# Patient Record
Sex: MALE | Race: BLACK OR AFRICAN AMERICAN | Employment: OTHER | URBAN - METROPOLITAN AREA
[De-identification: names, ages, dates, MRNs, and addresses within clinical notes are randomized per-mention and may not be internally consistent; named-entity substitution may affect disease eponyms.]

---

## 2017-02-17 ENCOUNTER — HOSPITAL ENCOUNTER (OUTPATIENT)
Dept: PREADMISSION TESTING | Age: 72
Discharge: HOME | End: 2017-02-17
Attending: ORTHOPAEDIC SURGERY | Admitting: ORTHOPAEDIC SURGERY

## 2017-02-17 VITALS
SYSTOLIC BLOOD PRESSURE: 135 MMHG | DIASTOLIC BLOOD PRESSURE: 76 MMHG | HEIGHT: 70 IN | WEIGHT: 272 LBS | BODY MASS INDEX: 38.94 KG/M2 | HEART RATE: 112 BPM | RESPIRATION RATE: 18 BRPM | OXYGEN SATURATION: 96 % | TEMPERATURE: 100.4 F

## 2017-02-17 LAB
ABO/RH: NORMAL
ANION GAP SERPL CALCULATED.3IONS-SCNC: 16 MMOL/L (ref 3–16)
ANISOCYTOSIS: ABNORMAL
ANTIBODY SCREEN: NORMAL
APTT: 31.7 SEC (ref 21–31.8)
BACTERIA: ABNORMAL /HPF
BASOPHILS ABSOLUTE: 0 K/UL (ref 0–0.2)
BASOPHILS RELATIVE PERCENT: 0 %
BILIRUBIN URINE: NEGATIVE
BLOOD, URINE: ABNORMAL
BUN BLDV-MCNC: 14 MG/DL (ref 7–20)
CALCIUM SERPL-MCNC: 9.2 MG/DL (ref 8.3–10.6)
CASTS: ABNORMAL /LPF
CHLORIDE BLD-SCNC: 98 MMOL/L (ref 99–110)
CLARITY: CLEAR
CO2: 19 MMOL/L (ref 21–32)
COLOR: YELLOW
CREAT SERPL-MCNC: 1.1 MG/DL (ref 0.8–1.3)
EKG ATRIAL RATE: 105 BPM
EKG DIAGNOSIS: NORMAL
EKG P AXIS: 39 DEGREES
EKG P-R INTERVAL: 146 MS
EKG Q-T INTERVAL: 334 MS
EKG QRS DURATION: 76 MS
EKG QTC CALCULATION (BAZETT): 441 MS
EKG R AXIS: -3 DEGREES
EKG T AXIS: 38 DEGREES
EKG VENTRICULAR RATE: 105 BPM
EOSINOPHILS ABSOLUTE: 0 K/UL (ref 0–0.6)
EOSINOPHILS RELATIVE PERCENT: 0 %
EPITHELIAL CELLS, UA: ABNORMAL /HPF
GFR AFRICAN AMERICAN: >60
GFR NON-AFRICAN AMERICAN: >60
GLUCOSE BLD-MCNC: 429 MG/DL (ref 70–99)
GLUCOSE URINE: >=1000 MG/DL
HCT VFR BLD CALC: 37.5 % (ref 40.5–52.5)
HEMOGLOBIN: 11.2 G/DL (ref 13.5–17.5)
INR BLD: 1.37 (ref 0.85–1.15)
KETONES, URINE: NEGATIVE MG/DL
LEUKOCYTE ESTERASE, URINE: NEGATIVE
LYMPHOCYTES ABSOLUTE: 29.2 K/UL (ref 1–5.1)
LYMPHOCYTES RELATIVE PERCENT: 84 %
MCH RBC QN AUTO: 20.8 PG (ref 26–34)
MCHC RBC AUTO-ENTMCNC: 29.8 G/DL (ref 31–36)
MCV RBC AUTO: 69.7 FL (ref 80–100)
MICROCYTES: ABNORMAL
MICROSCOPIC EXAMINATION: YES
MONOCYTES ABSOLUTE: 0.7 K/UL (ref 0–1.3)
MONOCYTES RELATIVE PERCENT: 2 %
MUCUS: ABNORMAL /LPF
NEUTROPHILS ABSOLUTE: 4.9 K/UL (ref 1.7–7.7)
NEUTROPHILS RELATIVE PERCENT: 14 %
NITRITE, URINE: NEGATIVE
OVALOCYTES: ABNORMAL
PDW BLD-RTO: 21.1 % (ref 12.4–15.4)
PH UA: 6
PLATELET # BLD: 149 K/UL (ref 135–450)
PMV BLD AUTO: 7.9 FL (ref 5–10.5)
POTASSIUM SERPL-SCNC: 4.4 MMOL/L (ref 3.5–5.1)
PROTEIN UA: 30 MG/DL
PROTHROMBIN TIME: 15.5 SEC (ref 9.6–13)
RBC # BLD: 5.39 M/UL (ref 4.2–5.9)
RBC UA: ABNORMAL /HPF (ref 0–2)
SCHISTOCYTES: ABNORMAL
SODIUM BLD-SCNC: 133 MMOL/L (ref 136–145)
SPECIFIC GRAVITY UA: 1.02
TEAR DROP CELLS: ABNORMAL
URINE TYPE: ABNORMAL
UROBILINOGEN, URINE: 0.2 E.U./DL
WBC # BLD: 34.8 K/UL (ref 4–11)
WBC UA: ABNORMAL /HPF (ref 0–5)

## 2017-02-17 PROCEDURE — 93010 ELECTROCARDIOGRAM REPORT: CPT | Performed by: INTERNAL MEDICINE

## 2017-02-17 RX ORDER — HYDROCODONE BITARTRATE AND ACETAMINOPHEN 5; 325 MG/1; MG/1
1 TABLET ORAL EVERY 6 HOURS PRN
Status: ON HOLD | COMMUNITY
End: 2017-08-03 | Stop reason: HOSPADM

## 2017-02-18 LAB
MRSA SCREEN RT-PCR: NORMAL
URINE CULTURE, ROUTINE: NORMAL

## 2017-03-10 ENCOUNTER — HOSPITAL ENCOUNTER (OUTPATIENT)
Dept: SURGERY | Age: 72
Discharge: HOME | End: 2017-03-29
Attending: ORTHOPAEDIC SURGERY | Admitting: ORTHOPAEDIC SURGERY

## 2017-03-10 DIAGNOSIS — M17.12 PRIMARY OSTEOARTHRITIS OF LEFT KNEE: ICD-10-CM

## 2017-07-19 ENCOUNTER — HOSPITAL ENCOUNTER (OUTPATIENT)
Dept: PREADMISSION TESTING | Age: 72
Discharge: HOME | End: 2017-07-19
Attending: ORTHOPAEDIC SURGERY | Admitting: ORTHOPAEDIC SURGERY

## 2017-07-19 VITALS
BODY MASS INDEX: 38.08 KG/M2 | SYSTOLIC BLOOD PRESSURE: 140 MMHG | HEART RATE: 91 BPM | RESPIRATION RATE: 14 BRPM | TEMPERATURE: 98.7 F | WEIGHT: 266 LBS | DIASTOLIC BLOOD PRESSURE: 84 MMHG | HEIGHT: 70 IN | OXYGEN SATURATION: 98 %

## 2017-07-19 LAB
ABO/RH: NORMAL
ANION GAP SERPL CALCULATED.3IONS-SCNC: 13 MMOL/L (ref 3–16)
ANISOCYTOSIS: ABNORMAL
ANTIBODY SCREEN: NORMAL
APTT: 36.2 SEC (ref 24.1–34.9)
ATYPICAL LYMPHOCYTE RELATIVE PERCENT: 63 % (ref 0–6)
BACTERIA: ABNORMAL /HPF
BASOPHILS ABSOLUTE: 0 K/UL (ref 0–0.2)
BASOPHILS RELATIVE PERCENT: 0 %
BILIRUBIN URINE: NEGATIVE
BLOOD, URINE: ABNORMAL
BUN BLDV-MCNC: 15 MG/DL (ref 7–20)
BURR CELLS: ABNORMAL
CALCIUM SERPL-MCNC: 9.2 MG/DL (ref 8.3–10.6)
CASTS: ABNORMAL /LPF
CHLORIDE BLD-SCNC: 103 MMOL/L (ref 99–110)
CLARITY: CLEAR
CO2: 24 MMOL/L (ref 21–32)
COLOR: YELLOW
CREAT SERPL-MCNC: 1.3 MG/DL (ref 0.8–1.3)
EOSINOPHILS ABSOLUTE: 0 K/UL (ref 0–0.6)
EOSINOPHILS RELATIVE PERCENT: 0 %
EPITHELIAL CELLS, UA: ABNORMAL /HPF
GFR AFRICAN AMERICAN: >60
GFR NON-AFRICAN AMERICAN: 54
GLUCOSE BLD-MCNC: 111 MG/DL (ref 70–99)
GLUCOSE URINE: NEGATIVE MG/DL
HCT VFR BLD CALC: 34.4 % (ref 40.5–52.5)
HEMATOLOGY PATH CONSULT: YES
HEMOGLOBIN: 10.8 G/DL (ref 13.5–17.5)
INR BLD: 1.35 (ref 0.85–1.15)
KETONES, URINE: NEGATIVE MG/DL
LEUKOCYTE ESTERASE, URINE: NEGATIVE
LYMPHOCYTES ABSOLUTE: 24.3 K/UL (ref 1–5.1)
LYMPHOCYTES RELATIVE PERCENT: 17 %
MCH RBC QN AUTO: 21.3 PG (ref 26–34)
MCHC RBC AUTO-ENTMCNC: 31.4 G/DL (ref 31–36)
MCV RBC AUTO: 67.8 FL (ref 80–100)
MICROCYTES: ABNORMAL
MICROSCOPIC EXAMINATION: YES
MONOCYTES ABSOLUTE: 0.9 K/UL (ref 0–1.3)
MONOCYTES RELATIVE PERCENT: 3 %
MUCUS: ABNORMAL /LPF
NEUTROPHILS ABSOLUTE: 5.2 K/UL (ref 1.7–7.7)
NEUTROPHILS RELATIVE PERCENT: 17 %
NITRITE, URINE: NEGATIVE
OVALOCYTES: ABNORMAL
PDW BLD-RTO: 22.3 % (ref 12.4–15.4)
PH UA: 5.5
PLATELET # BLD: 130 K/UL (ref 135–450)
PMV BLD AUTO: 7.8 FL (ref 5–10.5)
POLYCHROMASIA: ABNORMAL
POTASSIUM SERPL-SCNC: 4.2 MMOL/L (ref 3.5–5.1)
PROTEIN UA: 30 MG/DL
PROTHROMBIN TIME: 15.2 SEC (ref 9.6–13)
RBC # BLD: 5.08 M/UL (ref 4.2–5.9)
RBC UA: ABNORMAL /HPF (ref 0–2)
SCHISTOCYTES: ABNORMAL
SODIUM BLD-SCNC: 140 MMOL/L (ref 136–145)
SPECIFIC GRAVITY UA: >=1.03
TEAR DROP CELLS: ABNORMAL
URINE TYPE: ABNORMAL
UROBILINOGEN, URINE: 1 E.U./DL
WBC # BLD: 30.4 K/UL (ref 4–11)
WBC UA: ABNORMAL /HPF (ref 0–5)

## 2017-07-19 RX ORDER — CHLORHEXIDINE GLUCONATE 0.12 MG/ML
15 RINSE ORAL 2 TIMES DAILY
Status: CANCELLED | OUTPATIENT
Start: 2017-08-01

## 2017-07-19 RX ORDER — GLIPIZIDE 2.5 MG/1
2.5 TABLET, EXTENDED RELEASE ORAL DAILY
COMMUNITY
End: 2018-12-24 | Stop reason: ALTCHOICE

## 2017-07-19 ASSESSMENT — PAIN DESCRIPTION - ORIENTATION: ORIENTATION: RIGHT;LEFT

## 2017-07-19 ASSESSMENT — PAIN SCALES - GENERAL: PAINLEVEL_OUTOF10: 3

## 2017-07-19 ASSESSMENT — PAIN DESCRIPTION - LOCATION: LOCATION: KNEE

## 2017-07-19 ASSESSMENT — PAIN DESCRIPTION - FREQUENCY: FREQUENCY: CONTINUOUS

## 2017-07-19 ASSESSMENT — PAIN DESCRIPTION - DESCRIPTORS: DESCRIPTORS: ACHING;CONSTANT

## 2017-07-19 ASSESSMENT — PAIN DESCRIPTION - PAIN TYPE: TYPE: CHRONIC PAIN

## 2017-07-20 LAB
HEMATOLOGY PATH CONSULT: NORMAL
MRSA SCREEN RT-PCR: NORMAL
URINE CULTURE, ROUTINE: NORMAL

## 2017-08-03 PROBLEM — M17.12 PRIMARY OSTEOARTHRITIS OF LEFT KNEE: Status: ACTIVE | Noted: 2017-08-03

## 2018-12-24 ENCOUNTER — HOSPITAL ENCOUNTER (INPATIENT)
Age: 73
LOS: 2 days | Discharge: HOME | DRG: 247 | End: 2018-12-27
Attending: EMERGENCY MEDICINE | Admitting: INTERNAL MEDICINE
Payer: MEDICARE

## 2018-12-24 ENCOUNTER — APPOINTMENT (OUTPATIENT)
Dept: CT IMAGING | Age: 73
DRG: 247 | End: 2018-12-24
Payer: MEDICARE

## 2018-12-24 DIAGNOSIS — I21.3 ST ELEVATION MYOCARDIAL INFARCTION (STEMI), UNSPECIFIED ARTERY (HCC): Primary | ICD-10-CM

## 2018-12-24 PROCEDURE — 71275 CT ANGIOGRAPHY CHEST: CPT

## 2018-12-24 PROCEDURE — 74174 CTA ABD&PLVS W/CONTRAST: CPT

## 2018-12-24 PROCEDURE — 93005 ELECTROCARDIOGRAM TRACING: CPT | Performed by: EMERGENCY MEDICINE

## 2018-12-24 PROCEDURE — 6370000000 HC RX 637 (ALT 250 FOR IP): Performed by: EMERGENCY MEDICINE

## 2018-12-24 PROCEDURE — 99285 EMERGENCY DEPT VISIT HI MDM: CPT

## 2018-12-24 PROCEDURE — 6360000004 HC RX CONTRAST MEDICATION: Performed by: EMERGENCY MEDICINE

## 2018-12-24 RX ORDER — HEPARIN SODIUM 5000 [USP'U]/ML
5000 INJECTION, SOLUTION INTRAVENOUS; SUBCUTANEOUS ONCE
Status: DISCONTINUED | OUTPATIENT
Start: 2018-12-25 | End: 2018-12-24

## 2018-12-24 RX ORDER — HEPARIN SODIUM 5000 [USP'U]/ML
5000 INJECTION, SOLUTION INTRAVENOUS; SUBCUTANEOUS ONCE
Status: COMPLETED | OUTPATIENT
Start: 2018-12-25 | End: 2018-12-25

## 2018-12-24 RX ORDER — NITROGLYCERIN 0.4 MG/1
0.4 TABLET SUBLINGUAL EVERY 5 MIN PRN
Status: DISCONTINUED | OUTPATIENT
Start: 2018-12-24 | End: 2018-12-27 | Stop reason: HOSPADM

## 2018-12-24 RX ORDER — ASPIRIN 81 MG/1
324 TABLET, CHEWABLE ORAL ONCE
Status: COMPLETED | OUTPATIENT
Start: 2018-12-24 | End: 2018-12-24

## 2018-12-24 RX ORDER — NITROGLYCERIN 0.4 MG/1
TABLET SUBLINGUAL
Status: DISPENSED
Start: 2018-12-24 | End: 2018-12-25

## 2018-12-24 RX ADMIN — IOVERSOL 85 ML: 678 INJECTION INTRA-ARTERIAL; INTRAVENOUS at 23:12

## 2018-12-24 RX ADMIN — NITROGLYCERIN 0.4 MG: 0.4 TABLET, ORALLY DISINTEGRATING SUBLINGUAL at 23:49

## 2018-12-24 RX ADMIN — ASPIRIN 81 MG 324 MG: 81 TABLET ORAL at 23:37

## 2018-12-24 RX ADMIN — NITROGLYCERIN 0.5 INCH: 20 OINTMENT TOPICAL at 23:38

## 2018-12-24 ASSESSMENT — PAIN DESCRIPTION - LOCATION: LOCATION: ABDOMEN

## 2018-12-24 ASSESSMENT — PAIN DESCRIPTION - PAIN TYPE: TYPE: ACUTE PAIN

## 2018-12-24 ASSESSMENT — PAIN SCALES - GENERAL: PAINLEVEL_OUTOF10: 7

## 2018-12-25 PROBLEM — I21.3 STEMI (ST ELEVATION MYOCARDIAL INFARCTION) (HCC): Status: ACTIVE | Noted: 2018-12-25

## 2018-12-25 LAB
A/G RATIO: 1.1 (ref 1.1–2.2)
ALBUMIN SERPL-MCNC: 3.9 G/DL (ref 3.4–5)
ALBUMIN SERPL-MCNC: 3.9 G/DL (ref 3.4–5)
ALP BLD-CCNC: 58 U/L (ref 40–129)
ALT SERPL-CCNC: 8 U/L (ref 10–40)
ANION GAP SERPL CALCULATED.3IONS-SCNC: 12 MMOL/L (ref 3–16)
ANION GAP SERPL CALCULATED.3IONS-SCNC: 13 MMOL/L (ref 3–16)
ANISOCYTOSIS: ABNORMAL
AST SERPL-CCNC: 14 U/L (ref 15–37)
BASOPHILS ABSOLUTE: 0 K/UL (ref 0–0.2)
BASOPHILS RELATIVE PERCENT: 0 %
BILIRUB SERPL-MCNC: 0.5 MG/DL (ref 0–1)
BUN BLDV-MCNC: 20 MG/DL (ref 7–20)
BUN BLDV-MCNC: 21 MG/DL (ref 7–20)
BURR CELLS: ABNORMAL
CALCIUM SERPL-MCNC: 9 MG/DL (ref 8.3–10.6)
CALCIUM SERPL-MCNC: 9 MG/DL (ref 8.3–10.6)
CHLORIDE BLD-SCNC: 103 MMOL/L (ref 99–110)
CHLORIDE BLD-SCNC: 99 MMOL/L (ref 99–110)
CO2: 21 MMOL/L (ref 21–32)
CO2: 24 MMOL/L (ref 21–32)
CREAT SERPL-MCNC: 1.3 MG/DL (ref 0.8–1.3)
CREAT SERPL-MCNC: 1.4 MG/DL (ref 0.8–1.3)
EKG ATRIAL RATE: 81 BPM
EKG DIAGNOSIS: NORMAL
EKG P AXIS: 52 DEGREES
EKG P-R INTERVAL: 188 MS
EKG Q-T INTERVAL: 402 MS
EKG QRS DURATION: 84 MS
EKG QTC CALCULATION (BAZETT): 466 MS
EKG R AXIS: 40 DEGREES
EKG T AXIS: 65 DEGREES
EKG VENTRICULAR RATE: 81 BPM
EOSINOPHILS ABSOLUTE: 0 K/UL (ref 0–0.6)
EOSINOPHILS RELATIVE PERCENT: 0 %
GFR AFRICAN AMERICAN: >60
GFR AFRICAN AMERICAN: >60
GFR NON-AFRICAN AMERICAN: 50
GFR NON-AFRICAN AMERICAN: 54
GLOBULIN: 3.7 G/DL
GLUCOSE BLD-MCNC: 185 MG/DL (ref 70–99)
GLUCOSE BLD-MCNC: 241 MG/DL (ref 70–99)
HAIRY CELLS: PRESENT
HCT VFR BLD CALC: 33.9 % (ref 40.5–52.5)
HEMATOLOGY PATH CONSULT: NO
HEMOGLOBIN: 10.2 G/DL (ref 13.5–17.5)
INR BLD: 1.4 (ref 0.86–1.14)
LACTIC ACID: 1.3 MMOL/L (ref 0.4–2)
LEFT VENTRICULAR EJECTION FRACTION HIGH VALUE: 30 %
LEFT VENTRICULAR EJECTION FRACTION MODE: NORMAL
LIPASE: 37 U/L (ref 13–60)
LYMPHOCYTES ABSOLUTE: 4.8 K/UL (ref 1–5.1)
LYMPHOCYTES RELATIVE PERCENT: 52 %
MCH RBC QN AUTO: 20.5 PG (ref 26–34)
MCHC RBC AUTO-ENTMCNC: 30.1 G/DL (ref 31–36)
MCV RBC AUTO: 67.9 FL (ref 80–100)
MICROCYTES: ABNORMAL
MONOCYTES ABSOLUTE: 0.1 K/UL (ref 0–1.3)
MONOCYTES RELATIVE PERCENT: 1 %
NEUTROPHILS ABSOLUTE: 4.3 K/UL (ref 1.7–7.7)
NEUTROPHILS RELATIVE PERCENT: 47 %
OVALOCYTES: ABNORMAL
PDW BLD-RTO: 22.9 % (ref 12.4–15.4)
PHOSPHORUS: 3.6 MG/DL (ref 2.5–4.9)
PLATELET # BLD: 141 K/UL (ref 135–450)
PLATELET SLIDE REVIEW: ADEQUATE
PMV BLD AUTO: 8 FL (ref 5–10.5)
POC ACT LR: 167 SEC
POC ACT LR: 325 SEC
POIKILOCYTES: ABNORMAL
POLYCHROMASIA: ABNORMAL
POTASSIUM SERPL-SCNC: 4 MMOL/L (ref 3.5–5.1)
POTASSIUM SERPL-SCNC: 4.3 MMOL/L (ref 3.5–5.1)
PRO-BNP: 5655 PG/ML (ref 0–124)
PROTHROMBIN TIME: 16 SEC (ref 9.8–13)
RBC # BLD: 5 M/UL (ref 4.2–5.9)
REPORT: NORMAL
SCHISTOCYTES: ABNORMAL
SODIUM BLD-SCNC: 136 MMOL/L (ref 136–145)
SODIUM BLD-SCNC: 136 MMOL/L (ref 136–145)
TARGET CELLS: ABNORMAL
TEAR DROP CELLS: ABNORMAL
TOTAL PROTEIN: 7.6 G/DL (ref 6.4–8.2)
TROPONIN: 0.03 NG/ML
WBC # BLD: 9.2 K/UL (ref 4–11)

## 2018-12-25 PROCEDURE — 83880 ASSAY OF NATRIURETIC PEPTIDE: CPT

## 2018-12-25 PROCEDURE — B41G1ZZ FLUOROSCOPY OF LEFT LOWER EXTREMITY ARTERIES USING LOW OSMOLAR CONTRAST: ICD-10-PCS | Performed by: INTERNAL MEDICINE

## 2018-12-25 PROCEDURE — 99223 1ST HOSP IP/OBS HIGH 75: CPT | Performed by: INTERNAL MEDICINE

## 2018-12-25 PROCEDURE — 6370000000 HC RX 637 (ALT 250 FOR IP): Performed by: INTERNAL MEDICINE

## 2018-12-25 PROCEDURE — C1874 STENT, COATED/COV W/DEL SYS: HCPCS

## 2018-12-25 PROCEDURE — 92941 PRQ TRLML REVSC TOT OCCL AMI: CPT | Performed by: INTERNAL MEDICINE

## 2018-12-25 PROCEDURE — 83605 ASSAY OF LACTIC ACID: CPT

## 2018-12-25 PROCEDURE — 6360000002 HC RX W HCPCS: Performed by: INTERNAL MEDICINE

## 2018-12-25 PROCEDURE — 2500000003 HC RX 250 WO HCPCS

## 2018-12-25 PROCEDURE — 85025 COMPLETE CBC W/AUTO DIFF WBC: CPT

## 2018-12-25 PROCEDURE — 6360000002 HC RX W HCPCS

## 2018-12-25 PROCEDURE — 87186 SC STD MICRODIL/AGAR DIL: CPT

## 2018-12-25 PROCEDURE — 99153 MOD SED SAME PHYS/QHP EA: CPT | Performed by: INTERNAL MEDICINE

## 2018-12-25 PROCEDURE — C1887 CATHETER, GUIDING: HCPCS

## 2018-12-25 PROCEDURE — 027034Z DILATION OF CORONARY ARTERY, ONE ARTERY WITH DRUG-ELUTING INTRALUMINAL DEVICE, PERCUTANEOUS APPROACH: ICD-10-PCS | Performed by: INTERNAL MEDICINE

## 2018-12-25 PROCEDURE — C1725 CATH, TRANSLUMIN NON-LASER: HCPCS

## 2018-12-25 PROCEDURE — 6370000000 HC RX 637 (ALT 250 FOR IP): Performed by: EMERGENCY MEDICINE

## 2018-12-25 PROCEDURE — 80053 COMPREHEN METABOLIC PANEL: CPT

## 2018-12-25 PROCEDURE — 2580000003 HC RX 258: Performed by: INTERNAL MEDICINE

## 2018-12-25 PROCEDURE — 99152 MOD SED SAME PHYS/QHP 5/>YRS: CPT | Performed by: INTERNAL MEDICINE

## 2018-12-25 PROCEDURE — 84484 ASSAY OF TROPONIN QUANT: CPT

## 2018-12-25 PROCEDURE — B2151ZZ FLUOROSCOPY OF LEFT HEART USING LOW OSMOLAR CONTRAST: ICD-10-PCS | Performed by: INTERNAL MEDICINE

## 2018-12-25 PROCEDURE — 92928 PRQ TCAT PLMT NTRAC ST 1 LES: CPT | Performed by: INTERNAL MEDICINE

## 2018-12-25 PROCEDURE — 87040 BLOOD CULTURE FOR BACTERIA: CPT

## 2018-12-25 PROCEDURE — C1760 CLOSURE DEV, VASC: HCPCS

## 2018-12-25 PROCEDURE — B2111ZZ FLUOROSCOPY OF MULTIPLE CORONARY ARTERIES USING LOW OSMOLAR CONTRAST: ICD-10-PCS | Performed by: INTERNAL MEDICINE

## 2018-12-25 PROCEDURE — 6370000000 HC RX 637 (ALT 250 FOR IP)

## 2018-12-25 PROCEDURE — 94762 N-INVAS EAR/PLS OXIMTRY CONT: CPT

## 2018-12-25 PROCEDURE — 83690 ASSAY OF LIPASE: CPT

## 2018-12-25 PROCEDURE — 2709999900 HC NON-CHARGEABLE SUPPLY

## 2018-12-25 PROCEDURE — 85610 PROTHROMBIN TIME: CPT

## 2018-12-25 PROCEDURE — 36415 COLL VENOUS BLD VENIPUNCTURE: CPT

## 2018-12-25 PROCEDURE — 6360000004 HC RX CONTRAST MEDICATION: Performed by: INTERNAL MEDICINE

## 2018-12-25 PROCEDURE — 93458 L HRT ARTERY/VENTRICLE ANGIO: CPT | Performed by: INTERNAL MEDICINE

## 2018-12-25 PROCEDURE — 93005 ELECTROCARDIOGRAM TRACING: CPT | Performed by: INTERNAL MEDICINE

## 2018-12-25 PROCEDURE — 2100000000 HC CCU R&B

## 2018-12-25 PROCEDURE — C1769 GUIDE WIRE: HCPCS

## 2018-12-25 PROCEDURE — 87801 DETECT AGNT MULT DNA AMPLI: CPT

## 2018-12-25 PROCEDURE — 4A023N7 MEASUREMENT OF CARDIAC SAMPLING AND PRESSURE, LEFT HEART, PERCUTANEOUS APPROACH: ICD-10-PCS | Performed by: INTERNAL MEDICINE

## 2018-12-25 PROCEDURE — 85347 COAGULATION TIME ACTIVATED: CPT

## 2018-12-25 PROCEDURE — 96374 THER/PROPH/DIAG INJ IV PUSH: CPT

## 2018-12-25 PROCEDURE — C1894 INTRO/SHEATH, NON-LASER: HCPCS

## 2018-12-25 PROCEDURE — 6360000002 HC RX W HCPCS: Performed by: EMERGENCY MEDICINE

## 2018-12-25 PROCEDURE — 2700000000 HC OXYGEN THERAPY PER DAY

## 2018-12-25 RX ORDER — ATROPINE SULFATE 0.4 MG/ML
0.5 AMPUL (ML) INJECTION
Status: DISPENSED | OUTPATIENT
Start: 2018-12-25 | End: 2018-12-25

## 2018-12-25 RX ORDER — MORPHINE SULFATE 2 MG/ML
2 INJECTION, SOLUTION INTRAMUSCULAR; INTRAVENOUS
Status: ACTIVE | OUTPATIENT
Start: 2018-12-25 | End: 2018-12-25

## 2018-12-25 RX ORDER — FUROSEMIDE 10 MG/ML
40 INJECTION INTRAMUSCULAR; INTRAVENOUS 2 TIMES DAILY
Status: DISCONTINUED | OUTPATIENT
Start: 2018-12-25 | End: 2018-12-27

## 2018-12-25 RX ORDER — ACETAMINOPHEN 325 MG/1
650 TABLET ORAL EVERY 4 HOURS PRN
Status: DISCONTINUED | OUTPATIENT
Start: 2018-12-25 | End: 2018-12-27 | Stop reason: HOSPADM

## 2018-12-25 RX ORDER — 0.9 % SODIUM CHLORIDE 0.9 %
500 INTRAVENOUS SOLUTION INTRAVENOUS PRN
Status: DISCONTINUED | OUTPATIENT
Start: 2018-12-25 | End: 2018-12-27 | Stop reason: HOSPADM

## 2018-12-25 RX ORDER — LISINOPRIL 20 MG/1
20 TABLET ORAL DAILY
Status: DISCONTINUED | OUTPATIENT
Start: 2018-12-25 | End: 2018-12-27 | Stop reason: HOSPADM

## 2018-12-25 RX ORDER — SODIUM CHLORIDE 0.9 % (FLUSH) 0.9 %
10 SYRINGE (ML) INJECTION PRN
Status: DISCONTINUED | OUTPATIENT
Start: 2018-12-25 | End: 2018-12-27 | Stop reason: HOSPADM

## 2018-12-25 RX ORDER — METOPROLOL SUCCINATE 50 MG/1
50 TABLET, EXTENDED RELEASE ORAL DAILY
Status: DISCONTINUED | OUTPATIENT
Start: 2018-12-25 | End: 2018-12-27 | Stop reason: HOSPADM

## 2018-12-25 RX ORDER — ONDANSETRON 2 MG/ML
4 INJECTION INTRAMUSCULAR; INTRAVENOUS EVERY 6 HOURS PRN
Status: DISCONTINUED | OUTPATIENT
Start: 2018-12-25 | End: 2018-12-27 | Stop reason: HOSPADM

## 2018-12-25 RX ORDER — MIDAZOLAM HYDROCHLORIDE 1 MG/ML
2 INJECTION INTRAMUSCULAR; INTRAVENOUS
Status: ACTIVE | OUTPATIENT
Start: 2018-12-25 | End: 2018-12-25

## 2018-12-25 RX ORDER — ATORVASTATIN CALCIUM 40 MG/1
40 TABLET, FILM COATED ORAL NIGHTLY
Status: DISCONTINUED | OUTPATIENT
Start: 2018-12-25 | End: 2018-12-27 | Stop reason: HOSPADM

## 2018-12-25 RX ORDER — FERROUS SULFATE TAB EC 324 MG (65 MG FE EQUIVALENT) 324 (65 FE) MG
324 TABLET DELAYED RESPONSE ORAL
Status: DISCONTINUED | OUTPATIENT
Start: 2018-12-25 | End: 2018-12-27 | Stop reason: HOSPADM

## 2018-12-25 RX ORDER — ASPIRIN 81 MG/1
81 TABLET, CHEWABLE ORAL DAILY
Status: DISCONTINUED | OUTPATIENT
Start: 2018-12-26 | End: 2018-12-27 | Stop reason: HOSPADM

## 2018-12-25 RX ORDER — SODIUM CHLORIDE 0.9 % (FLUSH) 0.9 %
10 SYRINGE (ML) INJECTION EVERY 12 HOURS SCHEDULED
Status: DISCONTINUED | OUTPATIENT
Start: 2018-12-25 | End: 2018-12-27

## 2018-12-25 RX ORDER — SPIRONOLACTONE 25 MG/1
25 TABLET ORAL DAILY
Status: DISCONTINUED | OUTPATIENT
Start: 2018-12-25 | End: 2018-12-27 | Stop reason: HOSPADM

## 2018-12-25 RX ORDER — SODIUM CHLORIDE 9 MG/ML
INJECTION, SOLUTION INTRAVENOUS CONTINUOUS
Status: DISCONTINUED | OUTPATIENT
Start: 2018-12-25 | End: 2018-12-26

## 2018-12-25 RX ORDER — FENTANYL CITRATE 50 UG/ML
25 INJECTION, SOLUTION INTRAMUSCULAR; INTRAVENOUS
Status: ACTIVE | OUTPATIENT
Start: 2018-12-25 | End: 2018-12-25

## 2018-12-25 RX ADMIN — FUROSEMIDE 40 MG: 10 INJECTION, SOLUTION INTRAMUSCULAR; INTRAVENOUS at 03:07

## 2018-12-25 RX ADMIN — CEFTRIAXONE SODIUM 2 G: 2 INJECTION, POWDER, FOR SOLUTION INTRAMUSCULAR; INTRAVENOUS at 21:33

## 2018-12-25 RX ADMIN — TICAGRELOR 90 MG: 90 TABLET ORAL at 21:33

## 2018-12-25 RX ADMIN — FERROUS SULFATE TAB EC 324 MG (65 MG FE EQUIVALENT) 324 MG: 324 (65 FE) TABLET DELAYED RESPONSE at 10:15

## 2018-12-25 RX ADMIN — METOPROLOL SUCCINATE 50 MG: 50 TABLET, EXTENDED RELEASE ORAL at 03:06

## 2018-12-25 RX ADMIN — Medication 10 ML: at 10:16

## 2018-12-25 RX ADMIN — IOPAMIDOL 245 ML: 755 INJECTION, SOLUTION INTRAVENOUS at 01:22

## 2018-12-25 RX ADMIN — TICAGRELOR 90 MG: 90 TABLET ORAL at 10:15

## 2018-12-25 RX ADMIN — NITROGLYCERIN 0.4 MG: 0.4 TABLET, ORALLY DISINTEGRATING SUBLINGUAL at 00:01

## 2018-12-25 RX ADMIN — ATORVASTATIN CALCIUM 40 MG: 40 TABLET, FILM COATED ORAL at 21:33

## 2018-12-25 RX ADMIN — LISINOPRIL 20 MG: 20 TABLET ORAL at 03:06

## 2018-12-25 RX ADMIN — HEPARIN SODIUM 5000 UNITS: 5000 INJECTION INTRAVENOUS; SUBCUTANEOUS at 00:00

## 2018-12-25 RX ADMIN — FUROSEMIDE 40 MG: 10 INJECTION, SOLUTION INTRAMUSCULAR; INTRAVENOUS at 10:15

## 2018-12-25 RX ADMIN — SPIRONOLACTONE 25 MG: 25 TABLET ORAL at 10:15

## 2018-12-25 RX ADMIN — TIROFIBAN 0.15 MCG/KG/MIN: 5 INJECTION, SOLUTION INTRAVENOUS at 02:15

## 2018-12-25 ASSESSMENT — PAIN SCALES - GENERAL: PAINLEVEL_OUTOF10: 0

## 2018-12-25 NOTE — H&P
[address]  Admission H & P    CC: Chest pain      HPI:   This is a 68 y.o. male with history of diabetes, hepatitis C and hairy cell leukemia who came in with 3-4 hours of chest pain. The patient described a severe pain in the epigastric abdominal region associated with nausea, vomiting. He drove himself to the hospital.  Today, and a squat appetite. Initially O2 sats were 70% on room air. The ER physician was also concerned about dissection. The patient was therefore sent for CAT scan of the chest which ruled out PE and dissection but found that the patient had interstitial changes on her echo. The x-rays. Because of ongoing chest pain. We therefore decided to bring the patient to the lab. Eyes EKG was showing a high lateral injury. Past Medical History:   Diagnosis Date    Anemia     Arthritis     Cancer ([facility] [address] 2005    hairy cell leukemia    Colon polyps 08/2010    Diabetes mellitus ([facility]     Hepatitis C     Obesity     Splenomegaly 2010        Past Surgical History:   Procedure Laterality Date    BONE MARROW BIOPSY      COLONOSCOPY      LIVER BIOPSY      TOTAL KNEE ARTHROPLASTY Left 08/02/2017         History reviewed. No pertinent family history.      Social History   Substance Use Topics    Smoking status: Former Smoker     Quit date: 2/17/1981    Smokeless tobacco: Never Used    Alcohol use No        Allergies   Allergen Reactions    Oxycodone Rash     Pt has no problem with hydrocodone         ferrous sulfate  325 mg Oral Daily with breakfast    furosemide  40 mg Intravenous BID    nitroGLYCERIN            Review of Systems -   Constitutional: Negative for weight gain/loss; malaise, fever  Respiratory: Negative for Asthma;  cough and hemoptysis  Cardiovascular: Negative for palpitations,dizziness   Gastrointestinal: Negative for abd.pain; constipation/diarrhea;    Genitourinary: Negative for stones; hematuria; frequency hesitancy  Integumentt: Negative for rash or pruritis  Hematologic/lymphatic: Negative for blood dyscrasia; leukemia/lymphoma  Musculoskeletal: Negative for Connective tissue disease  Neurological:  Negative for Seizure   Behavioral/Psych:Negative for Bipolar disorder, Schizophrenia; Dementia  Endocrine: negative for thyroid, parathyroid disease    No intake or output data in the 24 hours ending 12/25/18 0134     Physical Examination:    BP (!) 154/105   Pulse 106   Temp 97.7 °F (36.5 °C) (Oral)   Resp (!) 39   Wt 263 lb 7.2 oz (119.5 kg)   SpO2 94%   BMI 38.90 kg/m²    Vitals:    12/24/18 2330 12/24/18 2331 12/24/18 2352 12/25/18 0000   BP:  (!) 160/103 (!) 150/105 (!) 154/105   Pulse: 106 106     Resp:  (!) 39     Temp:       TempSrc:       SpO2:  94%     Weight:         Wt Readings from Last 3 Encounters:   12/24/18 263 lb 7.2 oz (119.5 kg)   08/02/17 267 lb (121.1 kg)   07/19/17 266 lb (120.7 kg)      BP Readings from Last 3 Encounters:   12/25/18 (!) 154/105   08/04/17 116/74   07/19/17 (!) 140/84         HEENT:  Face: Atraumatic, Conjunctiva: Pink; non icteric,  Mucous Memb:  Moist, No thyromegaly or Lymphadenopathy  Respiratory:  Resp Assessment: normal, Resp Auscultation: clear   Cardiovascular: Auscultation: nl S1 & S2, Palpation:  Nl PMI;  No heaves or thrills, JVP:  normal  Abdomen: Soft, non-tender, Normal bowel sounds,  No organomegaly  Extremities: No Cyanosis or Clubbing  Neurological: Oriented to time, place, and person, Non-anxious  Psychiatric: Normal mood and affect  Skin: Warm and dry,  No rash seen    Current Facility-Administered Medications: ferrous sulfate tablet 325 mg, 325 mg, Oral, Daily with breakfast  furosemide (LASIX) injection 40 mg, 40 mg, Intravenous, BID  nitroGLYCERIN (NITROSTAT) SL tablet 0.4 mg, 0.4 mg, Sublingual, Q5 Min PRN  nitroGLYCERIN (NITROSTAT) 0.4 MG SL tablet, , ,        Labs:   Recent Labs      12/25/18   0004   WBC  9.2   HGB  10.2*   HCT  33.9*   PLT  141     Recent Labs      12/25/18   0004   NA  136

## 2018-12-25 NOTE — ED PROVIDER NOTES
Triage Chief Complaint:   Shortness of Breath (X2 hours, 78% on RA, diaphoretic complains of abdominal pain also.)      [place]:  Rey Noonan is a 68 y.o. male that presents to the emergency room with shortness of breath, nausea, vomiting, chest pain and abdominal pain. This started just 2 hours prior to presenting to the emergency room. EMS reported that the patient was 70% on room air and diaphoretic. He's never had symptoms like this before. He has a history of diabetes. He has no cardiac history. He does complain of some chest pain. He denies that this pain goes into his arms or legs or neck. .    Past Medical History:   Diagnosis Date    Anemia     Arthritis     Cancer ([facility] [address] 2005    hairy cell leukemia    Colon polyps 08/2010    [facility]     Hepatitis C     Obesity     Splenomegaly 2010     Past Surgical History:   Procedure Laterality Date    BONE MARROW BIOPSY      COLONOSCOPY      LIVER BIOPSY      TOTAL KNEE ARTHROPLASTY Left 08/02/2017     History reviewed. No pertinent family history. Social History     Social History    Marital status: Single     Spouse name: N/A    Number of children: N/A    Years of education: N/A     Occupational History    Not on file.      Social History Main Topics    Smoking status: Former Smoker     Quit date: 2/17/1981    Smokeless tobacco: Never Used    Alcohol use No    Drug use: No    Sexual activity: Not on file     Other Topics Concern    Not on file     Social History Narrative    No narrative on file     Current Facility-Administered Medications   Medication Dose Route Frequency Provider Last Rate Last Dose    nitroGLYCERIN (NITROSTAT) SL tablet 0.4 mg  0.4 mg Sublingual Q5 Min PRN William Johnson MD   0.4 mg at 12/24/18 4470    nitroGLYCERIN (NITROSTAT) 0.4 MG SL tablet             [START ON 12/25/2018] heparin (porcine) injection 5,000 Units  5,000 Units Intravenous Once William Johnson MD         Current Outpatient

## 2018-12-26 LAB
ACANTHOCYTES: ABNORMAL
ALBUMIN SERPL-MCNC: 3.5 G/DL (ref 3.4–5)
ANION GAP SERPL CALCULATED.3IONS-SCNC: 13 MMOL/L (ref 3–16)
ANISOCYTOSIS: ABNORMAL
BASOPHILS ABSOLUTE: 0 K/UL (ref 0–0.2)
BASOPHILS RELATIVE PERCENT: 0.5 %
BUN BLDV-MCNC: 24 MG/DL (ref 7–20)
CALCIUM SERPL-MCNC: 8.9 MG/DL (ref 8.3–10.6)
CHLORIDE BLD-SCNC: 102 MMOL/L (ref 99–110)
CO2: 25 MMOL/L (ref 21–32)
CREAT SERPL-MCNC: 1.3 MG/DL (ref 0.8–1.3)
EKG ATRIAL RATE: 105 BPM
EKG ATRIAL RATE: 107 BPM
EKG ATRIAL RATE: 115 BPM
EKG DIAGNOSIS: NORMAL
EKG P AXIS: 36 DEGREES
EKG P AXIS: 40 DEGREES
EKG P AXIS: 45 DEGREES
EKG P-R INTERVAL: 148 MS
EKG P-R INTERVAL: 164 MS
EKG P-R INTERVAL: 172 MS
EKG Q-T INTERVAL: 322 MS
EKG Q-T INTERVAL: 352 MS
EKG Q-T INTERVAL: 354 MS
EKG QRS DURATION: 78 MS
EKG QRS DURATION: 88 MS
EKG QRS DURATION: 90 MS
EKG QTC CALCULATION (BAZETT): 445 MS
EKG QTC CALCULATION (BAZETT): 467 MS
EKG QTC CALCULATION (BAZETT): 469 MS
EKG R AXIS: -23 DEGREES
EKG R AXIS: -24 DEGREES
EKG R AXIS: -28 DEGREES
EKG T AXIS: -12 DEGREES
EKG T AXIS: -6 DEGREES
EKG T AXIS: 11 DEGREES
EKG VENTRICULAR RATE: 105 BPM
EKG VENTRICULAR RATE: 107 BPM
EKG VENTRICULAR RATE: 115 BPM
EOSINOPHILS ABSOLUTE: 0 K/UL (ref 0–0.6)
EOSINOPHILS RELATIVE PERCENT: 0.2 %
FERRITIN: 123.3 NG/ML (ref 30–400)
GFR AFRICAN AMERICAN: >60
GFR NON-AFRICAN AMERICAN: 54
GLUCOSE BLD-MCNC: 132 MG/DL (ref 70–99)
HCT VFR BLD CALC: 30.8 % (ref 40.5–52.5)
HEMATOLOGY PATH CONSULT: NORMAL
HEMATOLOGY PATH CONSULT: YES
HEMOGLOBIN: 9.4 G/DL (ref 13.5–17.5)
IRON SATURATION: 11 % (ref 20–50)
IRON: 26 UG/DL (ref 59–158)
LV EF: 35 %
LVEF MODALITY: NORMAL
LYMPHOCYTES ABSOLUTE: 1 K/UL (ref 1–5.1)
LYMPHOCYTES RELATIVE PERCENT: 13.2 %
MCH RBC QN AUTO: 20.2 PG (ref 26–34)
MCHC RBC AUTO-ENTMCNC: 30.5 G/DL (ref 31–36)
MCV RBC AUTO: 66.2 FL (ref 80–100)
MICROCYTES: ABNORMAL
MONOCYTES ABSOLUTE: 4.2 K/UL (ref 0–1.3)
MONOCYTES RELATIVE PERCENT: 52.8 %
NEUTROPHILS ABSOLUTE: 2.6 K/UL (ref 1.7–7.7)
NEUTROPHILS RELATIVE PERCENT: 33.3 %
OVALOCYTES: ABNORMAL
PDW BLD-RTO: 22.2 % (ref 12.4–15.4)
PHOSPHORUS: 3.5 MG/DL (ref 2.5–4.9)
PLATELET # BLD: 125 K/UL (ref 135–450)
PMV BLD AUTO: 8 FL (ref 5–10.5)
POC ACT LR: 146 SEC
POC ACT LR: 185 SEC
POC ACT LR: 188 SEC
POIKILOCYTES: ABNORMAL
POLYCHROMASIA: ABNORMAL
POTASSIUM SERPL-SCNC: 4.1 MMOL/L (ref 3.5–5.1)
RBC # BLD: 4.65 M/UL (ref 4.2–5.9)
SCHISTOCYTES: ABNORMAL
SODIUM BLD-SCNC: 140 MMOL/L (ref 136–145)
TEAR DROP CELLS: ABNORMAL
TOTAL IRON BINDING CAPACITY: 242 UG/DL (ref 260–445)
WBC # BLD: 7.9 K/UL (ref 4–11)

## 2018-12-26 PROCEDURE — 87040 BLOOD CULTURE FOR BACTERIA: CPT

## 2018-12-26 PROCEDURE — 94762 N-INVAS EAR/PLS OXIMTRY CONT: CPT

## 2018-12-26 PROCEDURE — 82728 ASSAY OF FERRITIN: CPT

## 2018-12-26 PROCEDURE — C9113 INJ PANTOPRAZOLE SODIUM, VIA: HCPCS | Performed by: INTERNAL MEDICINE

## 2018-12-26 PROCEDURE — 87338 HPYLORI STOOL AG IA: CPT

## 2018-12-26 PROCEDURE — 2580000003 HC RX 258: Performed by: INTERNAL MEDICINE

## 2018-12-26 PROCEDURE — 6370000000 HC RX 637 (ALT 250 FOR IP): Performed by: INTERNAL MEDICINE

## 2018-12-26 PROCEDURE — 6360000002 HC RX W HCPCS: Performed by: INTERNAL MEDICINE

## 2018-12-26 PROCEDURE — 83550 IRON BINDING TEST: CPT

## 2018-12-26 PROCEDURE — 85025 COMPLETE CBC W/AUTO DIFF WBC: CPT

## 2018-12-26 PROCEDURE — 36415 COLL VENOUS BLD VENIPUNCTURE: CPT

## 2018-12-26 PROCEDURE — 80069 RENAL FUNCTION PANEL: CPT

## 2018-12-26 PROCEDURE — 2700000000 HC OXYGEN THERAPY PER DAY

## 2018-12-26 PROCEDURE — 99233 SBSQ HOSP IP/OBS HIGH 50: CPT | Performed by: INTERNAL MEDICINE

## 2018-12-26 PROCEDURE — 2100000000 HC CCU R&B

## 2018-12-26 PROCEDURE — 93010 ELECTROCARDIOGRAM REPORT: CPT | Performed by: INTERNAL MEDICINE

## 2018-12-26 PROCEDURE — 83540 ASSAY OF IRON: CPT

## 2018-12-26 RX ORDER — PANTOPRAZOLE SODIUM 40 MG/10ML
40 INJECTION, POWDER, LYOPHILIZED, FOR SOLUTION INTRAVENOUS 2 TIMES DAILY
Status: DISCONTINUED | OUTPATIENT
Start: 2018-12-26 | End: 2018-12-27

## 2018-12-26 RX ORDER — 0.9 % SODIUM CHLORIDE 0.9 %
10 VIAL (ML) INJECTION DAILY
Status: DISCONTINUED | OUTPATIENT
Start: 2018-12-26 | End: 2018-12-27

## 2018-12-26 RX ADMIN — TICAGRELOR 90 MG: 90 TABLET ORAL at 20:33

## 2018-12-26 RX ADMIN — FUROSEMIDE 40 MG: 10 INJECTION, SOLUTION INTRAMUSCULAR; INTRAVENOUS at 08:14

## 2018-12-26 RX ADMIN — Medication 10 ML: at 08:14

## 2018-12-26 RX ADMIN — TICAGRELOR 90 MG: 90 TABLET ORAL at 08:13

## 2018-12-26 RX ADMIN — Medication 10 ML: at 20:33

## 2018-12-26 RX ADMIN — Medication 10 ML: at 14:16

## 2018-12-26 RX ADMIN — FERROUS SULFATE TAB EC 324 MG (65 MG FE EQUIVALENT) 324 MG: 324 (65 FE) TABLET DELAYED RESPONSE at 08:14

## 2018-12-26 RX ADMIN — METOPROLOL SUCCINATE 50 MG: 50 TABLET, EXTENDED RELEASE ORAL at 08:13

## 2018-12-26 RX ADMIN — PANTOPRAZOLE SODIUM 40 MG: 40 INJECTION, POWDER, FOR SOLUTION INTRAVENOUS at 20:33

## 2018-12-26 RX ADMIN — LISINOPRIL 20 MG: 20 TABLET ORAL at 08:13

## 2018-12-26 RX ADMIN — DARBEPOETIN ALFA 150 MCG: 150 INJECTION, SOLUTION INTRAVENOUS; SUBCUTANEOUS at 14:16

## 2018-12-26 RX ADMIN — IRON SUCROSE 200 MG: 20 INJECTION, SOLUTION INTRAVENOUS at 13:17

## 2018-12-26 RX ADMIN — FUROSEMIDE 40 MG: 10 INJECTION, SOLUTION INTRAMUSCULAR; INTRAVENOUS at 19:20

## 2018-12-26 RX ADMIN — SPIRONOLACTONE 25 MG: 25 TABLET ORAL at 08:13

## 2018-12-26 RX ADMIN — ATORVASTATIN CALCIUM 40 MG: 40 TABLET, FILM COATED ORAL at 20:33

## 2018-12-26 RX ADMIN — ASPIRIN 81 MG 81 MG: 81 TABLET ORAL at 08:13

## 2018-12-26 RX ADMIN — CEFTRIAXONE SODIUM 2 G: 2 INJECTION, POWDER, FOR SOLUTION INTRAMUSCULAR; INTRAVENOUS at 20:33

## 2018-12-26 RX ADMIN — PANTOPRAZOLE SODIUM 40 MG: 40 INJECTION, POWDER, FOR SOLUTION INTRAVENOUS at 14:16

## 2018-12-26 ASSESSMENT — PAIN SCALES - GENERAL
PAINLEVEL_OUTOF10: 0

## 2018-12-26 NOTE — CONSULTS
[address] [facility]  HEART FAILURE PROGRAM      NAME:  Katie Dent  MEDICAL RECORD NUMBER:  [id number]  AGE: 68 y.o.    GENDER: male  : 1945  TODAY'S DATE:  2018    Subjective:     VISIT TYPE: evaluation     ADMITTING PHYSICIAN:  Hank Leon MD    PAST MEDICAL HISTORY        Diagnosis Date    Anemia     Arthritis     Cancer ([facility] [address] 2005    hairy cell leukemia    Colon polyps 2010    Diabetes mellitus ([facility]     Hepatitis C     Obesity     Splenomegaly 2010       SOCIAL HISTORY    Social History   Substance Use Topics    Smoking status: Former Smoker     Quit date: 1981    Smokeless tobacco: Never Used    Alcohol use No       ALLERGIES    Allergies   Allergen Reactions    Oxycodone Rash     Pt has no problem with hydrocodone       MEDICATIONS  Scheduled Meds:   pantoprazole  40 mg Intravenous BID    And    sodium chloride (PF)  10 mL Intravenous Daily    ferrous sulfate  324 mg Oral Daily with breakfast    sodium chloride flush  10 mL Intravenous 2 times per day    atorvastatin  40 mg Oral Nightly    metoprolol succinate  50 mg Oral Daily    lisinopril  20 mg Oral Daily    aspirin  81 mg Oral Daily    ticagrelor  90 mg Oral BID    tirofiban  25 mcg/kg Intravenous Once    furosemide  40 mg Intravenous BID    spironolactone  25 mg Oral Daily    cefTRIAXone (ROCEPHIN) IV  2 g Intravenous Q24H       ADMIT DATE: 2018      Objective:     ADMISSION DIAGNOSIS:   STEMI (ST elevation myocardial infarction) ([facility]) [I21.3]     /60   Pulse 76   Temp 98.5 °F (36.9 °C) (Oral)   Resp 20   Ht 5' 9\" (1.753 m)   Wt 262 lb 2 oz (118.9 kg)   SpO2 96%   BMI 38.71 kg/m²     ADMIT:  Weight: 263 lb 7.2 oz (119.5 kg)    TODAY: Weight: 262 lb 2 oz (118.9 kg)    Wt Readings from Last 25 Encounters:   18 262 lb 2 oz (118.9 kg)   17 267 lb (121.1 kg)   17 266 lb (120.7 kg)   17 267 lb (121.1 kg)   17 266 lb (120.7 kg)   17 272 lb (123.4
Hematology Consult    See dictation    A/P:  1. Hairy cell leukemia. His issue recently has been his anemia. Will check iron studies and likely give EPO while he is here. 2. Chest pain-rule out for MI    3. Abdominal pain--consider GI consult while here. Thanks for the consult. Will follow.     Sumit Mcgarry MD
change in  vision/hearing/smell/taste, weakness, neuropathy, skin rashes,  productive cough, urinary or bowel prolapse or incontinence, petechiae,  purpura, skin rashes, pruritus, hallucinations, nasal congestion or  drainage, depression, anxiety, suicidal ideations, melena, or  hematochezia. He has mild to moderate fatigue. His 10-system review of  systems is otherwise negative. PHYSICAL EXAMINATION:  VITAL SIGNS:  He is afebrile with normal vital signs. GENERAL:  He is in no acute distress. HEENT:  His pupils are round and reactive to light and accommodation. Extraocular muscles are intact. NECK:  He has no jugular venous distention. No thyromegaly. His  oropharynx is clear. He has no carotid bruits. He has no palpable  cervical, supraclavicular, or axillary lymphadenopathy. HEART:  Regular rate and rhythm. LUNGS:  Clear to auscultation bilaterally. ABDOMEN:  Nondistended, nontender with bowel sounds x4. No  hepatosplenomegaly. EXTREMITIES:  He has no peripheral clubbing, cyanosis, or edema. NEUROLOGIC EXAM:  Nonfocal.    LABORATORY DATA:  His white blood cell count is 9.2, hemoglobin 10. 2. platelets of 772. ASSESSMENT AND PLAN:  1. History of hairy cell leukemia/anemia. His anemia is likely  multifactorial.  He does have chronic iron deficiency. I will recheck  his iron studies and give IV iron if they are low. He also has chronic  kidney disease which is contributing. I will consider giving Aranesp. I did explain that it is associated with a slightly higher risk of  stroke, heart attacks and blood clots and he is willing to proceed if I  order it. 2.  Abdominal pain. I will consider a GI consult. 3.  Chest pain. Cardiology is following. Thank you for the consultation. I will follow closely.         Petrona Rowell MD    D: 12/25/2018 21:36:01       T: 12/26/2018 2:44:35     [email or web address]  Job#: [id number]     Doc#: [id number]    CC:

## 2018-12-27 VITALS
BODY MASS INDEX: 38.11 KG/M2 | OXYGEN SATURATION: 97 % | DIASTOLIC BLOOD PRESSURE: 65 MMHG | HEART RATE: 84 BPM | SYSTOLIC BLOOD PRESSURE: 102 MMHG | HEIGHT: 69 IN | WEIGHT: 257.28 LBS | TEMPERATURE: 98.2 F | RESPIRATION RATE: 18 BRPM

## 2018-12-27 LAB
ALBUMIN SERPL-MCNC: 3.5 G/DL (ref 3.4–5)
ANION GAP SERPL CALCULATED.3IONS-SCNC: 15 MMOL/L (ref 3–16)
BASOPHILS ABSOLUTE: 0 K/UL (ref 0–0.2)
BASOPHILS RELATIVE PERCENT: 0.5 %
BLOOD CULTURE, ROUTINE: ABNORMAL
BUN BLDV-MCNC: 34 MG/DL (ref 7–20)
CALCIUM SERPL-MCNC: 8.8 MG/DL (ref 8.3–10.6)
CHLORIDE BLD-SCNC: 97 MMOL/L (ref 99–110)
CO2: 24 MMOL/L (ref 21–32)
CREAT SERPL-MCNC: 1.6 MG/DL (ref 0.8–1.3)
EOSINOPHILS ABSOLUTE: 0.1 K/UL (ref 0–0.6)
EOSINOPHILS RELATIVE PERCENT: 0.7 %
GFR AFRICAN AMERICAN: 52
GFR NON-AFRICAN AMERICAN: 43
GLUCOSE BLD-MCNC: 133 MG/DL (ref 70–99)
HCT VFR BLD CALC: 32.1 % (ref 40.5–52.5)
HEMATOLOGY PATH CONSULT: NO
HEMATOLOGY PATH CONSULT: NORMAL
HEMOGLOBIN: 10 G/DL (ref 13.5–17.5)
LYMPHOCYTES ABSOLUTE: 1.3 K/UL (ref 1–5.1)
LYMPHOCYTES RELATIVE PERCENT: 14.7 %
MCH RBC QN AUTO: 20.6 PG (ref 26–34)
MCHC RBC AUTO-ENTMCNC: 31.2 G/DL (ref 31–36)
MCV RBC AUTO: 66.1 FL (ref 80–100)
MONOCYTES ABSOLUTE: 4.6 K/UL (ref 0–1.3)
MONOCYTES RELATIVE PERCENT: 53 %
NEUTROPHILS ABSOLUTE: 2.7 K/UL (ref 1.7–7.7)
NEUTROPHILS RELATIVE PERCENT: 31.1 %
ORGANISM: ABNORMAL
PDW BLD-RTO: 22.5 % (ref 12.4–15.4)
PHOSPHORUS: 3.4 MG/DL (ref 2.5–4.9)
PLATELET # BLD: 139 K/UL (ref 135–450)
PMV BLD AUTO: 8 FL (ref 5–10.5)
POTASSIUM SERPL-SCNC: 4 MMOL/L (ref 3.5–5.1)
RBC # BLD: 4.86 M/UL (ref 4.2–5.9)
SODIUM BLD-SCNC: 136 MMOL/L (ref 136–145)
WBC # BLD: 8.6 K/UL (ref 4–11)

## 2018-12-27 PROCEDURE — 2580000003 HC RX 258: Performed by: INTERNAL MEDICINE

## 2018-12-27 PROCEDURE — 6360000002 HC RX W HCPCS: Performed by: INTERNAL MEDICINE

## 2018-12-27 PROCEDURE — 36415 COLL VENOUS BLD VENIPUNCTURE: CPT

## 2018-12-27 PROCEDURE — 99239 HOSP IP/OBS DSCHRG MGMT >30: CPT | Performed by: INTERNAL MEDICINE

## 2018-12-27 PROCEDURE — 80069 RENAL FUNCTION PANEL: CPT

## 2018-12-27 PROCEDURE — 85025 COMPLETE CBC W/AUTO DIFF WBC: CPT

## 2018-12-27 PROCEDURE — C9113 INJ PANTOPRAZOLE SODIUM, VIA: HCPCS | Performed by: INTERNAL MEDICINE

## 2018-12-27 PROCEDURE — 94760 N-INVAS EAR/PLS OXIMETRY 1: CPT

## 2018-12-27 PROCEDURE — 6370000000 HC RX 637 (ALT 250 FOR IP): Performed by: INTERNAL MEDICINE

## 2018-12-27 RX ORDER — TORSEMIDE 20 MG/1
20 TABLET ORAL DAILY
Qty: 30 TABLET | Refills: 3 | Status: SHIPPED | OUTPATIENT
Start: 2018-12-30 | End: 2019-01-08 | Stop reason: DRUGHIGH

## 2018-12-27 RX ORDER — ASPIRIN 81 MG/1
81 TABLET, CHEWABLE ORAL DAILY
Qty: 30 TABLET | Refills: 3 | Status: SHIPPED | OUTPATIENT
Start: 2018-12-28

## 2018-12-27 RX ORDER — PANTOPRAZOLE SODIUM 40 MG/1
40 TABLET, DELAYED RELEASE ORAL
Status: DISCONTINUED | OUTPATIENT
Start: 2018-12-28 | End: 2018-12-27 | Stop reason: HOSPADM

## 2018-12-27 RX ORDER — NITROGLYCERIN 0.4 MG/1
TABLET SUBLINGUAL
Qty: 25 TABLET | Refills: 3 | Status: SHIPPED | OUTPATIENT
Start: 2018-12-27 | End: 2022-05-19 | Stop reason: SDUPTHER

## 2018-12-27 RX ORDER — LISINOPRIL 20 MG/1
20 TABLET ORAL DAILY
Qty: 30 TABLET | Refills: 3 | Status: SHIPPED | OUTPATIENT
Start: 2018-12-30 | End: 2019-03-08 | Stop reason: SDUPTHER

## 2018-12-27 RX ORDER — ATORVASTATIN CALCIUM 40 MG/1
40 TABLET, FILM COATED ORAL NIGHTLY
Qty: 30 TABLET | Refills: 3 | Status: SHIPPED | OUTPATIENT
Start: 2018-12-27 | End: 2019-03-08 | Stop reason: SDUPTHER

## 2018-12-27 RX ORDER — TORSEMIDE 20 MG/1
20 TABLET ORAL DAILY
Status: DISCONTINUED | OUTPATIENT
Start: 2018-12-30 | End: 2018-12-27 | Stop reason: HOSPADM

## 2018-12-27 RX ORDER — METOPROLOL SUCCINATE 50 MG/1
50 TABLET, EXTENDED RELEASE ORAL DAILY
Qty: 30 TABLET | Refills: 3 | Status: SHIPPED | OUTPATIENT
Start: 2018-12-28 | End: 2019-03-08 | Stop reason: SDUPTHER

## 2018-12-27 RX ORDER — SPIRONOLACTONE 25 MG/1
25 TABLET ORAL DAILY
Qty: 30 TABLET | Refills: 3 | Status: SHIPPED | OUTPATIENT
Start: 2018-12-30 | End: 2019-05-21 | Stop reason: SDUPTHER

## 2018-12-27 RX ADMIN — PANTOPRAZOLE SODIUM 40 MG: 40 INJECTION, POWDER, FOR SOLUTION INTRAVENOUS at 08:19

## 2018-12-27 RX ADMIN — ASPIRIN 81 MG 81 MG: 81 TABLET ORAL at 08:20

## 2018-12-27 RX ADMIN — TICAGRELOR 90 MG: 90 TABLET ORAL at 08:20

## 2018-12-27 RX ADMIN — SPIRONOLACTONE 25 MG: 25 TABLET ORAL at 08:20

## 2018-12-27 RX ADMIN — METOPROLOL SUCCINATE 50 MG: 50 TABLET, EXTENDED RELEASE ORAL at 08:20

## 2018-12-27 RX ADMIN — FERROUS SULFATE TAB EC 324 MG (65 MG FE EQUIVALENT) 324 MG: 324 (65 FE) TABLET DELAYED RESPONSE at 08:20

## 2018-12-27 RX ADMIN — LISINOPRIL 20 MG: 20 TABLET ORAL at 08:20

## 2018-12-27 RX ADMIN — FUROSEMIDE 40 MG: 10 INJECTION, SOLUTION INTRAMUSCULAR; INTRAVENOUS at 08:20

## 2018-12-27 RX ADMIN — Medication 10 ML: at 08:21

## 2018-12-27 ASSESSMENT — PAIN SCALES - GENERAL
PAINLEVEL_OUTOF10: 0
PAINLEVEL_OUTOF10: 0

## 2018-12-27 NOTE — PROGRESS NOTES
DC order noted. DC Pharm med rec line notified. HF dc instructions are in place. Pt was seen by HF RN and consult completed. Pt has a follow up scheduled (first available appt and Dr Kelton Baez aware and approved/ verbal order) for 1/8 at 9:45.
Kell Beltrán, Pharmacist spoke with Dr Pina Gallagher who regarding positive blood cx, see new order for antibiotic
 MG per tablet  Commonly known as:  NORCO     Vitamin D3 2000 units Caps     warfarin 1 MG tablet  Commonly known as:  COUMADIN     warfarin 5 MG tablet  Commonly known as:  COUMADIN            ASK your doctor about these medications      ferrous sulfate 325 (65 Fe) MG tablet               Where to Get Your Medications        These medications were sent to [address], [address] [place] [address] -  [phone]  [address] [place] [address]      Phone:  [phone]   · aspirin 81 MG chewable tablet  · atorvastatin 40 MG tablet  · lisinopril 20 MG tablet  · metoprolol succinate 50 MG extended release tablet  · nitroGLYCERIN 0.4 MG SL tablet  · spironolactone 25 MG tablet  · torsemide 20 MG tablet       These medications were sent to [address] [place] [address], [address] [facility] [phone]  [address] [place] [address], [address] [place] [address] Street [address]      Phone:  [phone]   · ticagrelor 90 MG Tabs tablet         Yessica BlakeD  Heart Failure Discharge Medication Reconciliation Program  [phone]
color, texture, turgor normal, no rashes or lesions   Lymph nodes:   Cervical, supraclavicular, and axillary nodes normal   Neurologic:   CNII-XII intact. Normal strength, sensation and reflexes       throughout       ECG: normal sinus rhythm, no blocks or conduction defects, no ischemic changes. Data Review  CBC:   Lab Results   Component Value Date    WBC 7.9 12/26/2018    RBC 4.65 12/26/2018    RBC 4.93 06/21/2017       Assessment:     Active Problems:    STEMI (ST elevation myocardial infarction) ([facility] [address]  Resolved Problems:    * No resolved hospital problems. *      Plan:     1. Anemia. His anemia is multifactorial and due to chronic kidney disease as well as intermittent iron deficiency. I doubt that his hairy cell leukemia is out of remission. I will give IV iron and Aranesp today. I explained that Aranesp is associated with a slightly  higher risk of strokes, heart attacks, blood clots and he is willing to proceed. 2.  Abdominal pain. He does have a history of ulcers as well as an esophageal stricture. He is due for an EGD. I will consult GI while he is here. 3.  Chest pain. Cardiology is following.         Electronically signed by Genet Musa MD on 12/26/2018 at 11:56 AM

## 2018-12-27 NOTE — DISCHARGE INSTR - COC
NCOP:352021031}  Bathing  {CHP DME LWJD:151794501}  Dressing  {CHP DME OPMA:601222827}  Toileting  {CHP DME HHSO:322573206}  Feeding  {CHP DME PIYZ:855295971}  Med Admin  {CHP DME YPUO:087703754}  Med Delivery   { ELVIRA MED Delivery:069783960}    Wound Care Documentation and Therapy:  Incision 17 Knee Left (Active)   Number of days: 512        Elimination:  Continence:   · Bowel: {YES / CM:71441}  · Bladder: {YES / QT:46859}  Urinary Catheter: {Urinary Catheter:751484260}   Colostomy/Ileostomy/Ileal Conduit: {YES / IV:30897}       Date of Last BM: ***    Intake/Output Summary (Last 24 hours) at 18  Last data filed at 18 7768   Gross per 24 hour   Intake           1159.7 ml   Output             1985 ml   Net           -825.3 ml     I/O last 3 completed shifts:   In: 1149.7 [P.O.:1080; I.V.:20; IV Piggyback:49.7]  Out: 1950 [Urine:1950]    Safety Concerns:     [address]ia Safety Concerns:144994267}    Impairments/Disabilities:      [address] Ad[address]ia Impairments/Disabilities:774659037}    Nutrition Therapy:  Current Nutrition Therapy:   508 [address]ia Diet List:495295711}    Routes of Feeding: {CHP DME Other Feedings:269269005}  Liquids: {Slp liquid thickness:23280}  Daily Fluid Restriction: {CHP DME Yes amt example:109243293}  Last Modified Barium Swallow with Video (Video Swallowing Test): {Done Not Done OBCX:316321223}    Treatments at the Time of Hospital Discharge:   Respiratory Treatments: ***  Oxygen Therapy:  {Therapy; copd oxygen:76195}  Ventilator:    { CC Vent DLFT:439585373}    Rehab Therapies: {THERAPEUTIC INTERVENTION:6674800125}  Weight Bearing Status/Restrictions: 508 c-[place] Weight Bearin}  Other Medical Equipment (for information only, NOT a DME order):  {EQUIPMENT:492041692}  Other Treatments: ***    Patient's personal belongings (please select all that are sent with patient):  {ALINP DME Belongings:087073299}    RN SIGNATURE:  {Esignature:457253468}    CASE MANAGEMENT/SOCIAL WORK

## 2018-12-27 NOTE — DISCHARGE SUMMARY
[address]  [id number] 69 y.o. 1945    Admit date: 12/24/2018    Discharge date:      Admitting Physician: Keo Euceda MD   Discharge Physician: Keo Euceda     Admission Diagnoses: STEMI (ST elevation myocardial infarction) [facility]) [I21.3]    Discharge Diagnoses:     Discharged Condition: good    Hospital Course: Jonnathan Horner was admitted with chest pain off several hours duration ST elevations with reciprocal changes in the high lateral and inferior leads respectively. He was also had profound hypoxia and additional changes on his lungs. He underwent diagnostic angiography and was found to have an occluded second diagonal, which was opened and stented. His first diagonal is also 80% ostial lesion. His EF is 35% and is TTP was 35, explaining his interstitial changes and suggestive of cardiomyopathy    The patient had an echocardiogram which showed EF of 35% with global LV dysfunction. He was treated with IV Lasix with excellent diuresis. However, his creatinine was up to 1.6. I wanted the patient to stay in the hospital, but he insisted on going home    Were going to hold his metformin torsemide, spironolactone and lisinopril until Sunday  I will see him in a week        Significant Diagnostic Studies:     Coronary angiogram and intervention: 12/24/2018  1. Branch vessel CAD involvingD1 & D2. The D2 is the culprit vessel with subtotal occlusion. D1 has an 80% ostial stenosis. 2.  Markedly elevated left heart filling pressures. 3.  Cardiomyopathy with EF of 35%; LV dysfunction out of proportion to the coronary artery disease. 4.  Successful PCI and stenting of subtotally occluded diagonal branch. Lesion reduced from 100% to 0%. 2.5 x 23-mm  JOHNSON stent was Used. Echocardiogram 12/26/2018   Normal LV size with global systolic dysfunction. EF 35%.  Distal anterior  hypokinesis.   The left atrium is severely dilated.   Normal right

## 2018-12-28 LAB — H PYLORI ANTIGEN STOOL: NEGATIVE

## 2018-12-30 LAB — BLOOD CULTURE, ROUTINE: NORMAL

## 2018-12-31 LAB — CULTURE, BLOOD 2: NORMAL

## 2019-01-08 ENCOUNTER — OFFICE VISIT (OUTPATIENT)
Dept: CARDIOLOGY CLINIC | Age: 74
End: 2019-01-08
Payer: MEDICARE

## 2019-01-08 VITALS
DIASTOLIC BLOOD PRESSURE: 72 MMHG | OXYGEN SATURATION: 96 % | HEIGHT: 69 IN | HEART RATE: 83 BPM | SYSTOLIC BLOOD PRESSURE: 112 MMHG | BODY MASS INDEX: 38.66 KG/M2 | WEIGHT: 261 LBS

## 2019-01-08 DIAGNOSIS — D75.9: ICD-10-CM

## 2019-01-08 DIAGNOSIS — I25.5 ISCHEMIC CARDIOMYOPATHY: ICD-10-CM

## 2019-01-08 DIAGNOSIS — I21.01 ST ELEVATION MYOCARDIAL INFARCTION INVOLVING LEFT MAIN CORONARY ARTERY (HCC): Primary | ICD-10-CM

## 2019-01-08 LAB
ALBUMIN SERPL-MCNC: 4.4 G/DL (ref 3.4–5)
ANION GAP SERPL CALCULATED.3IONS-SCNC: 15 MMOL/L (ref 3–16)
BUN BLDV-MCNC: 33 MG/DL (ref 7–20)
CALCIUM SERPL-MCNC: 9.4 MG/DL (ref 8.3–10.6)
CHLORIDE BLD-SCNC: 100 MMOL/L (ref 99–110)
CO2: 22 MMOL/L (ref 21–32)
CREAT SERPL-MCNC: 1.7 MG/DL (ref 0.8–1.3)
GFR AFRICAN AMERICAN: 48
GFR NON-AFRICAN AMERICAN: 40
GLUCOSE BLD-MCNC: 181 MG/DL (ref 70–99)
PHOSPHORUS: 3.8 MG/DL (ref 2.5–4.9)
POTASSIUM SERPL-SCNC: 4.9 MMOL/L (ref 3.5–5.1)
SODIUM BLD-SCNC: 137 MMOL/L (ref 136–145)

## 2019-01-08 PROCEDURE — 3017F COLORECTAL CA SCREEN DOC REV: CPT | Performed by: INTERNAL MEDICINE

## 2019-01-08 PROCEDURE — G8484 FLU IMMUNIZE NO ADMIN: HCPCS | Performed by: INTERNAL MEDICINE

## 2019-01-08 PROCEDURE — 1036F TOBACCO NON-USER: CPT | Performed by: INTERNAL MEDICINE

## 2019-01-08 PROCEDURE — 99214 OFFICE O/P EST MOD 30 MIN: CPT | Performed by: INTERNAL MEDICINE

## 2019-01-08 PROCEDURE — 93000 ELECTROCARDIOGRAM COMPLETE: CPT | Performed by: INTERNAL MEDICINE

## 2019-01-08 PROCEDURE — 1123F ACP DISCUSS/DSCN MKR DOCD: CPT | Performed by: INTERNAL MEDICINE

## 2019-01-08 PROCEDURE — 4040F PNEUMOC VAC/ADMIN/RCVD: CPT | Performed by: INTERNAL MEDICINE

## 2019-01-08 PROCEDURE — 1101F PT FALLS ASSESS-DOCD LE1/YR: CPT | Performed by: INTERNAL MEDICINE

## 2019-01-08 PROCEDURE — G8417 CALC BMI ABV UP PARAM F/U: HCPCS | Performed by: INTERNAL MEDICINE

## 2019-01-08 PROCEDURE — 1111F DSCHRG MED/CURRENT MED MERGE: CPT | Performed by: INTERNAL MEDICINE

## 2019-01-08 PROCEDURE — G8427 DOCREV CUR MEDS BY ELIG CLIN: HCPCS | Performed by: INTERNAL MEDICINE

## 2019-01-08 PROCEDURE — G8598 ASA/ANTIPLAT THER USED: HCPCS | Performed by: INTERNAL MEDICINE

## 2019-01-08 RX ORDER — TORSEMIDE 20 MG/1
20 TABLET ORAL DAILY
Qty: 30 TABLET | Refills: 5 | Status: SHIPPED
Start: 2019-01-08 | End: 2021-03-05 | Stop reason: SDUPTHER

## 2019-01-23 RX ORDER — CLOPIDOGREL BISULFATE 75 MG/1
75 TABLET ORAL DAILY
Qty: 34 TABLET | Refills: 5 | Status: SHIPPED | OUTPATIENT
Start: 2019-01-23 | End: 2020-02-17 | Stop reason: ALTCHOICE

## 2019-03-08 RX ORDER — LISINOPRIL 20 MG/1
20 TABLET ORAL DAILY
Qty: 90 TABLET | Refills: 3 | Status: SHIPPED | OUTPATIENT
Start: 2019-03-08 | End: 2019-09-20 | Stop reason: SDUPTHER

## 2019-03-08 RX ORDER — ATORVASTATIN CALCIUM 40 MG/1
40 TABLET, FILM COATED ORAL NIGHTLY
Qty: 90 TABLET | Refills: 3 | Status: SHIPPED | OUTPATIENT
Start: 2019-03-08 | End: 2020-05-08 | Stop reason: SDUPTHER

## 2019-03-08 RX ORDER — METOPROLOL SUCCINATE 50 MG/1
50 TABLET, EXTENDED RELEASE ORAL DAILY
Qty: 90 TABLET | Refills: 3 | Status: SHIPPED | OUTPATIENT
Start: 2019-03-08 | End: 2020-02-17

## 2019-03-11 ENCOUNTER — OFFICE VISIT (OUTPATIENT)
Dept: CARDIOLOGY CLINIC | Age: 74
End: 2019-03-11
Payer: MEDICARE

## 2019-03-11 VITALS
DIASTOLIC BLOOD PRESSURE: 70 MMHG | BODY MASS INDEX: 39.4 KG/M2 | WEIGHT: 266 LBS | SYSTOLIC BLOOD PRESSURE: 122 MMHG | HEIGHT: 69 IN | OXYGEN SATURATION: 98 % | HEART RATE: 96 BPM

## 2019-03-11 DIAGNOSIS — I42.9 CARDIOMYOPATHY, UNSPECIFIED TYPE (HCC): Primary | ICD-10-CM

## 2019-03-11 DIAGNOSIS — I25.10 CORONARY ARTERY DISEASE INVOLVING NATIVE CORONARY ARTERY OF NATIVE HEART WITHOUT ANGINA PECTORIS: ICD-10-CM

## 2019-03-11 PROCEDURE — 1036F TOBACCO NON-USER: CPT | Performed by: INTERNAL MEDICINE

## 2019-03-11 PROCEDURE — 1101F PT FALLS ASSESS-DOCD LE1/YR: CPT | Performed by: INTERNAL MEDICINE

## 2019-03-11 PROCEDURE — 4040F PNEUMOC VAC/ADMIN/RCVD: CPT | Performed by: INTERNAL MEDICINE

## 2019-03-11 PROCEDURE — G8427 DOCREV CUR MEDS BY ELIG CLIN: HCPCS | Performed by: INTERNAL MEDICINE

## 2019-03-11 PROCEDURE — 99214 OFFICE O/P EST MOD 30 MIN: CPT | Performed by: INTERNAL MEDICINE

## 2019-03-11 PROCEDURE — 3017F COLORECTAL CA SCREEN DOC REV: CPT | Performed by: INTERNAL MEDICINE

## 2019-03-11 PROCEDURE — G8417 CALC BMI ABV UP PARAM F/U: HCPCS | Performed by: INTERNAL MEDICINE

## 2019-03-11 PROCEDURE — 1123F ACP DISCUSS/DSCN MKR DOCD: CPT | Performed by: INTERNAL MEDICINE

## 2019-03-11 PROCEDURE — G8598 ASA/ANTIPLAT THER USED: HCPCS | Performed by: INTERNAL MEDICINE

## 2019-03-11 PROCEDURE — G8484 FLU IMMUNIZE NO ADMIN: HCPCS | Performed by: INTERNAL MEDICINE

## 2019-05-21 RX ORDER — SPIRONOLACTONE 25 MG/1
TABLET ORAL
Qty: 30 TABLET | Refills: 2 | Status: SHIPPED | OUTPATIENT
Start: 2019-05-21 | End: 2019-09-21 | Stop reason: SDUPTHER

## 2019-09-19 NOTE — PROGRESS NOTES
[address]  Cardiology Progress Note      Niles Blizzard  1945, 69 y.o.      CC:  \" I need to have a knee replacement. \"              No primary care provider on file.:      HPI:   This is a 69 y.o. male  with history of diabetes, hepatitis C and basal cell leukemia who came in with chest pain and was found to have CHF as well as large diagonal occlusion that was opened and stented in 2018. EF at that time was 35%. The patient is on optimal medical therapy including ACE inhibitor spironolactone beta-blocker and statins    Here for routine check up of CAD. Says he has been well. Uses a walker for ambulation assistance. Has no difficulty breathing. Inquiring when he can have a right knee replacement. Denies any chest pain, pressure, tightness, dyspnea, palpitations, dizziness or lightheaded. Sleeps on his side. Reports compliance with medication and tolerating. No abnormal bruising or bleeding. Past Medical History:   Diagnosis Date    Anemia     Arthritis     Cancer ([facility] [address]     hairy cell leukemia    Colon polyps 2010    Diabetes mellitus ([facility] [address] [facility]     Hepatitis C     Obesity     Splenomegaly       Past Surgical History:   Procedure Laterality Date    BONE MARROW BIOPSY      COLONOSCOPY      LIVER BIOPSY      TOTAL KNEE ARTHROPLASTY Left 2017      No family history on file.    Social History     Tobacco Use    Smoking status: Former Smoker     Last attempt to quit: 1981     Years since quittin.6    Smokeless tobacco: Never Used   Substance Use Topics    Alcohol use: No     Alcohol/week: 0.0 standard drinks    Drug use: No     Allergies   Allergen Reactions    Oxycodone Rash     Pt has no problem with hydrocodone         Review of Systems -   Constitutional: Negative for weight gain/loss; malaise, fever  Respiratory: Negative for Asthma;  cough and hemoptysis  Cardiovascular: Negative for palpitations,dizziness   Gastrointestinal: Negative for chewable tablet Take 1 tablet by mouth daily 30 tablet 3    nitroGLYCERIN (NITROSTAT) 0.4 MG SL tablet up to max of 3 total doses. If no relief after 1 dose, call 911. 25 tablet 3    metFORMIN (GLUCOPHAGE) 1000 MG tablet Take 0.5 tablets by mouth daily (with breakfast) 60 tablet 3    ferrous sulfate 325 (65 FE) MG tablet Take 325 mg by mouth daily (with breakfast). Labs:   Lab Results   Component Value Date    HDL 25 2010    LDLCALC 53 2010    TRIG 65 2010         EK2019, Sinus Rhythm      ECHO:2018  Normal LV size with global systolic dysfunction. EF 35%. Distal anterior  hypokinesis. The left atrium is severely dilated. Normal right ventricular size and function. Trivial Mitral and Tricuspid regurgitation. Corornary angiogram  & Intervention:2018  1.  There is branch vessel disease in this codominant circulation.  The arteries are large. 2.  The left main is free of disease. 3.  LAD is a large vessel, it reaches the apex.  D1: 80- 90% ostial lesion.  D2: subtotally occluded and is the culprit vessel. 4.  The left circumflex artery is free of disease.  It is a codominant vessel. 5.  The codominant right also has no major obstructive disease.     Successful PCI and stenting of subtotally occluded diagonal  branch.  Lesion reduced from 100% to 0%.  2.5 x 23-mm JOHNSON stent was  used.     RACHELLE Staley initially accessed the groin to the right femoral.  However, we  probably entered dissection plane as we could not advance the wire.  We  therefore accessed the left femoral artery. ASSESSMENT AND PLAN:       STEMI 18  High lateral infarct secondary to occlusion of the second diagonal branch. This was ballooned and stented using 2.5×23 mm JOHNSON stent. First diagonal also has 80% ostial lesion.   Other major vessels are free of disease  EF 35%    Denies any angina  Continue DAPT and statin  Lipid panel today     Cardiomyopathy  I think the LV dysfunction is

## 2019-09-20 ENCOUNTER — OFFICE VISIT (OUTPATIENT)
Dept: CARDIOLOGY CLINIC | Age: 74
End: 2019-09-20
Payer: MEDICARE

## 2019-09-20 VITALS
BODY MASS INDEX: 42.8 KG/M2 | HEIGHT: 69 IN | WEIGHT: 289 LBS | DIASTOLIC BLOOD PRESSURE: 74 MMHG | HEART RATE: 78 BPM | SYSTOLIC BLOOD PRESSURE: 136 MMHG | OXYGEN SATURATION: 96 %

## 2019-09-20 DIAGNOSIS — I25.2 HISTORY OF ST ELEVATION MYOCARDIAL INFARCTION (STEMI): Primary | ICD-10-CM

## 2019-09-20 DIAGNOSIS — I42.9 CARDIOMYOPATHY, UNSPECIFIED TYPE (HCC): ICD-10-CM

## 2019-09-20 DIAGNOSIS — D75.9: ICD-10-CM

## 2019-09-20 PROCEDURE — 1036F TOBACCO NON-USER: CPT | Performed by: INTERNAL MEDICINE

## 2019-09-20 PROCEDURE — 1123F ACP DISCUSS/DSCN MKR DOCD: CPT | Performed by: INTERNAL MEDICINE

## 2019-09-20 PROCEDURE — 3017F COLORECTAL CA SCREEN DOC REV: CPT | Performed by: INTERNAL MEDICINE

## 2019-09-20 PROCEDURE — G8598 ASA/ANTIPLAT THER USED: HCPCS | Performed by: INTERNAL MEDICINE

## 2019-09-20 PROCEDURE — G8417 CALC BMI ABV UP PARAM F/U: HCPCS | Performed by: INTERNAL MEDICINE

## 2019-09-20 PROCEDURE — 4040F PNEUMOC VAC/ADMIN/RCVD: CPT | Performed by: INTERNAL MEDICINE

## 2019-09-20 PROCEDURE — 99214 OFFICE O/P EST MOD 30 MIN: CPT | Performed by: INTERNAL MEDICINE

## 2019-09-20 PROCEDURE — G8427 DOCREV CUR MEDS BY ELIG CLIN: HCPCS | Performed by: INTERNAL MEDICINE

## 2019-09-20 RX ORDER — LISINOPRIL 20 MG/1
20 TABLET ORAL DAILY
Qty: 90 TABLET | Refills: 3 | Status: SHIPPED | OUTPATIENT
Start: 2019-09-20 | End: 2020-05-29 | Stop reason: SDUPTHER

## 2019-09-23 DIAGNOSIS — I25.2 HISTORY OF ST ELEVATION MYOCARDIAL INFARCTION (STEMI): ICD-10-CM

## 2019-09-23 LAB
ANION GAP SERPL CALCULATED.3IONS-SCNC: 12 MMOL/L (ref 3–16)
BUN BLDV-MCNC: 21 MG/DL (ref 7–20)
CALCIUM SERPL-MCNC: 9.1 MG/DL (ref 8.3–10.6)
CHLORIDE BLD-SCNC: 106 MMOL/L (ref 99–110)
CHOLESTEROL, TOTAL: 114 MG/DL (ref 0–199)
CO2: 22 MMOL/L (ref 21–32)
CREAT SERPL-MCNC: 1.4 MG/DL (ref 0.8–1.3)
GFR AFRICAN AMERICAN: >60
GFR NON-AFRICAN AMERICAN: 50
GLUCOSE BLD-MCNC: 147 MG/DL (ref 70–99)
HDLC SERPL-MCNC: 29 MG/DL (ref 40–60)
LDL CHOLESTEROL CALCULATED: 75 MG/DL
POTASSIUM SERPL-SCNC: 4.7 MMOL/L (ref 3.5–5.1)
SODIUM BLD-SCNC: 140 MMOL/L (ref 136–145)
TRIGL SERPL-MCNC: 52 MG/DL (ref 0–150)
VLDLC SERPL CALC-MCNC: 10 MG/DL

## 2019-09-23 RX ORDER — SPIRONOLACTONE 25 MG/1
TABLET ORAL
Qty: 30 TABLET | Refills: 5 | Status: ON HOLD | OUTPATIENT
Start: 2019-09-23 | End: 2021-05-04 | Stop reason: ALTCHOICE

## 2020-01-22 ENCOUNTER — HOSPITAL ENCOUNTER (EMERGENCY)
Age: 75
Discharge: HOME | End: 2020-01-22
Payer: MEDICARE

## 2020-01-22 ENCOUNTER — APPOINTMENT (OUTPATIENT)
Dept: CT IMAGING | Age: 75
End: 2020-01-22
Payer: MEDICARE

## 2020-01-22 VITALS
RESPIRATION RATE: 18 BRPM | SYSTOLIC BLOOD PRESSURE: 160 MMHG | WEIGHT: 288.36 LBS | DIASTOLIC BLOOD PRESSURE: 102 MMHG | HEIGHT: 68 IN | HEART RATE: 94 BPM | OXYGEN SATURATION: 95 % | BODY MASS INDEX: 43.7 KG/M2 | TEMPERATURE: 99.5 F

## 2020-01-22 LAB
A/G RATIO: 1.1 (ref 1.1–2.2)
ALBUMIN SERPL-MCNC: 4 G/DL (ref 3.4–5)
ALP BLD-CCNC: 57 U/L (ref 40–129)
ALT SERPL-CCNC: 9 U/L (ref 10–40)
ANION GAP SERPL CALCULATED.3IONS-SCNC: 13 MMOL/L (ref 3–16)
ANISOCYTOSIS: ABNORMAL
AST SERPL-CCNC: 13 U/L (ref 15–37)
BASOPHILS ABSOLUTE: 0.1 K/UL (ref 0–0.2)
BASOPHILS RELATIVE PERCENT: 1 %
BILIRUB SERPL-MCNC: 0.6 MG/DL (ref 0–1)
BILIRUBIN URINE: NEGATIVE
BLOOD, URINE: ABNORMAL
BUN BLDV-MCNC: 23 MG/DL (ref 7–20)
BURR CELLS: ABNORMAL
CALCIUM SERPL-MCNC: 9.7 MG/DL (ref 8.3–10.6)
CHLORIDE BLD-SCNC: 104 MMOL/L (ref 99–110)
CLARITY: CLEAR
CO2: 21 MMOL/L (ref 21–32)
COLOR: YELLOW
CREAT SERPL-MCNC: 1.4 MG/DL (ref 0.8–1.3)
EOSINOPHILS ABSOLUTE: 0.1 K/UL (ref 0–0.6)
EOSINOPHILS RELATIVE PERCENT: 1 %
EPITHELIAL CELLS, UA: 1 /HPF (ref 0–5)
GFR AFRICAN AMERICAN: >60
GFR NON-AFRICAN AMERICAN: 50
GLOBULIN: 3.5 G/DL
GLUCOSE BLD-MCNC: 172 MG/DL (ref 70–99)
GLUCOSE URINE: NEGATIVE MG/DL
HCT VFR BLD CALC: 37.7 % (ref 40.5–52.5)
HEMATOLOGY PATH CONSULT: NO
HEMOGLOBIN: 12.1 G/DL (ref 13.5–17.5)
HYALINE CASTS: 1 /LPF (ref 0–8)
KETONES, URINE: NEGATIVE MG/DL
LEUKOCYTE ESTERASE, URINE: NEGATIVE
LIPASE: 37 U/L (ref 13–60)
LYMPHOCYTES ABSOLUTE: 1 K/UL (ref 1–5.1)
LYMPHOCYTES RELATIVE PERCENT: 9 %
MCH RBC QN AUTO: 23.1 PG (ref 26–34)
MCHC RBC AUTO-ENTMCNC: 32.2 G/DL (ref 31–36)
MCV RBC AUTO: 71.8 FL (ref 80–100)
MICROSCOPIC EXAMINATION: YES
MONOCYTES ABSOLUTE: 5.9 K/UL (ref 0–1.3)
MONOCYTES RELATIVE PERCENT: 55 %
NEUTROPHILS ABSOLUTE: 3.6 K/UL (ref 1.7–7.7)
NEUTROPHILS RELATIVE PERCENT: 34 %
NITRITE, URINE: NEGATIVE
OVALOCYTES: ABNORMAL
PDW BLD-RTO: 19.8 % (ref 12.4–15.4)
PH UA: 5 (ref 5–8)
PLATELET # BLD: 112 K/UL (ref 135–450)
PMV BLD AUTO: 6.6 FL (ref 5–10.5)
POIKILOCYTES: ABNORMAL
POTASSIUM SERPL-SCNC: 4.7 MMOL/L (ref 3.5–5.1)
PROTEIN UA: 30 MG/DL
RBC # BLD: 5.25 M/UL (ref 4.2–5.9)
RBC UA: 2 /HPF (ref 0–4)
SCHISTOCYTES: ABNORMAL
SLIDE REVIEW: ABNORMAL
SODIUM BLD-SCNC: 138 MMOL/L (ref 136–145)
SPECIFIC GRAVITY UA: 1.02 (ref 1–1.03)
TOTAL PROTEIN: 7.5 G/DL (ref 6.4–8.2)
URINE REFLEX TO CULTURE: ABNORMAL
URINE TYPE: ABNORMAL
UROBILINOGEN, URINE: 0.2 E.U./DL
WBC # BLD: 10.7 K/UL (ref 4–11)
WBC UA: 1 /HPF (ref 0–5)

## 2020-01-22 PROCEDURE — 85025 COMPLETE CBC W/AUTO DIFF WBC: CPT

## 2020-01-22 PROCEDURE — 6360000004 HC RX CONTRAST MEDICATION: Performed by: NURSE PRACTITIONER

## 2020-01-22 PROCEDURE — 2580000003 HC RX 258: Performed by: NURSE PRACTITIONER

## 2020-01-22 PROCEDURE — 80053 COMPREHEN METABOLIC PANEL: CPT

## 2020-01-22 PROCEDURE — 36415 COLL VENOUS BLD VENIPUNCTURE: CPT

## 2020-01-22 PROCEDURE — 81001 URINALYSIS AUTO W/SCOPE: CPT

## 2020-01-22 PROCEDURE — 96374 THER/PROPH/DIAG INJ IV PUSH: CPT

## 2020-01-22 PROCEDURE — 74177 CT ABD & PELVIS W/CONTRAST: CPT

## 2020-01-22 PROCEDURE — 99284 EMERGENCY DEPT VISIT MOD MDM: CPT

## 2020-01-22 PROCEDURE — 83690 ASSAY OF LIPASE: CPT

## 2020-01-22 PROCEDURE — 6360000002 HC RX W HCPCS: Performed by: NURSE PRACTITIONER

## 2020-01-22 RX ORDER — ONDANSETRON 4 MG/1
4 TABLET, FILM COATED ORAL EVERY 8 HOURS PRN
Qty: 20 TABLET | Refills: 0 | Status: SHIPPED | OUTPATIENT
Start: 2020-01-22 | End: 2020-02-17

## 2020-01-22 RX ORDER — 0.9 % SODIUM CHLORIDE 0.9 %
1000 INTRAVENOUS SOLUTION INTRAVENOUS ONCE
Status: COMPLETED | OUTPATIENT
Start: 2020-01-22 | End: 2020-01-22

## 2020-01-22 RX ORDER — MORPHINE SULFATE 4 MG/ML
4 INJECTION, SOLUTION INTRAMUSCULAR; INTRAVENOUS ONCE
Status: COMPLETED | OUTPATIENT
Start: 2020-01-22 | End: 2020-01-22

## 2020-01-22 RX ORDER — ONDANSETRON 2 MG/ML
4 INJECTION INTRAMUSCULAR; INTRAVENOUS EVERY 30 MIN PRN
Status: DISCONTINUED | OUTPATIENT
Start: 2020-01-22 | End: 2020-01-22 | Stop reason: HOSPADM

## 2020-01-22 RX ORDER — DICYCLOMINE HYDROCHLORIDE 10 MG/1
10 CAPSULE ORAL EVERY 6 HOURS PRN
Qty: 20 CAPSULE | Refills: 0 | Status: SHIPPED | OUTPATIENT
Start: 2020-01-22 | End: 2020-02-17

## 2020-01-22 RX ADMIN — MORPHINE SULFATE 4 MG: 4 INJECTION, SOLUTION INTRAMUSCULAR; INTRAVENOUS at 11:56

## 2020-01-22 RX ADMIN — IOPAMIDOL 75 ML: 755 INJECTION, SOLUTION INTRAVENOUS at 12:20

## 2020-01-22 RX ADMIN — SODIUM CHLORIDE 1000 ML: 9 INJECTION, SOLUTION INTRAVENOUS at 11:59

## 2020-01-22 ASSESSMENT — ENCOUNTER SYMPTOMS
BACK PAIN: 0
VOMITING: 0
SORE THROAT: 0
SHORTNESS OF BREATH: 0
WHEEZING: 0
NAUSEA: 1
COLOR CHANGE: 0
COUGH: 0
ABDOMINAL PAIN: 1
DIARRHEA: 0

## 2020-01-22 ASSESSMENT — PAIN DESCRIPTION - PAIN TYPE: TYPE: ACUTE PAIN

## 2020-01-22 ASSESSMENT — PAIN SCALES - GENERAL
PAINLEVEL_OUTOF10: 7
PAINLEVEL_OUTOF10: 3
PAINLEVEL_OUTOF10: 7
PAINLEVEL_OUTOF10: 3

## 2020-01-22 ASSESSMENT — PAIN DESCRIPTION - ORIENTATION: ORIENTATION: RIGHT;UPPER

## 2020-01-22 ASSESSMENT — PAIN DESCRIPTION - LOCATION: LOCATION: ABDOMEN

## 2020-01-22 ASSESSMENT — PAIN DESCRIPTION - DESCRIPTORS: DESCRIPTORS: ACHING

## 2020-01-22 ASSESSMENT — PAIN DESCRIPTION - FREQUENCY: FREQUENCY: CONTINUOUS

## 2020-01-22 NOTE — ED PROVIDER NOTES
TABLET    Take 1 tablet by mouth nightly    CLOPIDOGREL (PLAVIX) 75 MG TABLET    Take 1 tablet by mouth daily Loading dose of 300 mg ( 4 tablets) on day 1 only    FERROUS SULFATE 325 (65 FE) MG TABLET    Take 325 mg by mouth daily (with breakfast). LISINOPRIL (PRINIVIL;ZESTRIL) 20 MG TABLET    Take 1 tablet by mouth daily    METFORMIN (GLUCOPHAGE) 1000 MG TABLET    Take 0.5 tablets by mouth daily (with breakfast)    METOPROLOL SUCCINATE (TOPROL XL) 50 MG EXTENDED RELEASE TABLET    Take 1 tablet by mouth daily    NITROGLYCERIN (NITROSTAT) 0.4 MG SL TABLET    up to max of 3 total doses. If no relief after 1 dose, call 911. SPIRONOLACTONE (ALDACTONE) 25 MG TABLET    TAKE ONE TABLET BY MOUTH DAILY    TORSEMIDE (DEMADEX) 20 MG TABLET    Take 1 tablet by mouth daily At 5pm. May take an extra dose for 1-2 days only for worsening shortness of breath         Review of Systems:  Review of Systems   Constitutional: Negative for chills and fever. HENT: Negative for congestion and sore throat. Respiratory: Negative for cough, shortness of breath and wheezing. Cardiovascular: Negative for chest pain. Gastrointestinal: Positive for abdominal pain and nausea. Negative for diarrhea and vomiting. Patient complains of right upper quadrant abdominal pain associate with nausea, no vomiting or diarrhea, reports normal bowel movement earlier this morning, no blood in stool. States the pain developed around 8 or 9:00 last night, has a known enlarged spleen. Genitourinary: Negative for difficulty urinating, dysuria, frequency and hematuria. Musculoskeletal: Negative for back pain. Skin: Negative for color change. Neurological: Negative for weakness, numbness and headaches. Positives and Pertinent negatives as per HPI. Except as noted above in the ROS, problem specific ROS was completed and is negative. Physical Exam:  Physical Exam  Vitals signs and nursing note reviewed.    Constitutional: 0.9 % sodium chloride bolus (1,000 mLs Intravenous New Bag 1/22/20 1159)   ondansetron (ZOFRAN) injection 4 mg (has no administration in time range)   morphine (PF) injection 4 mg (4 mg Intravenous Given 1/22/20 1156)   iopamidol (ISOVUE-370) 76 % injection 75 mL (75 mLs Intravenous Given 1/22/20 1220)     Patient complains of right upper quadrant abdominal pain associate with nausea, no vomiting or diarrhea, reports normal bowel movement earlier this morning, no blood in stool. States the pain developed around 8 or 9:00 last night, has a known enlarged spleen. .    After evaluation and examination the patient IV access, IV fluids, blood work, analysis, pain medicine, nausea medicine and a CT scan of the abdomen were ordered. CBC shows no acute sepsis or significant anemia. Metabolic panel shows chronic kidney with a creatinine of 1.4, GFR 50 and BUN of 23 however this is not a new finding when I trend this and compared to previous labs. Liver functions are normal.  Lipase is normal.  Urinalysis shows no acute infection. CT scan of the abdomen shows no acute inflammatory processes appreciated in the abdomen or pelvis. He does have a stable severe splenomegaly. He also has prostate enlargement however no additional acute findings. I do believe the patient could have some irritable bowel type syndrome and symptoms. However at this time of no concerns for acute surgical abdomen, no concerns for sepsis, no concerns for appendicitis, pancreatitis, diverticulitis or cholecystitis. Therefore, shared medical decision was made between the patient and myself only agreed the patient could be discharged home with outpatient follow-up. Patient was discharged home with prescription for Bentyl and Zofran, educated to take medicine as prescribed. Educated follow-up the PCP in 2 days for reevaluation, return to the ER for any worsening symptoms. The patient tolerated their visit well. I evaluated the patient.   The physician was available for consultation as needed. The patient and / or the family were informed of the results of any tests, a time was given to answer questions, a plan was proposed and they agreed with plan. Patient verbalized understanding of discharge instructions and the patient was discharged from the department stable condition. CLINICAL IMPRESSION:  1. Generalized abdominal cramping    2. Nausea without vomiting    3.  History of splenomegaly        DISPOSITION Decision To Discharge 01/22/2020 12:52:16 PM      PATIENT REFERRED TO:  Danisha Cunha  [address] [place] [address]  [phone]    Schedule an appointment as soon as possible for a visit in 2 days  Follow-up with your family doctor in 2 days for reevaluation    [facility] Emergency Department  [address] [id number] [phone]  Go to   If symptoms worsen      DISCHARGE MEDICATIONS:  New Prescriptions    DICYCLOMINE (BENTYL) 10 MG CAPSULE    Take 1 capsule by mouth every 6 hours as needed (cramps)    ONDANSETRON (ZOFRAN) 4 MG TABLET    Take 1 tablet by mouth every 8 hours as needed for Nausea       DISCONTINUED MEDICATIONS:  Discontinued Medications    No medications on file              (Please note the MDM and HPI sections of this note were completed with a voice recognition program.  Efforts were made to edit the dictations but occasionally words are mis-transcribed.)    Electronically signed, JACKI Qureshi CNP,           JACKI Qureshi CNP  01/22/20 7970

## 2020-02-17 ENCOUNTER — OFFICE VISIT (OUTPATIENT)
Dept: CARDIOLOGY CLINIC | Age: 75
End: 2020-02-17
Payer: MEDICARE

## 2020-02-17 VITALS
OXYGEN SATURATION: 96 % | HEIGHT: 69 IN | SYSTOLIC BLOOD PRESSURE: 138 MMHG | HEART RATE: 98 BPM | DIASTOLIC BLOOD PRESSURE: 76 MMHG | BODY MASS INDEX: 42.65 KG/M2 | WEIGHT: 288 LBS

## 2020-02-17 PROCEDURE — 1123F ACP DISCUSS/DSCN MKR DOCD: CPT | Performed by: INTERNAL MEDICINE

## 2020-02-17 PROCEDURE — 93000 ELECTROCARDIOGRAM COMPLETE: CPT | Performed by: INTERNAL MEDICINE

## 2020-02-17 PROCEDURE — 4040F PNEUMOC VAC/ADMIN/RCVD: CPT | Performed by: INTERNAL MEDICINE

## 2020-02-17 PROCEDURE — 1036F TOBACCO NON-USER: CPT | Performed by: INTERNAL MEDICINE

## 2020-02-17 PROCEDURE — 99213 OFFICE O/P EST LOW 20 MIN: CPT | Performed by: INTERNAL MEDICINE

## 2020-02-17 PROCEDURE — G8484 FLU IMMUNIZE NO ADMIN: HCPCS | Performed by: INTERNAL MEDICINE

## 2020-02-17 PROCEDURE — G8427 DOCREV CUR MEDS BY ELIG CLIN: HCPCS | Performed by: INTERNAL MEDICINE

## 2020-02-17 PROCEDURE — 3017F COLORECTAL CA SCREEN DOC REV: CPT | Performed by: INTERNAL MEDICINE

## 2020-02-17 PROCEDURE — G8417 CALC BMI ABV UP PARAM F/U: HCPCS | Performed by: INTERNAL MEDICINE

## 2020-02-17 RX ORDER — METOPROLOL SUCCINATE 100 MG/1
100 TABLET, EXTENDED RELEASE ORAL DAILY
Qty: 90 TABLET | Refills: 3 | Status: SHIPPED | OUTPATIENT
Start: 2020-02-17 | End: 2021-02-23

## 2020-02-17 NOTE — PROGRESS NOTES
[facility]  Cardiology Progress Note      Stacy Gallagher  1945, 69 y.o.      CC:  \"doing well. \"               Karoline Islas:      HPI:   This is a 69 y.o. male  with history of diabetes, hepatitis C and basal cell leukemia who came in with chest pain and was found to have CHF as well as large diagonal occlusion that was opened and stented in 2018. EF at that time was 35%. The patient is on optimal medical therapy including ACE inhibitor spironolactone beta-blocker and statins    Comes for routine follow up. Says he feels \"great. \" No new complaints. Taking all medication and tolerating. No new angina. No abnormal bruising or bleeding. Past Medical History:   Diagnosis Date    Anemia     Arthritis     Cancer ([facility] [address]     hairy cell leukemia    Colon polyps 2010    Diabetes mellitus ([facility] [address]     Hepatitis C     Obesity     Splenomegaly       Past Surgical History:   Procedure Laterality Date    BONE MARROW BIOPSY      COLONOSCOPY      LIVER BIOPSY      TOTAL KNEE ARTHROPLASTY Left 2017      No family history on file.    Social History     Tobacco Use    Smoking status: Former Smoker     Last attempt to quit: 1981     Years since quittin.0    Smokeless tobacco: Never Used   Substance Use Topics    Alcohol use: No     Alcohol/week: 0.0 standard drinks    Drug use: No     Allergies   Allergen Reactions    Oxycodone Rash     Pt has no problem with hydrocodone         Review of Systems -   Constitutional: Negative for weight gain/loss; malaise, fever  Respiratory: Negative for Asthma;  cough and hemoptysis  Cardiovascular: Negative for palpitations,dizziness   Gastrointestinal: Negative for abd.pain; constipation/diarrhea;    Genitourinary: Negative for stones; hematuria; frequency hesitancy  Integumentt: Negative for rash or pruritis  Hematologic/lymphatic: Negative for blood dyscrasia; leukemia/lymphoma  Musculoskeletal: Negative for Connective tissue disease  Neurological:  Negative for Seizure   Behavioral/Psych:Negative for Bipolar disorder, Schizophrenia; Dementia  Endocrine: negative for thyroid, parathyroid disease    Physical Examination:    /76   Pulse 98   Ht 5' 9\" (1.753 m)   Wt 288 lb (130.6 kg) Comment: too unsteady to remove shoes  SpO2 96%   BMI 42.53 kg/m²    HEENT:  Face: Atraumatic, Conjunctiva: Pink; non icteric,  Mucous Memb:  Moist, No thyromegaly or Lymphadenopathy  Respiratory:  Resp Assessment: normal, Resp Auscultation: clear  Cardiovascular: Auscultation: nl S1 & S2, Palpation:  Nl PMI;  No heaves or thrills, JVP:  normal  Abdomen: Soft, non-tender, Normal bowel sounds,  No organomegaly  Extremities: No Cyanosis or Clubbing  Neurological: Oriented to time, place, and person, Non-anxious  Psychiatric: Normal mood and affect  Skin: Warm and dry,  No rash seen     Outpatient Medications Marked as Taking for the 2/17/20 encounter (Office Visit) with Jerson Shelton MD   Medication Sig Dispense Refill    dicyclomine (BENTYL) 10 MG capsule Take 1 capsule by mouth every 6 hours as needed (cramps) 20 capsule 0    ondansetron (ZOFRAN) 4 MG tablet Take 1 tablet by mouth every 8 hours as needed for Nausea 20 tablet 0    spironolactone (ALDACTONE) 25 MG tablet TAKE ONE TABLET BY MOUTH DAILY 30 tablet 5    lisinopril (PRINIVIL;ZESTRIL) 20 MG tablet Take 1 tablet by mouth daily 90 tablet 3    atorvastatin (LIPITOR) 40 MG tablet Take 1 tablet by mouth nightly 90 tablet 3    metoprolol succinate (TOPROL XL) 50 MG extended release tablet Take 1 tablet by mouth daily 90 tablet 3    clopidogrel (PLAVIX) 75 MG tablet Take 1 tablet by mouth daily Loading dose of 300 mg ( 4 tablets) on day 1 only 34 tablet 5    torsemide (DEMADEX) 20 MG tablet Take 1 tablet by mouth daily At 5pm. May take an extra dose for 1-2 days only for worsening shortness of breath 30 tablet 5    aspirin 81 MG chewable tablet Take 1 tablet by mouth daily 30 tablet 3    nitroGLYCERIN (NITROSTAT) 0.4 MG SL tablet up to max of 3 total doses. If no relief after 1 dose, call 911. 25 tablet 3    metFORMIN (GLUCOPHAGE) 1000 MG tablet Take 0.5 tablets by mouth daily (with breakfast) 60 tablet 3    ferrous sulfate 325 (65 FE) MG tablet Take 325 mg by mouth daily (with breakfast). Labs:   Lab Results   Component Value Date    HDL 29 2019    HDL 25 2010    LDLCALC 75 2019    TRIG 52 2019         EK2019, Sinus Rhythm  20, sinus rhythm, rate 99    ECHO:2018  Normal LV size with global systolic dysfunction. EF 35%. Distal anterior  hypokinesis. The left atrium is severely dilated. Normal right ventricular size and function. Trivial Mitral and Tricuspid regurgitation. [facility] angiogram  & Intervention:2018  1.  There is branch vessel disease in this codominant circulation.  The arteries are large. 2.  The left main is free of disease. 3.  LAD is a large vessel, it reaches the apex.  D1: 80- 90% ostial lesion.  D2: subtotally occluded and is the culprit vessel. 4.  The left circumflex artery is free of disease.  It is a codominant vessel. 5.  The codominant right also has no major obstructive disease.     Successful PCI and stenting of subtotally occluded diagonal  branch.  Lesion reduced from 100% to 0%.  2.5 x 23-mm JOHNSON stent was  used.     RACHELLE Pastor initially accessed the groin to the right femoral.  However, we  probably entered dissection plane as we could not advance the wire.  We  therefore accessed the left femoral artery. ASSESSMENT AND PLAN:       STEMI 18  High lateral infarct secondary to occlusion of the second diagonal branch. This was ballooned and stented using 2.5×23 mm JOHNSON stent. First diagonal also has 80% ostial lesion.   Other major vessels are free of disease  EF 35%    No new angina; therefore may stop Clopidogrel  Continue ASA and Statin     Cardiomyopathy  I think the LV dysfunction is out of proportion to CAD; therefore probably non ischemic  EF 35%, with high LVEDP  Appears compensated on exam  Continue current medical therapy; increase BB to 100 mg daily   May take additional Torsemide for 1-2 days as needed for worsening breathing or weight gain of 2-3 pounds in a day or 5 pounds in a week  Reinforced fluid and sodium restriction     Hematologic Disorder  Basal cell leukemia  Managed by Dr. Norris Noonan  Follow up in 6 months        Thank you very much for allowing me to participate in the care of your patient. Please do not hesitate to contact me if you have any questions.       Sincerely,    Porfirio Crisostomo M.D  [facility], [address] [place] [address], [place] [address]  Ph: [phone]  Fax: [phone]

## 2020-05-08 RX ORDER — ATORVASTATIN CALCIUM 40 MG/1
40 TABLET, FILM COATED ORAL NIGHTLY
Qty: 90 TABLET | Refills: 3 | Status: SHIPPED | OUTPATIENT
Start: 2020-05-08 | End: 2021-05-25

## 2020-05-27 NOTE — TELEPHONE ENCOUNTER
Last O/V: 2/17/2020  Next O/V: 8/19/2020    Labs: CBC CMP 1/22/2020    Please sign in Dr. Nicholas Dakin absence

## 2020-05-29 RX ORDER — LISINOPRIL 20 MG/1
20 TABLET ORAL DAILY
Qty: 90 TABLET | Refills: 3 | Status: ON HOLD
Start: 2020-05-29 | End: 2021-05-08 | Stop reason: HOSPADM

## 2020-08-19 ENCOUNTER — OFFICE VISIT (OUTPATIENT)
Dept: CARDIOLOGY CLINIC | Age: 75
End: 2020-08-19
Payer: MEDICARE

## 2020-08-19 VITALS
SYSTOLIC BLOOD PRESSURE: 138 MMHG | TEMPERATURE: 99.6 F | OXYGEN SATURATION: 95 % | HEART RATE: 95 BPM | HEIGHT: 69 IN | DIASTOLIC BLOOD PRESSURE: 82 MMHG | WEIGHT: 274 LBS | BODY MASS INDEX: 40.58 KG/M2

## 2020-08-19 PROCEDURE — G8417 CALC BMI ABV UP PARAM F/U: HCPCS | Performed by: INTERNAL MEDICINE

## 2020-08-19 PROCEDURE — 4040F PNEUMOC VAC/ADMIN/RCVD: CPT | Performed by: INTERNAL MEDICINE

## 2020-08-19 PROCEDURE — 3017F COLORECTAL CA SCREEN DOC REV: CPT | Performed by: INTERNAL MEDICINE

## 2020-08-19 PROCEDURE — 99214 OFFICE O/P EST MOD 30 MIN: CPT | Performed by: INTERNAL MEDICINE

## 2020-08-19 PROCEDURE — G8427 DOCREV CUR MEDS BY ELIG CLIN: HCPCS | Performed by: INTERNAL MEDICINE

## 2020-08-19 PROCEDURE — 1036F TOBACCO NON-USER: CPT | Performed by: INTERNAL MEDICINE

## 2020-08-19 PROCEDURE — 1123F ACP DISCUSS/DSCN MKR DOCD: CPT | Performed by: INTERNAL MEDICINE

## 2020-08-19 NOTE — PATIENT INSTRUCTIONS
trans fat  · Read food labels, and try to avoid saturated and trans fats. They increase your risk of heart disease. · Use olive or canola oil when you cook. · Bake, broil, grill, or steam foods instead of frying them. · Choose lean meats instead of high-fat meats such as hot dogs and sausages. Cut off all visible fat when you prepare meat. · Eat fish, skinless poultry, and meat alternatives such as soy products instead of high-fat meats. Soy products, such as tofu, may be especially good for your heart. · Choose low-fat or fat-free milk and dairy products. Eat foods high in fiber  · Eat a variety of grain products every day. Include whole-grain foods that have lots of fiber and nutrients. Examples of whole-grain foods include oats, whole wheat bread, and brown rice. · Buy whole-grain breads and cereals, instead of white bread or pastries. Limit salt and sodium  · Limit how much salt and sodium you eat to help lower your blood pressure. · Taste food before you salt it. Add only a little salt when you think you need it. With time, your taste buds will adjust to less salt. · Eat fewer snack items, fast foods, and other high-salt, processed foods. Check food labels for the amount of sodium in packaged foods. · Choose low-sodium versions of canned goods (such as soups, vegetables, and beans). Limit sugar  · Limit drinks and foods with added sugar. These include candy, desserts, and soda pop. Limit alcohol  · Limit alcohol to no more than 2 drinks a day for men and 1 drink a day for women. Too much alcohol can cause health problems. When should you call for help? Watch closely for changes in your health, and be sure to contact your doctor if:  · You would like help planning heart-healthy meals. Where can you learn more? Go to [email or web address] and sign in to your [email or web address] account.  Enter V137 in the [employer] box to learn more about \"Heart-Healthy Diet: Care Instructions. \"     If you do not have an account, please click on the \"Sign Up Now\" link. Current as of: August 22, 2019               Content Version: 12.5  © 7007-7847 Healthwise, Incorporated. Care instructions adapted under license by [facility] ([facility]). If you have questions about a medical condition or this instruction, always ask your healthcare professional. [address] any warranty or liability for your use of this information.

## 2020-08-19 NOTE — PROGRESS NOTES
[facility]  Cardiology Progress Note      Inez Townsend  1945, 77 y.o.      CC: \" my knee hurts. \"             Demian Gonzáles:      HPI:   This is a 77 y.o. male  with history of diabetes, hepatitis C and basal cell leukemia who came in with chest pain and was found to have CHF as well as large diagonal occlusion that was opened and stented in 2018. EF at that time was 35%. The patient is on optimal medical therapy including ACE inhibitor spironolactone beta-blocker and statins. Patient comes for routine management of CAD. He has no new cardiac complaints. Says he has some knee pain and is in need of a knee replacement. He uses a cane for ambulation assistance. Today, patient denies any chest pain, pressure, tightness, nausea, vomiting, diaphoresis, SOB/VILLARREAL, palpitations, heart racing, dizziness/lightheadedness, orthopnea, PND, LE edema or syncope. Past Medical History:   Diagnosis Date    Anemia     Arthritis     Cancer ([facility] [address]     hairy cell leukemia    Colon polyps 2010    Diabetes mellitus ([facility] [address]     Hepatitis C     Obesity     Splenomegaly       Past Surgical History:   Procedure Laterality Date    BONE MARROW BIOPSY      COLONOSCOPY      LIVER BIOPSY      TOTAL KNEE ARTHROPLASTY Left 2017      History reviewed. No pertinent family history.    Social History     Tobacco Use    Smoking status: Former Smoker     Last attempt to quit: 1981     Years since quittin.5    Smokeless tobacco: Never Used   Substance Use Topics    Alcohol use: No     Alcohol/week: 0.0 standard drinks    Drug use: No     Allergies   Allergen Reactions    Oxycodone Rash     Pt has no problem with hydrocodone         Review of Systems -   Constitutional: Negative for weight gain/loss; malaise, fever  Respiratory: Negative for Asthma;  cough and hemoptysis  Cardiovascular: Negative for palpitations,dizziness   Gastrointestinal: Negative for abd.pain; constipation/diarrhea;    Genitourinary: Negative for stones; hematuria; frequency hesitancy  Integumentt: Negative for rash or pruritis  Hematologic/lymphatic: Negative for blood dyscrasia; leukemia/lymphoma  Musculoskeletal: Negative for Connective tissue disease  Neurological:  Negative for Seizure   Behavioral/Psych:Negative for Bipolar disorder, Schizophrenia; Dementia  Endocrine: negative for thyroid, parathyroid disease    Physical Examination:    /82 (Site: Left Upper Arm, Position: Sitting)   Pulse 95   Temp 99.6 °F (37.6 °C)   Ht 5' 9\" (1.753 m)   Wt 274 lb (124.3 kg)   SpO2 95%   BMI 40.46 kg/m²    HEENT:  Face: Atraumatic, Conjunctiva: Pink; non icteric,  Mucous Memb:  Moist, No thyromegaly or Lymphadenopathy  Respiratory:  Resp Assessment: normal, Resp Auscultation: clear  Cardiovascular: Auscultation: nl S1 & S2, Palpation:  Nl PMI;  No heaves or thrills, JVP:  normal  Abdomen: Soft, non-tender, Normal bowel sounds,  No organomegaly  Extremities: No Cyanosis or Clubbing  Neurological: Oriented to time, place, and person, Non-anxious  Psychiatric: Normal mood and affect  Skin: Warm and dry,  No rash seen     Outpatient Medications Marked as Taking for the 8/19/20 encounter (Office Visit) with Brooke Edouard MD   Medication Sig Dispense Refill    lisinopril (PRINIVIL;ZESTRIL) 20 MG tablet Take 1 tablet by mouth daily 90 tablet 3    atorvastatin (LIPITOR) 40 MG tablet Take 1 tablet by mouth nightly 90 tablet 3    metoprolol succinate (TOPROL XL) 100 MG extended release tablet Take 1 tablet by mouth daily 90 tablet 3    spironolactone (ALDACTONE) 25 MG tablet TAKE ONE TABLET BY MOUTH DAILY 30 tablet 5    torsemide (DEMADEX) 20 MG tablet Take 1 tablet by mouth daily At 5pm. May take an extra dose for 1-2 days only for worsening shortness of breath 30 tablet 5    aspirin 81 MG chewable tablet Take 1 tablet by mouth daily 30 tablet 3    nitroGLYCERIN (NITROSTAT) 0.4 MG SL tablet up to max of 3 total doses. If no relief after 1 dose, call 911. 25 tablet 3    metFORMIN (GLUCOPHAGE) 1000 MG tablet Take 0.5 tablets by mouth daily (with breakfast) 60 tablet 3    ferrous sulfate 325 (65 FE) MG tablet Take 325 mg by mouth daily (with breakfast). Labs:   Lab Results   Component Value Date    HDL 29 2019    HDL 25 2010    LDLCALC 75 2019    TRIG 52 2019         EK2019, Sinus Rhythm  20, sinus rhythm, rate 99    ECHO: 2018  Normal LV size with global systolic dysfunction. EF 35%. Distal anterior  hypokinesis. The left atrium is severely dilated. Normal right ventricular size and function. Trivial Mitral and Tricuspid regurgitation. [facility] angiogram  & Intervention: 2018  1. Magda El is branch vessel disease in this codominant circulation.  The arteries are large. 2.  The left main is free of disease. 3.  LAD is a large vessel, it reaches the apex.  D1: 80- 90% ostial lesion.  D2: subtotally occluded and is the culprit vessel. 4.  The left circumflex artery is free of disease.  It is a codominant vessel. 5.  The codominant right also has no major obstructive disease.     Successful PCI and stenting of subtotally occluded diagonal  branch.  Lesion reduced from 100% to 0%.  2.5 x 23-mm JOHNSON stent was  used.     RACHELLE Rivera initially accessed the groin to the right femoral.  However, we  probably entered dissection plane as we could not advance the wire.  We  therefore accessed the left femoral artery. ASSESSMENT AND PLAN:      Knee pain  Will refer to Dr. Ponce Rosas     STEMI 18  High lateral infarct secondary to occlusion of the second diagonal branch. This was ballooned and stented using 2.5×23 mm JOHNSON stent. First diagonal also has 80% ostial lesion.   Other major vessels are free of disease  EF 35%    No new angina; therefore may stop Clopidogrel  Continue ASA and Statin     Cardiomyopathy  I think the LV dysfunction is out of proportion to CAD; therefore probably non ischemic  EF 35%, with high LVEDP  Appears compensated on exam  Continue current medical therapy; increase BB to 100 mg daily   May take additional Torsemide for 1-2 days as needed for worsening breathing or weight gain of 2-3 pounds in a day or 5 pounds in a week  Reinforced fluid and sodium restriction     Hematologic Disorder  Basal cell leukemia  Managed by Dr. Spencer Veloz  Follow up in 1 year       Thank you very much for allowing me to participate in the care of your patient. Please do not hesitate to contact me if you have any questions. Sincerely,    Robbie Cai M.D  [facility], [address] [place] [address]  Ph: [phone]  Fax: [phone]      This note was scribed in the presence of Dr. Robbie Cai MD by Lashanda Friedman    Physician Attestation:  The scribes documentation has been prepared under my direction and personally reviewed by me in its entirety. I confirm that the note above accurately reflects all work, treatment, procedures, and medical decision making performed by me.

## 2020-08-24 ENCOUNTER — OFFICE VISIT (OUTPATIENT)
Dept: ORTHOPEDIC SURGERY | Age: 75
End: 2020-08-24
Payer: MEDICARE

## 2020-08-24 VITALS — BODY MASS INDEX: 39.69 KG/M2 | TEMPERATURE: 97 F | WEIGHT: 268 LBS | HEIGHT: 69 IN

## 2020-08-24 PROCEDURE — 99203 OFFICE O/P NEW LOW 30 MIN: CPT | Performed by: ORTHOPAEDIC SURGERY

## 2020-08-24 NOTE — PROGRESS NOTES
This dictation was done with Dragon dictation and may contain mechanical errors related to translation. Temperature 97 °F (36.1 °C), height 5' 9\" (1.753 m), weight 268 lb (121.6 kg).

## 2020-08-24 NOTE — LETTER
[facility]  Surgery Scheduling Form:  [facility]    DEMOGRAPHICS:                                                                                                                Patient Name:  Shailesh Rashid  Patient :  1945   Patient SS#:      Patient Phone:  [phone] (home)                            Patient Address:  [place] [id number]    PCP:  [address]    Payor/Plan Subscr  Sex Relation Sub. Ins. ID Effective Group Num   1. R Junior Dewey 39 1945 Male  [id number] 1[facility]1/15 [id number]                                    [id number]     DIAGNOSIS & PROCEDURE:                                                                                              Diagnosis:     Right arthritis knee     Operation:  Right Total Knee Replacement robotic assisted  Location:  [facility]  Surgeon:  Angi Li MD    SCHEDULING INFORMATION:                                                                                         .  Surgeon's Scheduling Instruction:  elective    Requested Date:    OR Time:   Patient Arrival Time:      OR TIME REQUIRED  :  80 MIN  Anesthesia:  Choice  Equipment: Angela ASIA, advanced                COVID:    Mini C-Arm:  No   Standard C-Arm:  No  Status:  Same day admit  PAT Required:  Yes  Comments: NO femoral nerve block   ALLERGIES:Oxycodone                     Jose Stringer MD      20 9:55 AM  BILLING INFORMATION:                                                                                                     Procedure:       CPT Code Modifier    With Emre Patricio, [address]                H&P AT:       PCP  XX                      URGENT CARE                    Carter Altman   RIGHT OTAL KNEE REPLACEMENT   1945     PHYSICIANS ORDERS  HEIGHT:   5'9           WEIGHT:   268      ALLERGIES:Oxycodone       SURG                     JET __________________________________________________________________  PRE-OP ORDERS:  ? CBC WITH DIFFERENTIAL                                                ? TYPE AND SCREEN                                                            ? HgB A1C                                                                               ? EKG                                                                                        ? NASAL CULUTRE MRSA  ? UAR/if positive repeat UAR on admission  ? BMP           ALBUMIN AND PREALBUMIN           VITAMIN D LEVELS  ? COAG PROFILE  ? SED RATE  ? PT/OT EVAL AND TEACHING  ? INSTRUCT PT TO STOP ALL NSAIDS, ASPIRIN, BLOOD THINNERS 7 DAYS PRIOR SURGERY  DAY OF SURGERY  ? CEFAZOLIN 2 GM IVPB; IF PATIENT WEIGHS > 80 KG AND SERUM CREATININE <2.5 mg/dl, GIVE 2 GM DOSE WITHIN 1 HOUR OF INCISION. ? IF THE PRE-OP NASAL CULTURE FOR MRSA WAS POSITIVE:   REPEAT NASAL SWAB ON ADMISSION AND ADMINISTER VANCOMYCIN 1 GM IVPB, REDUCE THE DOSE OF VANCOMYCIN  MG IVPB IF PT < 55 KG OR SERUM CREATININE > 2mg/dl; also to get Cefazolin 2 GM or wt based  ? All patients will receive preop Cefazolin 2 GM or wt based   ? APPLY KNEE HIGH ANTI-EMBOLIC AND PNEUMO-BOOTS TO UNOPERATIVE  LEG  ? CELEBREX  200 MG  ORALLY  DAY OF SURGERY  ? ROXICODONE 10MG  ORALLY DAY OF SURGERY  OTHER ORDERS:_______________________________________________________PHYSICIAN SIGNATURE: __ 8/24/20                                                                                                       9:55 AM  ________________________DATE:                          Supplemental Confidentiality & Payment Agreement & Supplemental HIPAA Notice for Shared Medical Appointments        During shared medical appointments, you will hear about other participants health issues and personal information.   As a matter of trust, it is your duty to keep everything you hear confidential.  Nothing that identifies a participant in any way (including job, ethnicity, Yazidi, etc.) can be shared outside of this group setting. I have read and agree to the following statements:        · I agree to meet with a group of patients and my doctor. I understand that I have the choice to be seen by my physician in this group or individually and that I may leave the group visit anytime I feel uncomfortable. · I understand that discussions will occur regarding individual identifiable health information during the shared medical appointment and I will maintain the confidentiality of [facility] health information or other information heard during the appointment. · I am committed to maintaining this confidentiality even if I am no longer participating in shared medical appointments. · I understand that the information I share with the physician and staff will be heard by the other participants and there is a possibility that the information disclosed in the shared medical appointment may be re-disclosed by other participants. I have been notified of this potential disclosure and I voluntarily agree to participate in the shared medical appointment. · I know that I dont have to share any personal information with the group or health care providers unless, I choose to do so. · Like any doctors appointment, I agree to be responsible for the bill and / or co-payment associated with this physician appointment. Shared Medical Appointment              THIS SUPPLEMENTAL NOTICE SUPPLEMENTS AND DOES NOT REPLACE THE [facility] CONSENT, PATIENT RESPONSIBILITY AND NOTICE OF PRIVACY PRACTICE. Inez Townsend    1945        Patients Signature: ____________________________________________________         DATE: ____/____/___      Remington Hoskinses / Support Persons Signature (if applicable) _________________________     DATE: __/__/__    **Each person will be asked to sign this commitment before each Shared Medical Appointment. Thank You ! SURGERY SCHEDULING TIMES                                                                                                                                                      Lorrie Gonzalez                                                                                       1945                                                                           SURGERY DATE: ___/___/___                                                                      SURGERY TIME:  ___:___ AM/PM                                                                      ARRIVAL TIME:   ___:___  AM/PM                                                                                                                        **PLEASE REPORT TO THE                                                       INFORMATION DESK IN THE MAIN LOBBY                                                          OF THE HOSPITAL.         [facility]                           JET INFO     PT NAME:  Lorrie Gonzalez              Patient Phone:  [phone] ([place])                                           1945    PCP:  PCP:  King Jade                APPT DATE:  ___/___/___      DATE:  20        H&P __________    LABS: _________                      NEEDED:  __________    EKG:   _________                     NEEDED:  __________      JET DATE:   _______/_______      SURG DATE:   ________/________

## 2020-08-29 NOTE — PROGRESS NOTES
Patient is a 67-year-old male presents today for evaluation treatment of right knee pain. Patient states he has had knee pain for some time and is status post history of left total knee arthroplasty performed by another doctor in 2017. He now presents with right knee pain rating it at 7 out of 10 at its worst and also now has difficulty going up and down steps and has some mild instability symptoms. He has been treated conservatively with anti-inflammatories physical therapy and both Visco supplementation and cortisone injections. He complains of significant loss of range of motion and deformity of the knee joint. Has no history of injury or surgery to the right knee. Patient is diabetic and does have a history of cardiac stents which he is on aspirin for his blood thinner. Patient states he does not smoke take narcotics have back problems or any hormone replacement therapy. Patient states he has no dental issues. Patient is here to discuss possible total knee arthroplasty. Patient Active Problem List   Diagnosis    Spleen enlargement    Hepatitis C    Colon polyps    Iron deficiency anemia    [facility], [facility]    Diabetes mellitus type 2 in obese ([facility]    Essential hypertension    Acute blood loss anemia    Acute pain of left knee    Primary osteoarthritis of left knee    STEMI (ST elevation myocardial infarction) ([facility]     Prior to Visit Medications    Medication Sig Taking?  Authorizing Provider   lisinopril (PRINIVIL;ZESTRIL) 20 MG tablet Take 1 tablet by mouth daily  Bird Pro MD   atorvastatin (LIPITOR) 40 MG tablet Take 1 tablet by mouth nightly  Natalie Hughes MD   metoprolol succinate (TOPROL XL) 100 MG extended release tablet Take 1 tablet by mouth daily  Natalie Hughes MD   spironolactone (ALDACTONE) 25 MG tablet TAKE ONE TABLET BY MOUTH DAILY  Natalie Hughes MD   torsemide (DEMADEX) 20 MG tablet Take 1 tablet by mouth daily At 5pm. May take an extra dose for 1-2 days only for worsening shortness of breath  Elena Abarca MD   aspirin 81 MG chewable tablet Take 1 tablet by mouth daily  Elena Abarca MD   nitroGLYCERIN (NITROSTAT) 0.4 MG SL tablet up to max of 3 total doses. If no relief after 1 dose, call 911. Elena Abarca MD   metFORMIN (GLUCOPHAGE) 1000 MG tablet Take 0.5 tablets by mouth daily (with breakfast)  Elena Abarca MD   ferrous sulfate 325 (65 FE) MG tablet Take 325 mg by mouth daily (with breakfast). Historical Provider, MD     Physical examination 80-year-old male oriented x3 temperature is 97.0. The patient does carry a high class II morbid obesity BMI of 39 point examination of his back shows decreased range of motion but no specific pain tenderness or instability. Motor exam to the right lower extremity shows quadriceps hamstrings hip abductors and abductors foot plantar and dorsiflexors are intact 4-5 over 5. Sensation and perfusion are intact to the right foot and ankle. There is no numbness or tingling noted in the right lower extremity. Tendon reflexes are 0 to knee and 0 at the ankle of the right lower extremity. No other obvious cutaneous lesions lymphedema or cellulitic processes are noted in the right lower extremity. Examination of the right knee shows an obvious effusion varus deformity loss of full extension by 10 to 15 degrees with flexion to about 105 degrees with pain. No obvious signs of instability or deep sepsis are noted in the right knee. X-rays obtained AP lateral and patellofemoral view of the right knee shows end-stage advanced medial varus bone-on-bone degenerative osteoarthritis. Complete loss of the medial tibiofemoral space lateral tibial subluxation and distal lateral femoral condyle osteophytes are noted. Moderately severe patellofemoral degenerative joint disease is also noted. Patient does have some interosseous bony changes within the metaphyseal diaphyseal region of the right proximal tibia.   This does not appear to be anything a carcinomatous or concerning finding. No other obvious fractures tumors or dislocations are noted on these x-rays. Impression 60-year-old male stable status post left total knee arthroplasty now with right knee symptoms that has not been treated with anti-inflammatories physical therapy and both Visco supplementation and cortisone. None of these appear to be working he wants to move towards knee arthroplasty. We had a 35 minute face-to-face discussion of which greater than 50% of the time was spent in counseling with this patient concerning right total knee arthroplasty it's preoperative evaluation and its postoperative pain management and follow-up. We went over the surgical procedure risks and complications including bleeding, infection,  neurovascular injury, decreased range of motion, persistent pain, instability, DVT, pulmonary embolism, leg length inequality, change in mental status, and need for further surgical procedures. The patient understands this and we have answered all of their questions and concerns. The patient was counseled at length about the risks of adam Covid-19 during their perioperative period and any recovery window from their procedure. The patient was made aware that adam Covid-19  may worsen their prognosis for recovering from their procedure  and lend to a higher morbidity and/or mortality risk. All material risks, benefits, and reasonable alternatives including postponing the procedure were discussed. The patient does wish to proceed with the procedure at this time.

## 2020-09-02 ENCOUNTER — HOSPITAL ENCOUNTER (OUTPATIENT)
Dept: PHYSICAL THERAPY | Age: 75
Setting detail: THERAPIES SERIES
Discharge: HOME | End: 2020-09-02
Payer: MEDICARE

## 2020-09-02 PROCEDURE — 97116 GAIT TRAINING THERAPY: CPT

## 2020-09-02 PROCEDURE — 97110 THERAPEUTIC EXERCISES: CPT

## 2020-09-02 PROCEDURE — 97161 PT EVAL LOW COMPLEX 20 MIN: CPT

## 2020-09-02 NOTE — PROGRESS NOTES
TOTAL JOINT - PREHAB ASSESSMENT FORM    Date:  2020    Patient Name:  Luis Eduardo Anand    :  1945  SYLVIA:[id number]    Restrictions/Precautions: Position Activity Restriction  Other position/activity restrictions: minimal fall risk, none reported over last 1 year    Pertinent Medical History: Additional Pertinent Hx: L TKA     Medical/Treatment Diagnosis Information:  · Diagnosis: Primary osteoarthritis of right knee (M17.11  ·  Gait and mobility dysfunction due to right knee OA    Insurance/Certification information:  PT Insurance Information: [facility] Medicare  Physician Information:  Referring Practitioner: Dr LOPEZ [facility]:    [] Total Hip Replacement [] Right  [] Left  DOS:   [x] Not yet scheduled  [x] Total Knee Replacement [x] Right  [] Left    [x] Prehab Eval  [] Prehab D/C  [] Post - OP Visit             Weeks from sx [] Post-op D/C    SUBJECTIVE:    Chart Reviewed: Yes     Referral Date : 20     Subjective  Subjective: c/o constant knee pain 5-10, increaswed with activity, decreased with ice, disturbed sllep due to knee pain on occasion       DME ASSESSMENT:   Current available equipment:    [] Std. Margart Nba       [] Rolling walker      [] 4 wheeled walker     [] Eluterio Pepper     [x] Straight cane     [] Crutches   [x] Reacher            [] Sock Aid              [x] Shower chair            [] Leg          [x] Long handle shoe horn   [] Other:     Equipment needed at discharge from hospital:   [] Std. Margart Nba       [x] Rolling walker      [] 4 wheeled walker     [] Eluterio Pepper     [] Straight cane     [] Crutches   [] Reacher            [] Sock Aid              [] Shower chair            [] Leg          [] Long handle shoe horn   [] Other:     SOCIAL ASSESSMENT:  1. Will you live with someone who can care for you after surgery? [] Yes  [x] No  2. Where is the bathroom located in your post-surgery place of residence? [x] 1st Floor [] 2nd Floor  3.  Where is the bedroom located in your post-surgery place of residence? [x] 1st Floor [] 2nd Floor  4. Do you use community supports (home help, meals-on-wheels, district nurse)? [x] None or 1 per week  [] 2 or more per week  5. How many stairs will you have to climb to get in to your place of residence? [] Less than 5  [x] More than 5  6. Have you had a fall in the past year? [] Yes  [x] No     Notes:                                                                                  Available PT Visits:      BMI:    Height 5\"10\"   Weight 275       FUNCTIONAL ASSESSMENT:   1. Do you use ambulatory aids? [] None [x] Single Point Cane  [] Crutches/Walker/Wheelchair  2. How far on average can you walk? [] 2 Blocks or more  [x] 1-2 Blocks  [] House bound most of the time  3. What is your physical activity level? [] Highly Active [] Active  [x] Somewhat active  [] Sedentary     Knee AROM (degrees) R L   Flexion (in supine) 98 105   Extension (use \"-\" to denote deficit) (long sitting, resting) \"12\" \"3\"     MMT (lbs) R L   Knee Extension (peak in seated) 58 81   Hip Abduction (peak in side lying) 86 71     Functional Testing (shoes on) Results   Timed Up and Go (TUG)                  (rounded seconds) 24   30 Second Sit-Stand Test                  (completed repetitions) 6   6 Minute Walk Test  (meters) 227     Standing Balance  (shoes on, rounded to the nearest seconds, 10 second max)     1. Stand with your feet side by side:   Time:           (10sec)  2. Place the instep of one foot so it      Is touching the big toe of the surgical  Time:      Foot (surgical foot in back)     (10sec)     3. Place surgical foot behind so the toes  Time:       Are touching the heal of the other foot. (8 sec)    4.  Stand on surgical foot   Time:          (1 sec)       EXPECTED DISCHARGE DISPOSITION:                      [] Home no PT  [] Home w/ OP PT                                               If Discharge Disposition is to a facility, please indicate reason     Patient lives alone  [] [facility]                               [x] Lack of home support                   [] Medical Comorbidities  [x] SNF/Inpatient Rehab                                          [x] Transportation                               [] Other          ASSESSMENT:     Conditions Requiring Skilled Therapeutic Intervention  Assessment: PLOF pt is independendt  Decision Making:      Goals: The patient will demonstrate proper technique with a home knee pre-hab exercise program  MET  The patient will demonstrate safe and proper technique with rolling walker assisted gait including sit to stand transfers  MET  The patient will demonstrate safe and proper technique ascending/ descending stairs one step at a time protecting LE scheduled for surgery.   MET  Plan:    DC to home pre hab exercises     Therapy Time   Individual   Time In   3:45   Time Out  4:45   Minutes  60        Signature:  Margarito Arreola PT

## 2020-09-02 NOTE — FLOWSHEET NOTE
Physical Therapy Daily Treatment Note  Date:  2020    Patient Name:  Morgan Leal    :  1945  MRN: [id number]    Restrictions/Precautions:  Position Activity Restriction  Other position/activity restrictions: minimal fall risk, none reported over last 1 year  Pertinent Medical History: Additional Pertinent Hx: L TKA     Medical/Treatment Diagnosis Information:  · Diagnosis: Primary osteoarthritis of right knee (M17.11  ·   Gait and mobility dysfunction due to right knee OA    Insurance/Certification information:  PT Insurance Information: [facility] Medicare  Physician Information:  Referring Practitioner: Dr Ellene Eisenmenger of care signed (Y/N):  Routed 20     Visit# / total visits:  1  Pain level: 5/10     Functional Outcomes Measure:  Test:   Score:    Progress Note: []  Yes  [x]  No  Next due by: Visit #10      History of Injury:h/o right knee pain for several years    Subjective:  c/o constant knee pain 5-9/10, increaswed with activity, decreased with ice, disturbed sllep due to knee pain on occasion     Objective:   Observation:    Test measurements:      Exercises:Knee Pre-Hab home exercises  Exercise/Equipment Resistance/Repetitions Other comments   Quad sets 5\" 20 x    Seated hams stretch  30\" x 4    Knee flexion stretch  30\" x 4    Supine SLR 15 x    LAQ's 20 x    Calf towel stretch  30\" x 4    Ankle pumps 30 x    Chair squats 15 x                                     Other Therapeutic Activities:  Gait training with a rolling walker on level surfaces,, Stair training with RLE as operative leg ( up with good/ down with bad) one step at a time. Pt demonstrated safe and proper technique. With each. Home Exercise Program:    The patient demonstrated good tolerance to and understanding of the HEP. Written instructions have been issued. Manual Treatments:     Modalities:     Progression Towards Functional goals:  [x] Patient is progressing as expected towards functional goals listed. [] Progression is slowed due to complexities listed. [] Progression has been slowed due to co-morbidities. [] Plan just implemented, too soon to assess goals progression  [] Other:    Charges: Therapeutic Exercise:  [] (02766) Provided verbal/tactile cueing for activities to restore or maintain strength, flexibility, endurance, ROM for improvements with self-care, mobility, lifting and ambulation. Neuromuscular Re-Education  [] (27315) Provided verbal/tactile cueing for activities to restore or maintain balance, coordination, kinesthetic sense, posture, motor skill, proprioception for self-care, mobility, lifting, and ambulation. Therapeutic Activities:    [] (62152) Provided verbal/tactile cueing to address functional limitations related to loss of mobility, strength, balance, and coordination. Gait Training:  [] (73569) Provided training and instruction to the patient for proper postural muscle recruitment and positioning with ambulation re-education     Home Exercise Program:    [] (13699) Reviewed/Progressed HEP activities related to strengthening, flexibility, endurance, ROM for functional self-care, mobility, lifting and ambulation   [] (40076) Reviewed/Progressed HEP activities related to improving balance, coordination, kinesthetic sense, posture, motor skill, proprioception for self-care, mobility, lifting, and ambulation      Manual Treatments:  MFR / STM / Oscillations-Mobs:  G-I, II, III, IV / Manipulation / MLD  [] (83553) Provided manual therapy to mobilize  soft tissue/joints/fluid for the purpose of modulating pain, promoting relaxation, increasing ROM, reducing/eliminating soft tissue swelling/inflammation/restriction, improving soft tissue extensibility and allowing for proper ROM for normal function with self- care, mobility, lifting and ambulation.         Timed Code Treatment Minutes: 35   Total Treatment Minutes: 60     [x] EVAL (LOW) 24857   [] EVAL (MOD) 96747   [] EVAL (HIGH) 71383   [] RE-EVAL   [x] TE (52623) x     [] Aquatic (48534) x  [] NMR (50559)   x  [] Aquatic Group (16239) x  [] Manual (55973) x    [] Ultrasound (88538) x  [] TA (27461) x  [] Mech Traction (60485)  [] Ionto (96763)           [] ES (un) (67666):   [] Vasopump (55294) [x] Other:Gait      Assessment  [x] Patient tolerated treatment well [] Patient limited by fatigue  [] Patient limited by pain  [] Patient limited by other medical complications  [] Other:     Prognosis: [x] Good [] Fair  [] Poor    Goals: The patient will demonstrate proper technique with a home knee pre-hab exercise program  MET  The patient will demonstrate safe and proper technique with rolling walker assisted gait including sit to stand transfers  MET  The patient will demonstrate safe and proper technique ascending/ descending stairs one step at a time protecting LE scheduled for surgery. MET           Patient Requires Follow-up: [] Yes  [x] No    Plan:   [] Continue per plan of care [] Alter current plan (see comments)  [] Plan of care initiated [] Hold pending MD visit [x] Discharge  Note: If patient does not return for scheduled/ recommended follow up visits, this note will serve as a discharge from care along with most recent update on progress.     Plan for Next Session:      Electronically signed by:  Martell Lay PT

## 2020-09-02 NOTE — PLAN OF CARE
Outpatient Physical Therapy  [] [facility]    Phone: [phone]   Fax: [phone]   [x] [facility]  Phone: [phone]              Fax: [phone]  [] [facility]   Phone: [phone]   Fax: [phone]     To: Referring Practitioner: Dr James Odonnell      Patient: Laura Mendoza   : 1945   MRN: [id number]  Evaluation Date: 2020      Diagnosis Information:  · Diagnosis: Primary osteoarthritis of right knee (M17.11   ·   Gait and mobility dysfunction due to right knee OA    Physical Therapy Certification/Re-Certification Form  Dear Shira Homans  The following patient has been evaluated for physical therapy services and for therapy to continue, Medicare requires monthly physician review of the treatment plan. Please review the attached evaluation and/or summary of the patient's plan of care, and verify that you agree therapy should continue by signing the attached document and sending it back to our office. Plan of Care/Treatment to date: Knee Pre hab   [x] Therapeutic Exercise    [] Modalities:  [x] Therapeutic Activity     [] Ultrasound  [] Electrical Stimulation  [x] Gait Training      [] Cervical Traction [] Lumbar Traction  [x] Neuromuscular Re-education    [] Cold/hotpack [] Iontophoresis   [x] Instruction in HEP     Other:  [x] Manual Therapy      []             [] Aquatic Therapy      []           ? Frequency/Duration:  # Days per week: [x] 1 day # Weeks: [x] 1 week [] 5 weeks     [] 2 days? [x] 2 weeks [] 6 weeks     [] 3 days   [] 3 weeks [] 7 weeks     [] 4 days   [] 4 weeks [] 8 weeks    Rehab Potential: [] Excellent [x] Good [] Fair  [] Poor       Electronically signed by:  Talha Ingram PT      If you have any questions or concerns, please don't hesitate to call.   Thank you for your referral.      Physician Signature:________________________________Date:__________________  By signing above, therapists plan is approved by physician

## 2021-02-23 RX ORDER — METOPROLOL SUCCINATE 100 MG/1
TABLET, EXTENDED RELEASE ORAL
Qty: 90 TABLET | Refills: 2 | Status: SHIPPED | OUTPATIENT
Start: 2021-02-23 | End: 2022-01-03

## 2021-03-04 NOTE — TELEPHONE ENCOUNTER
Medication Refill    torsemide (DEMADEX) 20 MG tablet  Take 1 tablet by mouth daily At 5pm. May take an extra dose for 1-2 days only for worsening shortness of breath      [address], [address] [place] [address] [phone]

## 2021-03-04 NOTE — TELEPHONE ENCOUNTER
Last OV: 08/19/2020  Next OV: x  Labs: cbc 11/22/2020, bmp, renal 09/23/2019    Last EKG (if needed):02/17/2020

## 2021-03-05 RX ORDER — TORSEMIDE 20 MG/1
20 TABLET ORAL DAILY
Qty: 30 TABLET | Refills: 5 | Status: ON HOLD | OUTPATIENT
Start: 2021-03-05 | End: 2021-05-08 | Stop reason: SDUPTHER

## 2021-04-15 ENCOUNTER — TELEPHONE (OUTPATIENT)
Dept: CARDIOLOGY CLINIC | Age: 76
End: 2021-04-15

## 2021-04-15 DIAGNOSIS — I25.5 ISCHEMIC CARDIOMYOPATHY: Primary | ICD-10-CM

## 2021-05-03 ENCOUNTER — HOSPITAL ENCOUNTER (INPATIENT)
Age: 76
LOS: 5 days | Discharge: HOME | DRG: 291 | End: 2021-05-08
Attending: EMERGENCY MEDICINE | Admitting: STUDENT IN AN ORGANIZED HEALTH CARE EDUCATION/TRAINING PROGRAM
Payer: MEDICARE

## 2021-05-03 ENCOUNTER — TELEPHONE (OUTPATIENT)
Dept: CARDIOLOGY CLINIC | Age: 76
End: 2021-05-03

## 2021-05-03 ENCOUNTER — APPOINTMENT (OUTPATIENT)
Dept: CT IMAGING | Age: 76
DRG: 291 | End: 2021-05-03
Payer: MEDICARE

## 2021-05-03 ENCOUNTER — APPOINTMENT (OUTPATIENT)
Dept: GENERAL RADIOLOGY | Age: 76
DRG: 291 | End: 2021-05-03
Payer: MEDICARE

## 2021-05-03 DIAGNOSIS — D64.9 ANEMIA, UNSPECIFIED TYPE: ICD-10-CM

## 2021-05-03 DIAGNOSIS — N28.9 ACUTE ON CHRONIC RENAL INSUFFICIENCY: ICD-10-CM

## 2021-05-03 DIAGNOSIS — D69.6 THROMBOCYTOPENIA (HCC): ICD-10-CM

## 2021-05-03 DIAGNOSIS — N18.9 ACUTE ON CHRONIC RENAL INSUFFICIENCY: ICD-10-CM

## 2021-05-03 DIAGNOSIS — J81.0 ACUTE PULMONARY EDEMA (HCC): Primary | ICD-10-CM

## 2021-05-03 DIAGNOSIS — R77.8 ELEVATED TROPONIN: ICD-10-CM

## 2021-05-03 DIAGNOSIS — I48.0 PAROXYSMAL ATRIAL FIBRILLATION (HCC): ICD-10-CM

## 2021-05-03 PROBLEM — I48.91 NEW ONSET ATRIAL FIBRILLATION (HCC): Status: ACTIVE | Noted: 2021-05-03

## 2021-05-03 LAB
ACANTHOCYTES: ABNORMAL
ANION GAP SERPL CALCULATED.3IONS-SCNC: 14 MMOL/L (ref 3–16)
ANISOCYTOSIS: ABNORMAL
B-TYPE NATRIURETIC PEPTIDE: ABNORMAL PG/ML
BASOPHILS ABSOLUTE: 0 K/UL (ref 0–0.2)
BASOPHILS RELATIVE PERCENT: 0 %
BUN BLDV-MCNC: 21 MG/DL (ref 7–20)
CALCIUM SERPL-MCNC: 8.9 MG/DL (ref 8.3–10.6)
CHLORIDE BLD-SCNC: 105 MMOL/L (ref 99–110)
CO2: 21 MMOL/L (ref 21–32)
CREAT SERPL-MCNC: 1.9 MG/DL (ref 0.8–1.3)
EOSINOPHILS ABSOLUTE: 0 K/UL (ref 0–0.6)
EOSINOPHILS RELATIVE PERCENT: 0 %
GFR AFRICAN AMERICAN: 42
GFR NON-AFRICAN AMERICAN: 35
GLUCOSE BLD-MCNC: 124 MG/DL (ref 70–99)
GLUCOSE BLD-MCNC: 137 MG/DL (ref 70–99)
HAIRY CELLS: PRESENT
HCT VFR BLD CALC: 29.3 % (ref 40.5–52.5)
HEMATOLOGY PATH CONSULT: NO
HEMOGLOBIN: 8.3 G/DL (ref 13.5–17.5)
HYPOCHROMIA: ABNORMAL
IRON SATURATION: 7 % (ref 20–50)
IRON: 20 UG/DL (ref 59–158)
LYMPHOCYTES ABSOLUTE: 2.6 K/UL (ref 1–5.1)
LYMPHOCYTES RELATIVE PERCENT: 44 %
MCH RBC QN AUTO: 16.6 PG (ref 26–34)
MCHC RBC AUTO-ENTMCNC: 28.2 G/DL (ref 31–36)
MCV RBC AUTO: 58.9 FL (ref 80–100)
MICROCYTES: ABNORMAL
MONOCYTES ABSOLUTE: 0.3 K/UL (ref 0–1.3)
MONOCYTES RELATIVE PERCENT: 5 %
NEUTROPHILS ABSOLUTE: 3 K/UL (ref 1.7–7.7)
NEUTROPHILS RELATIVE PERCENT: 51 %
OCCULT BLOOD DIAGNOSTIC: NORMAL
OVALOCYTES: ABNORMAL
PDW BLD-RTO: 23 % (ref 12.4–15.4)
PERFORMED ON: ABNORMAL
PLATELET # BLD: 124 K/UL (ref 135–450)
PMV BLD AUTO: 8.3 FL (ref 5–10.5)
POIKILOCYTES: ABNORMAL
POLYCHROMASIA: ABNORMAL
POTASSIUM SERPL-SCNC: 4.9 MMOL/L (ref 3.5–5.1)
PRO-BNP: ABNORMAL PG/ML (ref 0–449)
RBC # BLD: 4.97 M/UL (ref 4.2–5.9)
SCHISTOCYTES: ABNORMAL
SODIUM BLD-SCNC: 140 MMOL/L (ref 136–145)
TEAR DROP CELLS: ABNORMAL
TOTAL IRON BINDING CAPACITY: 267 UG/DL (ref 260–445)
TROPONIN: 0.04 NG/ML
TROPONIN: 0.05 NG/ML
TSH SERPL DL<=0.05 MIU/L-ACNC: 2.42 UIU/ML (ref 0.27–4.2)
WBC # BLD: 5.9 K/UL (ref 4–11)

## 2021-05-03 PROCEDURE — 83880 ASSAY OF NATRIURETIC PEPTIDE: CPT

## 2021-05-03 PROCEDURE — 96374 THER/PROPH/DIAG INJ IV PUSH: CPT

## 2021-05-03 PROCEDURE — 84484 ASSAY OF TROPONIN QUANT: CPT

## 2021-05-03 PROCEDURE — 80048 BASIC METABOLIC PNL TOTAL CA: CPT

## 2021-05-03 PROCEDURE — 71250 CT THORAX DX C-: CPT

## 2021-05-03 PROCEDURE — 1200000000 HC SEMI PRIVATE

## 2021-05-03 PROCEDURE — 83540 ASSAY OF IRON: CPT

## 2021-05-03 PROCEDURE — 85025 COMPLETE CBC W/AUTO DIFF WBC: CPT

## 2021-05-03 PROCEDURE — 36415 COLL VENOUS BLD VENIPUNCTURE: CPT

## 2021-05-03 PROCEDURE — 2580000003 HC RX 258: Performed by: NURSE PRACTITIONER

## 2021-05-03 PROCEDURE — 71045 X-RAY EXAM CHEST 1 VIEW: CPT

## 2021-05-03 PROCEDURE — 93005 ELECTROCARDIOGRAM TRACING: CPT | Performed by: EMERGENCY MEDICINE

## 2021-05-03 PROCEDURE — G0328 FECAL BLOOD SCRN IMMUNOASSAY: HCPCS

## 2021-05-03 PROCEDURE — 83550 IRON BINDING TEST: CPT

## 2021-05-03 PROCEDURE — 6370000000 HC RX 637 (ALT 250 FOR IP): Performed by: NURSE PRACTITIONER

## 2021-05-03 PROCEDURE — 6360000002 HC RX W HCPCS: Performed by: EMERGENCY MEDICINE

## 2021-05-03 PROCEDURE — 99284 EMERGENCY DEPT VISIT MOD MDM: CPT

## 2021-05-03 PROCEDURE — 84443 ASSAY THYROID STIM HORMONE: CPT

## 2021-05-03 PROCEDURE — 6360000002 HC RX W HCPCS: Performed by: NURSE PRACTITIONER

## 2021-05-03 RX ORDER — ACETAMINOPHEN 325 MG/1
650 TABLET ORAL EVERY 6 HOURS PRN
Status: DISCONTINUED | OUTPATIENT
Start: 2021-05-03 | End: 2021-05-08 | Stop reason: HOSPADM

## 2021-05-03 RX ORDER — SODIUM CHLORIDE 0.9 % (FLUSH) 0.9 %
5-40 SYRINGE (ML) INJECTION EVERY 12 HOURS SCHEDULED
Status: DISCONTINUED | OUTPATIENT
Start: 2021-05-03 | End: 2021-05-08 | Stop reason: HOSPADM

## 2021-05-03 RX ORDER — ACETAMINOPHEN 650 MG/1
650 SUPPOSITORY RECTAL EVERY 6 HOURS PRN
Status: DISCONTINUED | OUTPATIENT
Start: 2021-05-03 | End: 2021-05-08 | Stop reason: HOSPADM

## 2021-05-03 RX ORDER — FUROSEMIDE 10 MG/ML
40 INJECTION INTRAMUSCULAR; INTRAVENOUS 2 TIMES DAILY
Status: DISCONTINUED | OUTPATIENT
Start: 2021-05-04 | End: 2021-05-05

## 2021-05-03 RX ORDER — ONDANSETRON 2 MG/ML
4 INJECTION INTRAMUSCULAR; INTRAVENOUS EVERY 6 HOURS PRN
Status: DISCONTINUED | OUTPATIENT
Start: 2021-05-03 | End: 2021-05-08 | Stop reason: HOSPADM

## 2021-05-03 RX ORDER — HEPARIN SODIUM 5000 [USP'U]/ML
5000 INJECTION, SOLUTION INTRAVENOUS; SUBCUTANEOUS EVERY 8 HOURS SCHEDULED
Status: DISCONTINUED | OUTPATIENT
Start: 2021-05-03 | End: 2021-05-04

## 2021-05-03 RX ORDER — SODIUM CHLORIDE 9 MG/ML
25 INJECTION, SOLUTION INTRAVENOUS PRN
Status: DISCONTINUED | OUTPATIENT
Start: 2021-05-03 | End: 2021-05-08 | Stop reason: HOSPADM

## 2021-05-03 RX ORDER — METOPROLOL SUCCINATE 50 MG/1
100 TABLET, EXTENDED RELEASE ORAL DAILY
Status: DISCONTINUED | OUTPATIENT
Start: 2021-05-04 | End: 2021-05-08 | Stop reason: HOSPADM

## 2021-05-03 RX ORDER — FUROSEMIDE 10 MG/ML
40 INJECTION INTRAMUSCULAR; INTRAVENOUS ONCE
Status: COMPLETED | OUTPATIENT
Start: 2021-05-03 | End: 2021-05-03

## 2021-05-03 RX ORDER — SODIUM CHLORIDE 0.9 % (FLUSH) 0.9 %
5-40 SYRINGE (ML) INJECTION PRN
Status: DISCONTINUED | OUTPATIENT
Start: 2021-05-03 | End: 2021-05-08 | Stop reason: HOSPADM

## 2021-05-03 RX ORDER — POLYETHYLENE GLYCOL 3350 17 G/17G
17 POWDER, FOR SOLUTION ORAL DAILY PRN
Status: DISCONTINUED | OUTPATIENT
Start: 2021-05-03 | End: 2021-05-08 | Stop reason: HOSPADM

## 2021-05-03 RX ORDER — ASPIRIN 81 MG/1
81 TABLET, CHEWABLE ORAL DAILY
Status: DISCONTINUED | OUTPATIENT
Start: 2021-05-04 | End: 2021-05-08 | Stop reason: HOSPADM

## 2021-05-03 RX ORDER — SPIRONOLACTONE 25 MG/1
25 TABLET ORAL DAILY
Status: DISCONTINUED | OUTPATIENT
Start: 2021-05-04 | End: 2021-05-08 | Stop reason: HOSPADM

## 2021-05-03 RX ORDER — ATORVASTATIN CALCIUM 40 MG/1
40 TABLET, FILM COATED ORAL NIGHTLY
Status: DISCONTINUED | OUTPATIENT
Start: 2021-05-03 | End: 2021-05-08 | Stop reason: HOSPADM

## 2021-05-03 RX ORDER — PROMETHAZINE HYDROCHLORIDE 25 MG/1
12.5 TABLET ORAL EVERY 6 HOURS PRN
Status: DISCONTINUED | OUTPATIENT
Start: 2021-05-03 | End: 2021-05-08 | Stop reason: HOSPADM

## 2021-05-03 RX ORDER — DIGOXIN 0.25 MG/ML
125 INJECTION INTRAMUSCULAR; INTRAVENOUS ONCE
Status: COMPLETED | OUTPATIENT
Start: 2021-05-03 | End: 2021-05-03

## 2021-05-03 RX ORDER — FERROUS SULFATE TAB EC 324 MG (65 MG FE EQUIVALENT) 324 (65 FE) MG
324 TABLET DELAYED RESPONSE ORAL
Status: DISCONTINUED | OUTPATIENT
Start: 2021-05-04 | End: 2021-05-08 | Stop reason: HOSPADM

## 2021-05-03 RX ADMIN — Medication 10 ML: at 23:06

## 2021-05-03 RX ADMIN — HEPARIN SODIUM 5000 UNITS: 5000 INJECTION INTRAVENOUS; SUBCUTANEOUS at 23:06

## 2021-05-03 RX ADMIN — DIGOXIN 125 MCG: 0.25 INJECTION INTRAMUSCULAR; INTRAVENOUS at 23:06

## 2021-05-03 RX ADMIN — ATORVASTATIN CALCIUM 40 MG: 40 TABLET, FILM COATED ORAL at 23:06

## 2021-05-03 RX ADMIN — FUROSEMIDE 40 MG: 10 INJECTION, SOLUTION INTRAMUSCULAR; INTRAVENOUS at 21:46

## 2021-05-03 ASSESSMENT — PAIN SCALES - GENERAL
PAINLEVEL_OUTOF10: 0
PAINLEVEL_OUTOF10: 0

## 2021-05-03 NOTE — ED NOTES
Bed: B-07  Expected date: 5/3/21  Expected time: 5:08 PM  Means of arrival: [facility]  Comments:  75M TUAN Zamudio, RN  05/03/21 4735

## 2021-05-03 NOTE — ED PROVIDER NOTES
[facility]  eMERGENCY dEPARTMENT eNCOUnter      Pt Name: Brooke Ocasio  MRN: [id number]  [place] 1945  Date of evaluation: 5/3/2021  Provider: Kriss Rowell MD    [address]       Chief Complaint   Patient presents with    Shortness of Breath     86% room air    Atrial Fibrillation     new         CRITICAL CARE TIME   Total Critical Care time was 0 minutes, excluding separately reportable procedures. There was a high probability of clinically significant/life threatening deterioration in the patient's condition which required my urgent intervention. HISTORY OF PRESENT ILLNESS  (Location/Symptom, Timing/Onset, Context/Setting, Quality, Duration, Modifying Factors, Severity.)   Brooke Ocasio is a 77 y.o. male who presents to the emergency department via [address] [place] [address]  from his cardiologist office with a history that he has been having some shortness of breath on and off for the last month, some swelling in his legs for about a month, some scrotal and penis swelling for 2 or 3 days. No chest pain. When he was seen in the office he was dyspneic. His heart rate was increased. He had an EKG done which apparently showed new onset atrial fibrillation. He denies chest pain at this time. He does not wear home oxygen. He does have a history of COPD. He admits to swelling in his legs. He has no leg pain. Nursing Notes were reviewed and I agree. REVIEW OF SYSTEMS    (2-9 systems for level 4, 10 or more for level 5)     General: No fever or chills. ENT: No nasal congestion sore throat or earache. Cardiovascular: No chest pain or palpitations. Pulmonary: Shortness of breath on and off for a month. He states he has a mild chronic cough which is unchanged. GI: No abdominal pain nausea or vomiting. : 2 to 3-day history of some swelling in his penis and scrotum.   Musculoskeletal: Bilateral lower extremity swelling for several weeks, probably at least a month. Neuro: No dizziness or passing out. Except as noted above the remainder of the review of systems was reviewed and negative. PAST MEDICAL HISTORY     Past Medical History:   Diagnosis Date    Anemia     Arthritis     Cancer ([facility] [address] 2005    hairy cell leukemia    Colon polyps 08/2010    Diabetes mellitus ([facility] [address]     Hepatitis C     Obesity     Splenomegaly 2010         SURGICAL HISTORY       Past Surgical History:   Procedure Laterality Date    BONE MARROW BIOPSY      COLONOSCOPY      LIVER BIOPSY      TOTAL KNEE ARTHROPLASTY Left 08/02/2017         CURRENT MEDICATIONS       Previous Medications    ASPIRIN 81 MG CHEWABLE TABLET    Take 1 tablet by mouth daily    ATORVASTATIN (LIPITOR) 40 MG TABLET    Take 1 tablet by mouth nightly    FERROUS SULFATE 325 (65 FE) MG TABLET    Take 325 mg by mouth daily (with breakfast). LISINOPRIL (PRINIVIL;ZESTRIL) 20 MG TABLET    Take 1 tablet by mouth daily    METFORMIN (GLUCOPHAGE) 1000 MG TABLET    Take 0.5 tablets by mouth daily (with breakfast)    METOPROLOL SUCCINATE (TOPROL XL) 100 MG EXTENDED RELEASE TABLET    TAKE ONE TABLET BY MOUTH DAILY    NITROGLYCERIN (NITROSTAT) 0.4 MG SL TABLET    up to max of 3 total doses. If no relief after 1 dose, call 911. SPIRONOLACTONE (ALDACTONE) 25 MG TABLET    TAKE ONE TABLET BY MOUTH DAILY    TORSEMIDE (DEMADEX) 20 MG TABLET    Take 1 tablet by mouth daily At 5pm. May take an extra dose for 1-2 days only for worsening shortness of breath       ALLERGIES     Oxycodone    FAMILY HISTORY     No family history on file.        SOCIAL HISTORY       Social History     Socioeconomic History    Marital status: Single     Spouse name: Not on file    Number of children: Not on file    Years of education: Not on file    Highest education level: Not on file   Occupational History    Not on file   Social Needs    Financial resource strain: Not on file    Food insecurity     Worry: Not on file     Inability: Not on file   ECOtality needs     Medical: Not on file     Non-medical: Not on file   Tobacco Use    Smoking status: Former Smoker     Quit date: 1981     Years since quittin.2    Smokeless tobacco: Never Used   Substance and Sexual Activity    Alcohol use: No     Alcohol/week: 0.0 standard drinks    Drug use: No    Sexual activity: Not on file   Lifestyle    Physical activity     Days per week: Not on file     Minutes per session: Not on file    Stress: Not on file   Relationships    Social connections     Talks on phone: Not on file     Gets together: Not on file     Attends Sikhism service: Not on file     Active member of club or organization: Not on file     Attends meetings of clubs or organizations: Not on file     Relationship status: Not on file    Intimate partner violence     Fear of current or ex partner: Not on file     Emotionally abused: Not on file     Physically abused: Not on file     Forced sexual activity: Not on file   Other Topics Concern    Not on file   Social History Narrative    Not on file         PHYSICAL EXAM    (up to 7 for level 4, 8 or more for level 5)     ED Triage Vitals [21 1720]   BP Temp Temp src Pulse Resp SpO2 Height Weight   124/81 -- -- 106 25 (!) 86 % 5' 9\" (1.753 m) 286 lb 9.6 oz (130 kg)       General: Alert morbidly obese black male in no acute distress. Head: Atraumatic and normocephalic. Eyes: No conjunctival injection. No pallor. Pupils equal round reactive. Extraocular movements are intact. ENT: Dav Mettle is clear. Oropharynx moist without erythema. Neck: Supple without adenopathy, nontender. Heart: Tachycardic, irregular regular. No murmurs or gallops noted. Lungs: Breath sounds decreased in the bases bilaterally with basilar rales. Abdomen: Obese, soft, nontender. Rectal exam: Light brown stool. No gross blood. No masses. Stool sent for Hemoccult testing.   Musculoskeletal: 2+ mildly pitting edema bilateral lower extremities below the knees. : There is mild edema of the scrotum and penis without erythema. Neuro: Awake, alert, oriented. No focal motor deficits. Mental status: Normal affect. Skin: Warm and dry, good turgor. No pallor or cyanosis. No diaphoresis. DIFFERENTIAL DIAGNOSIS   Differential includes but is not limited to sinus tachycardia, atrial fibrillation with RVR, atrial flutter with RVR, PACs, congestive heart failure, pleural effusion  COVID-19 positive test (U07.1, COVID-19) with Acute Pneumonia (J12.89, Other viral pneumonia)  (If respiratory failure or sepsis present, add as separate assessment)    . DIAGNOSTIC RESULTS     EKG: All EKG's are interpreted by Joelle Mccrary MD in the absence of a cardiologist.    Sinus tachycardia with PACs, nonspecific T wave changes. Rhythm strip shows sinus tachycardia with a rate of 106, GA interval moderate 6 6 ms, QRS of 74 ms with no other ectopy as interpreted by me. This is compared to an EKG done earlier today in the patient's cardiologist office which shows atrial fibrillation with a rate of 109. RADIOLOGY:   Non-plain film images such as CT, Ultrasound and MRI are read by the radiologist. Plain radiographic images are visualized and preliminarily interpreted Joelle Mccrary MD with the below findings:      Interpretation per the Radiologist below, if available at the time of this note:    CT CHEST WO CONTRAST   Final Result   Small bilateral pleural effusions have developed, along with ground-glass   opacities, pulmonary edema and atelectasis favored over pneumonia. No adenopathy is identified. Stable cardiomegaly. Stable splenomegaly. Anasarca, increased compared to baseline.          XR CHEST PORTABLE   Final Result   Ill-defined pulmonary opacity seen in the right lung could represent   pulmonary edema or atypical pneumonia               ED BEDSIDE ULTRASOUND:   Performed by ED Physician - none    LABS:  Labs Reviewed   CBC WITH AUTO DIFFERENTIAL - Abnormal; Notable for the following components:       Result Value    Hemoglobin 8.3 (*)     Hematocrit 29.3 (*)     MCV 58.9 (*)     MCH 16.6 (*)     MCHC 28.2 (*)     RDW 23.0 (*)     Platelets 631 (*)     Hairy Cells Present (*)     Anisocytosis 1+ (*)     Microcytes 2+ (*)     Polychromasia 1+ (*)     Hypochromia 2+ (*)     Poikilocytes 1+ (*)     Schistocytes Occasional (*)     Acanthocytes Occasional (*)     Ovalocytes Occasional (*)     Tear Drop Cells Occasional (*)     All other components within normal limits    Narrative:     Performed at:  [address] [facility]   Phone [phone]   BASIC METABOLIC PANEL - Abnormal; Notable for the following components:    Glucose 137 (*)     BUN 21 (*)     CREATININE 1.9 (*)     GFR Non- 35 (*)     GFR  42 (*)     All other components within normal limits    Narrative:     Performed at:  [address] [facility]   Phone [phone]   TROPONIN - Abnormal; Notable for the following components:    Troponin 0.04 (*)     All other components within normal limits    Narrative:     Performed at:  [address] [facility]   Phone [phone]   BRAIN NATRIURETIC PEPTIDE - Abnormal; Notable for the following components:    Pro-BNP 15,199 (*)     All other components within normal limits    Narrative:     Performed at:  [address] [facility]   Phone [phone]   BLOOD OCCULT STOOL DIAGNOSTIC    Narrative:     ORDER#: [id number]                          ORDERED BY: Manuel Muniz  SOURCE: Stool                              COLLECTED:  05/03/21 19:00  ANTIBIOTICS AT HYUN.:                      RECEIVED :  05/03/21 19:27  Performed at:  [facility]  [address]   Phone [phone]       All other labs were within normal range or not returned as of this dictation. EMERGENCY DEPARTMENT COURSE and DIFFERENTIAL DIAGNOSIS/MDM:   Vitals:    Vitals:    05/03/21 2015 05/03/21 2032 05/03/21 2102 05/03/21 2133   BP:  115/70 136/88 111/73   Pulse: 95 95 118 98   Resp: (!) 32 26 (!) 38 (!) 32   Temp:       TempSrc:       SpO2: 99% 99% 99% 100%   Weight:       Height: This patient has a history of heart disease. He was seen by his cardiologist.  He was noted to have a rapid heart rate and was noted to be in atrial fibrillation based on EKG done in the office. He was short of breath. He was hypoxic. He was placed on supplemental oxygen. His chest x-ray shows findings consistent with pulmonary edema. His BNP is significantly elevated at 15,000. His troponin is elevated at 0.04. He is back in a normal sinus rhythm here in the emergency department. He has no acute ST-T wave changes. He is not having chest pain. Was noted to be anemic today, his hemoglobin was down approximately 3-1/2 g from January of last year. He reports no blood in his stool, no black stool. His stool was brown in color and Hemoccult negative here. He has been chronically thrombocytopenic, and his platelet count is 090,894 today. He was given a dose of Lasix IV here. He will need to be admitted for monitoring for his paroxysmal atrial fib, further treatment of his pulmonary edema/heart failure, continued assessment of his anemia. His renal function is also noted to be slightly worse than his most recent values for comparison. Test results, diagnosis, and treatment plan were discussed with the patient. He understands the treatment plan and need for admission and is agreeable. The hospitalist was consulted for admission.   The case was discussed with the hospitalist, Antelmo Quinonez

## 2021-05-03 NOTE — TELEPHONE ENCOUNTER
Received a call from Brigid Boxer who is seeing the patient in the office for worsening shortness of breath. She reports O2 sat 88-90%, 3+ pitting edema, he's tachycardiac and states overall he just feels \"bad. \"     He is scheduled for an echo 5/24 then follow up with Dr Ele Mcghee. She is contemplating sending directly to the ED but awaiting EKG. Offered to move up echo and get in with Dr. Ele Mcghee tomorrow if needed. She returned call and states he is in Afib and sending to the ED, patient is agreeable to proceed to the ED. Instructed if he comes to [facility] our physicians can see or facilitate a follow up if needed. She v/u.

## 2021-05-04 LAB
ACANTHOCYTES: ABNORMAL
ANION GAP SERPL CALCULATED.3IONS-SCNC: 8 MMOL/L (ref 3–16)
ANISOCYTOSIS: ABNORMAL
ATYPICAL LYMPHOCYTE RELATIVE PERCENT: 5 % (ref 0–6)
BASOPHILS ABSOLUTE: 0 K/UL (ref 0–0.2)
BASOPHILS RELATIVE PERCENT: 1 %
BUN BLDV-MCNC: 24 MG/DL (ref 7–20)
CALCIUM SERPL-MCNC: 8 MG/DL (ref 8.3–10.6)
CHLORIDE BLD-SCNC: 104 MMOL/L (ref 99–110)
CHOLESTEROL, TOTAL: 62 MG/DL (ref 0–199)
CO2: 23 MMOL/L (ref 21–32)
CREAT SERPL-MCNC: 1.8 MG/DL (ref 0.8–1.3)
EOSINOPHILS ABSOLUTE: 0 K/UL (ref 0–0.6)
EOSINOPHILS RELATIVE PERCENT: 1 %
GFR AFRICAN AMERICAN: 45
GFR NON-AFRICAN AMERICAN: 37
GLUCOSE BLD-MCNC: 133 MG/DL (ref 70–99)
GLUCOSE BLD-MCNC: 155 MG/DL (ref 70–99)
GLUCOSE BLD-MCNC: 195 MG/DL (ref 70–99)
GLUCOSE BLD-MCNC: 197 MG/DL (ref 70–99)
GLUCOSE BLD-MCNC: 212 MG/DL (ref 70–99)
HCT VFR BLD CALC: 26.9 % (ref 40.5–52.5)
HDLC SERPL-MCNC: 18 MG/DL (ref 40–60)
HEMATOLOGY PATH CONSULT: NO
HEMOGLOBIN: 7.9 G/DL (ref 13.5–17.5)
LDL CHOLESTEROL CALCULATED: 35 MG/DL
LV EF: 40 %
LVEF MODALITY: NORMAL
LYMPHOCYTES ABSOLUTE: 2.1 K/UL (ref 1–5.1)
LYMPHOCYTES RELATIVE PERCENT: 39 %
MAGNESIUM: 2.3 MG/DL (ref 1.8–2.4)
MCH RBC QN AUTO: 17.5 PG (ref 26–34)
MCHC RBC AUTO-ENTMCNC: 29.5 G/DL (ref 31–36)
MCV RBC AUTO: 59.1 FL (ref 80–100)
MICROCYTES: ABNORMAL
MONOCYTES ABSOLUTE: 0.2 K/UL (ref 0–1.3)
MONOCYTES RELATIVE PERCENT: 5 %
NEUTROPHILS ABSOLUTE: 2.3 K/UL (ref 1.7–7.7)
NEUTROPHILS RELATIVE PERCENT: 49 %
OVALOCYTES: ABNORMAL
PDW BLD-RTO: 22.3 % (ref 12.4–15.4)
PERFORMED ON: ABNORMAL
PLATELET # BLD: 123 K/UL (ref 135–450)
PMV BLD AUTO: 8.1 FL (ref 5–10.5)
POLYCHROMASIA: ABNORMAL
POTASSIUM SERPL-SCNC: 4.5 MMOL/L (ref 3.5–5.1)
RBC # BLD: 4.54 M/UL (ref 4.2–5.9)
SCHISTOCYTES: ABNORMAL
SODIUM BLD-SCNC: 135 MMOL/L (ref 136–145)
TEAR DROP CELLS: ABNORMAL
TRIGL SERPL-MCNC: 44 MG/DL (ref 0–150)
TROPONIN: 0.04 NG/ML
VLDLC SERPL CALC-MCNC: 9 MG/DL
WBC # BLD: 4.7 K/UL (ref 4–11)

## 2021-05-04 PROCEDURE — 1200000000 HC SEMI PRIVATE

## 2021-05-04 PROCEDURE — 94761 N-INVAS EAR/PLS OXIMETRY MLT: CPT

## 2021-05-04 PROCEDURE — 6370000000 HC RX 637 (ALT 250 FOR IP): Performed by: NURSE PRACTITIONER

## 2021-05-04 PROCEDURE — 93306 TTE W/DOPPLER COMPLETE: CPT

## 2021-05-04 PROCEDURE — 85025 COMPLETE CBC W/AUTO DIFF WBC: CPT

## 2021-05-04 PROCEDURE — 83735 ASSAY OF MAGNESIUM: CPT

## 2021-05-04 PROCEDURE — 6370000000 HC RX 637 (ALT 250 FOR IP): Performed by: FAMILY MEDICINE

## 2021-05-04 PROCEDURE — 80061 LIPID PANEL: CPT

## 2021-05-04 PROCEDURE — 2700000000 HC OXYGEN THERAPY PER DAY

## 2021-05-04 PROCEDURE — 36415 COLL VENOUS BLD VENIPUNCTURE: CPT

## 2021-05-04 PROCEDURE — 6360000002 HC RX W HCPCS: Performed by: NURSE PRACTITIONER

## 2021-05-04 PROCEDURE — 2580000003 HC RX 258: Performed by: NURSE PRACTITIONER

## 2021-05-04 PROCEDURE — 84484 ASSAY OF TROPONIN QUANT: CPT

## 2021-05-04 PROCEDURE — 99223 1ST HOSP IP/OBS HIGH 75: CPT | Performed by: INTERNAL MEDICINE

## 2021-05-04 PROCEDURE — 80048 BASIC METABOLIC PNL TOTAL CA: CPT

## 2021-05-04 RX ADMIN — HEPARIN SODIUM 5000 UNITS: 5000 INJECTION INTRAVENOUS; SUBCUTANEOUS at 05:38

## 2021-05-04 RX ADMIN — FUROSEMIDE 40 MG: 10 INJECTION, SOLUTION INTRAMUSCULAR; INTRAVENOUS at 17:45

## 2021-05-04 RX ADMIN — Medication 10 ML: at 20:32

## 2021-05-04 RX ADMIN — Medication 10 ML: at 09:10

## 2021-05-04 RX ADMIN — ATORVASTATIN CALCIUM 40 MG: 40 TABLET, FILM COATED ORAL at 20:31

## 2021-05-04 RX ADMIN — FERROUS SULFATE TAB EC 324 MG (65 MG FE EQUIVALENT) 324 MG: 324 (65 FE) TABLET DELAYED RESPONSE at 09:14

## 2021-05-04 RX ADMIN — HEPARIN SODIUM 5000 UNITS: 5000 INJECTION INTRAVENOUS; SUBCUTANEOUS at 14:33

## 2021-05-04 RX ADMIN — FUROSEMIDE 40 MG: 10 INJECTION, SOLUTION INTRAMUSCULAR; INTRAVENOUS at 11:28

## 2021-05-04 RX ADMIN — APIXABAN 5 MG: 5 TABLET, FILM COATED ORAL at 20:31

## 2021-05-04 RX ADMIN — SPIRONOLACTONE 25 MG: 25 TABLET ORAL at 09:10

## 2021-05-04 RX ADMIN — METOPROLOL SUCCINATE 100 MG: 50 TABLET, EXTENDED RELEASE ORAL at 09:10

## 2021-05-04 RX ADMIN — ASPIRIN 81 MG: 81 TABLET, CHEWABLE ORAL at 09:10

## 2021-05-04 ASSESSMENT — PAIN SCALES - GENERAL
PAINLEVEL_OUTOF10: 0
PAINLEVEL_OUTOF10: 0

## 2021-05-04 NOTE — PROGRESS NOTES
Hospitalist Progress Note      PCP: Manny Holly    Date of Admission: 5/3/2021    Chief Complaint: fluid overload    Hospital Course:      Subjective:  HR's improved. Diuresing      Medications:  Reviewed    Infusion Medications    sodium chloride       Scheduled Medications    aspirin  81 mg Oral Daily    atorvastatin  40 mg Oral Nightly    ferrous sulfate  324 mg Oral Daily with breakfast    metoprolol succinate  100 mg Oral Daily    spironolactone  25 mg Oral Daily    sodium chloride flush  5-40 mL Intravenous 2 times per day    heparin (porcine)  5,000 Units Subcutaneous 3 times per day    furosemide  40 mg Intravenous BID     PRN Meds: sodium chloride flush, sodium chloride, promethazine **OR** ondansetron, polyethylene glycol, acetaminophen **OR** acetaminophen, perflutren lipid microspheres      Intake/Output Summary (Last 24 hours) at 5/4/2021 1027  Last data filed at 5/4/2021 0907  Gross per 24 hour   Intake 540 ml   Output 600 ml   Net -60 ml       Exam:    /75   Pulse 90   Temp 98.3 °F (36.8 °C) (Oral)   Resp 14   Ht 5' 9\" (1.753 m)   Wt 283 lb 11.7 oz (128.7 kg)   SpO2 97%   BMI 41.90 kg/m²     General appearance: No apparent distress, appears stated age and cooperative. HEENT: Pupils equal, round, and reactive to light. Conjunctivae/corneas clear. Neck: Supple, with full range of motion. No jugular venous distention. Trachea midline. Respiratory:  Normal respiratory effort. Clear to auscultation, bilaterally without Rales/Wheezes/Rhonchi. Cardiovascular: Regular rate and rhythm with normal S1/S2 without murmurs, rubs or gallops. Abdomen: Soft, non-tender, non-distended with normal bowel sounds. Musculoskeletal: No clubbing, cyanosis. Significant edema bilaterally in lower extremities. Full range of motion without deformity. Skin: Skin color, texture, turgor normal.  No rashes or lesions.   Neurologic:  Neurovascularly intact without any focal sensory/motor

## 2021-05-04 NOTE — PROGRESS NOTES
4 Eyes Skin Assessment     NAME:  Danika Monsivais  YOB: 1945  MEDICAL RECORD NUMBER:  [id number]    The patient is being assess for  Admission    I agree that 2 RN's have performed a thorough Head to Toe Skin Assessment on the patient. ALL assessment sites listed below have been assessed. Areas assessed by both nurses:    Head, Face, Ears, Shoulders, Back, Chest, Arms, Elbows, Hands, Sacrum. Buttock, Coccyx, Ischium and Legs. Feet and Heels        Does the Patient have a Wound?  No noted wound(s)       Barry Prevention initiated:  Yes   Wound Care Orders initiated:  No    Pressure Injury (Stage 3,4, Unstageable, DTI, NWPT, and Complex wounds) if present place consult order under [de-identified] No    New and Established Ostomies if present place consult order under : No      Nurse 1 eSignature: Electronically signed by Endy Williamson RN on 5/3/21 at 11:21 PM EDT    **SHARE this note so that the co-signing nurse is able to place an eSignature**    Nurse 2 eSignature: Electronically signed by Sameera Ruiz RN on 5/4/21 at 12:19 AM EDT

## 2021-05-04 NOTE — CARE COORDINATION
DISCHARGE PLAN: Pt plans to return home upon d/c. Denied any d/c needs at this time. ___________________________________    Met w/pt to address barriers to dc. HOME: Pt reported that he resides alone in an apartment. Pt stated that there are 7 steps to get in the building and then he uses an elevator in the building to get to the 4th floor. Disease Specific: New A fib    COVID Vaccination: Yes-Pfizer    DME/O2: Cane, shower chair, grab bars in the shower. ACTIVE SERVICES: Pt reported that he was independent with all self care PTA and has the support of his grandchildren if needed. Pt stated that he does not feel that he will need additional support at d/c. Pt reported that he has been up and about in the hospital room and does not feel that any therapy will benefit him. TRANSPORTATION: Pt is an active  and stated that one of his grandchildren will transport him home at d/c. PHARMACY: Denies difficulty obtaining/taking meds. Pt has all scripts filled at [facility]. PCP: Katy Ortiz    DEMOGRAPHICS: Verified address/phone number as correct    INSURANCE:  [employer]  PRESCRIPTION COVERAGE: [employer]    HD/PD: No      THERAPY RECS Not ordered    Discharge planning team will remain available for needs. Please consult for any specifics not addressed in this note.     Myrtle Crane [place]  [phone]  Electronically signed by Lianna Salcido on 5/4/2021 at 1:08 PM

## 2021-05-04 NOTE — H&P
Hospital Medicine History & Physical      PCP: Katy Ortiz    Date of Admission: 5/3/2021    Date of Service: Pt seen/examined on 5/3/2021 and Admitted to Inpatient      Chief Complaint:  SOB, BLE swelling and scrotal swelling      History Of Present Illness: The patient is a 77 y.o. male who presents to [facility] with PMHx: Hep C, anemia, splenomegaly, hairy cell leukemia, DM,  Colon polyps. Patient presented to his primary care today for ongoing shortness of breath, bilateral lower extremity swelling and he started noticing that his scrotum was starting to swell. She noted in the office that his EKG was positive for tachycardia and suspicious for atrial fibrillation. She called Dr. Esther Warner and discussed the matter with Dr. Esther Warner and he recommended that the patient be sent to the emergency department. ED work-up revealed evidence of worsening chronic anemia, continued chronic thrombocytopenia, evidence of ANITA, acute respiratory failure with hypoxia requiring oxygen therapy, acute heart failure, CT of the chest indicating small bilateral pleural effusions. EKG is negative for evidence of STEMI, troponin was elevated 0.04 and BNP elevated 15, 199. ED provider examined the EKG that was sent in from the PCP office and the EKG that was obtained in the ED and there appears to be some evidence of possible paroxysmal atrial fibrillation. Hemoccult negative. On my exam the patient is awake and alert sitting head of bed elevated recumbent position. No evidence of JVD. No evidence of respiratory distress. Lung fields are clear. Notable 2+ pitting edema feet to knees bilaterally. I did not examine the scrotum. No evidence of conversational dyspnea. Patient denies orthopnea and he denies any evidence to his knowing of weight gain.     Lives at home alone  CODE file.    REVIEW OF SYSTEMS:   Positive for shortness of breath, leg swelling and as noted in the HPI. All other systems reviewed and negative. PHYSICAL EXAM:    BP (!) 143/89   Pulse 100   Temp 98 °F (36.7 °C) (Oral)   Resp (!) 34   Ht 5' 9\" (1.753 m)   Wt 283 lb 11.7 oz (128.7 kg)   SpO2 98%   BMI 41.90 kg/m²     General appearance: No apparent distress appears stated age and cooperative. HEENT Normal cephalic, atraumatic without obvious deformity. Pupils equal, round, and reactive to light. Extra ocular muscles intact. Conjunctivae/corneas clear. Neck: Supple, No jugular venous distention/bruits. Trachea midline without thyromegaly or adenopathy with full range of motion. Lungs: Clear to auscultation, bilaterally without Rales/Wheezes/Rhonchi with good respiratory effort. Heart: Intermittently irregular irregular, no murmurs rubs or gallops. No JVD  Abdomen: Soft, non-tender or non-distended without rigidity or guarding and positive bowel sounds all four quadrants. Extremities: 2+ pitting feet to knees, subjectively scrotal swelling  Skin: Skin color, texture, turgor normal.  No rashes or lesions. Neurologic: Alert and oriented X 3, neurovascularly intact with sensory/motor intact upper extremities/lower extremities, bilaterally. Cranial nerves: II-XII intact, grossly non-focal.  Mental status: Alert, oriented, thought content appropriate. Capillary Refill: Acceptable  < 3 seconds  Peripheral Pulses: +3 Easily felt, not easily obliterated with pressure      CXR:  I have reviewed the CXR with the following interpretation: Right lung base opacity ill-defined  EKG:  I have reviewed the EKG with the following interpretation: Sinus tachycardia: Rate 104 narrow complex. No evidence of STEMI or ischemia. CT chest bilateral pleural effusions.     CBC   Recent Labs     05/03/21  1745   WBC 5.9   HGB 8.3*   HCT 29.3*   *      RENAL  Recent Labs     05/03/21  1745      K 4.9    CO2 21   BUN 21*   CREATININE 1.9*     LFT'S  No results for input(s): AST, ALT, ALB, BILIDIR, BILITOT, ALKPHOS in the last 72 hours. COAG  No results for input(s): INR in the last 72 hours. CARDIAC ENZYMES  Recent Labs     05/03/21  1745 05/03/21  2300   TROPONINI 0.04* 0.05*       U/A:    Lab Results   Component Value Date    COLORU YELLOW 01/22/2020    WBCUA 1 01/22/2020    RBCUA 2 01/22/2020    MUCUS 2+ 07/19/2017    BACTERIA 1+ 07/19/2017    CLARITYU Clear 01/22/2020    SPECGRAV 1.021 01/22/2020    LEUKOCYTESUR Negative 01/22/2020    BLOODU SMALL 01/22/2020    GLUCOSEU Negative 01/22/2020       ABG  No results found for: VUO3FXD, BEART, U4KUPKHD, PHART, THGBART, XUV4URX, PO2ART, IDP9AWF        Active Hospital Problems    Diagnosis Date Noted    New onset atrial fibrillation ([facility] [place] [address] [I48.91] 05/03/2021         PHYSICIANS CERTIFICATION:    I certify that Ignacio Ha is expected to be hospitalized for more than 2 midnights based on the following assessment and plan:      ASSESSMENT/PLAN:    Acute respiratory failure with hypoxia: 86% RA  Normally does not require oxygen therapy  2/2-> HF, & bilateral pleural effusions  Supportive, oxygen therapy maintaining saturations greater than 92%    Acute HF: BNP: 15,199, TROP: 0.04 -> 0.05  CXR and CT chest: stable cardiomegly with pleural effusions. Trend troponins: r/o NSTEMI, likely elevated d/t hypoxia and HF demand   I & O  Daily wt  Lasix 40mg IVP, BID  HF RN consulted  ECHO in a.m. Cardiology consult  LHC: 2018  CORONARY ANGIOGRAM:  1. There is branch vessel disease in this codominant circulation. The  arteries are large. 2.  The left main is free of disease. 3.  LAD is a large vessel, it reaches the apex. The first diagonal has  an 80% to 90% ostial lesion. The second diagonal is subtotally occluded  and is the culprit vessel. 4.  The left circumflex artery is free of disease. It is a codominant  vessel.   5.  The codominant right also has no major obstructive disease.     CONCLUSION:  1. Branch vessel coronary artery disease involving first and second  diagonal.  The second diagonal is the culprit vessel with subtotal  occlusion. The first diagonal has an 80% ostial stenosis. 2.  Markedly elevated left heart filling pressures. Echo: 2018  Summary   Normal LV size with global systolic dysfunction. EF 35%. Distal anterior   hypokinesis. The left atrium is severely dilated. Normal right ventricular size and function. Trivial Mitral and Tricuspid regurgitation. New onset afib:? Paroxysmal  Telemetry review and EKG review does not appear to be consistently in A. fib, appears to be intermittent. Also appears to be rate controlled at present  Holding off on antiarrhythmic therapy other than beta-blocker at present       Acute on chronic anemia: ? Ckd, hairy cell leukemia  Chronic hypochromic microcytic  Iron deficient: Feo4: low: 20, Iron saturation: low: 7  Hemoccult negative    HTN: Continue spironolactone and beta-blocker, ASA and statin therapy  DM: ,   currently not on diabetic regimen     Chronic thrombocytopenia: 124  Likely 2/2->Hairy cell leukemia    ANITA : BUN: 21, Cr: 1.9, GFR: 42  appears to have intermittent chronicity of elevated Cr and low GFR  Dec: 2018: Cr: 1.6, GFR: 52, Jan 2019: Cr: 1.7, GFR: 48  Hold ACE    DVT Prophylaxis: Heparin SQ  Diet: DIET LOW SODIUM 2 GM;  Code Status: Full Code  PT/OT Eval Status:     Dispo - admit, inpt       Carlos Grover, APRN - CNP    Thank you Brigid Boxer for the opportunity to be involved in this patient's care. If you have any questions or concerns please feel free to contact me at [phone].

## 2021-05-04 NOTE — PROGRESS NOTES
Pt arrived via stretcher to room 5126. Pts vitals are taken and hear monitor is on and verified with [facility]. Pt is alert and oriented and is instructed on when to use the call light for assistance. Admission has been initiated and orders are reviewed. Will continue to monitor.      Electronically signed by Isra Avila RN on 5/3/2021 at 11:22 PM

## 2021-05-04 NOTE — ED NOTES
Pt aware of plan/process of admission w dx to [facility]. Urinal @ bedside to void.       Hosey Gitelman, RN  05/03/21 2158       Hosey Gitelman, RN  05/03/21 9846

## 2021-05-04 NOTE — CONSULTS
[address]  Cardiology Consult Note        CC:      Shortness of breath and lower extremity edema             HPI:   This is a 77 y.o. male went to see his primary caretaker for penile edema, lower extremity edema and shortness of breath. The nurse practitioner called the squad and sent him here. He mentions that she thought that he was in A. fib after doing an EKG in the office. However an EKG that I saw then the ER shows sinus tach    Patient states that he takes his torsemide daily but does miss out on other drugs. There is no change in his medications    Several years ago I had done an angioplasty on one of his diagonal branches his main vessels were fine he had markedly elevated left heart filling pressures at that time suggestive that he has chronic diastolic dysfunction      Past Medical History:   Diagnosis Date    Anemia     Arthritis     Cancer ([facility] [address] 2005    hairy cell leukemia    Colon polyps 2010    Diabetes mellitus ([facility] [address]     Hepatitis C     Obesity     Splenomegaly       Past Surgical History:   Procedure Laterality Date    BONE MARROW BIOPSY      COLONOSCOPY      LIVER BIOPSY      TOTAL KNEE ARTHROPLASTY Left 2017      No family history on file.    Social History     Tobacco Use    Smoking status: Former Smoker     Quit date: 1981     Years since quittin.2    Smokeless tobacco: Never Used   Substance Use Topics    Alcohol use: No     Alcohol/week: 0.0 standard drinks    Drug use: No     Allergies   Allergen Reactions    Oxycodone Rash     Pt has no problem with hydrocodone      aspirin  81 mg Oral Daily    atorvastatin  40 mg Oral Nightly    ferrous sulfate  324 mg Oral Daily with breakfast    metoprolol succinate  100 mg Oral Daily    spironolactone  25 mg Oral Daily    sodium chloride flush  5-40 mL Intravenous 2 times per day    heparin (porcine)  5,000 Units Subcutaneous 3 times per day    furosemide  40 mg Intravenous BID       Review of Systems -   Constitutional: Negative for weight gain/loss; malaise, fever  Respiratory: Negative for Asthma;  cough and hemoptysis  Cardiovascular: Negative for palpitations,dizziness   Gastrointestinal: Negative for abd.pain; constipation/diarrhea;    Genitourinary: Negative for stones; hematuria; frequency hesitancy  Integumentt: Negative for rash or pruritis  Hematologic/lymphatic: Negative for blood dyscrasia; leukemia/lymphoma  Musculoskeletal: Negative for Connective tissue disease  Neurological:  Negative for Seizure   Behavioral/Psych:Negative for Bipolar disorder, Schizophrenia; Dementia  Endocrine: negative for thyroid, parathyroid disease      Intake/Output Summary (Last 24 hours) at 5/4/2021 1119  Last data filed at 5/4/2021 0907  Gross per 24 hour   Intake 540 ml   Output 600 ml   Net -60 ml       Physical Examination:  /75   Pulse 90   Temp 98.3 °F (36.8 °C) (Oral)   Resp 14   Ht 5' 9\" (1.753 m)   Wt 283 lb 11.7 oz (128.7 kg)   SpO2 97%   BMI 41.90 kg/m²    HEENT:  Face: Atraumatic, Conjunctiva: Pink; non icteric,  Mucous Memb:  Moist, No thyromegaly or Lymphadenopathy  Respiratory:  Resp Assessment: normal, Resp Auscultation: clear   Cardiovascular: Auscultation: nl S1 & S2, Palpation:  Nl PMI;  No heaves or thrills, JVP:  normal  Abdomen: Soft, non-tender, Normal bowel sounds,  No organomegaly  Extremities: No Cyanosis or Clubbing; Edema none  Neurological: Oriented to time, place, and person, Non-anxious  Psychiatric: Normal mood and affect  Skin: Warm and dry,  No rash seen      Current Facility-Administered Medications: aspirin chewable tablet 81 mg, 81 mg, Oral, Daily  atorvastatin (LIPITOR) tablet 40 mg, 40 mg, Oral, Nightly  ferrous sulfate EC tablet 324 mg, 324 mg, Oral, Daily with breakfast  metoprolol succinate (TOPROL XL) extended release tablet 100 mg, 100 mg, Oral, Daily  spironolactone (ALDACTONE) tablet 25 mg, 25 mg, Oral, Daily  sodium chloride flush 0.9 % injection 5-40 mL, 5-40 mL, Intravenous, 2 times per day  sodium chloride flush 0.9 % injection 5-40 mL, 5-40 mL, Intravenous, PRN  0.9 % sodium chloride infusion, 25 mL, Intravenous, PRN  promethazine (PHENERGAN) tablet 12.5 mg, 12.5 mg, Oral, Q6H PRN **OR** ondansetron (ZOFRAN) injection 4 mg, 4 mg, Intravenous, Q6H PRN  polyethylene glycol (GLYCOLAX) packet 17 g, 17 g, Oral, Daily PRN  acetaminophen (TYLENOL) tablet 650 mg, 650 mg, Oral, Q6H PRN **OR** acetaminophen (TYLENOL) suppository 650 mg, 650 mg, Rectal, Q6H PRN  heparin (porcine) injection 5,000 Units, 5,000 Units, Subcutaneous, 3 times per day  furosemide (LASIX) injection 40 mg, 40 mg, Intravenous, BID  perflutren lipid microspheres (DEFINITY) injection 1.65 mg, 1.5 mL, Intravenous, ONCE PRN      Labs:   Recent Labs     05/03/21 1745 05/04/21  0439   WBC 5.9 4.7   HGB 8.3* 7.9*   HCT 29.3* 26.9*   * 123*     Recent Labs     05/03/21 1745 05/04/21  0439    135*   K 4.9 4.5   CO2 21 23   BUN 21* 24*   CREATININE 1.9* 1.8*   GLUCOSE 137* 155*     Recent Labs     05/03/21  1745   BNP 15,199     No results for input(s): PROTIME, INR in the last 72 hours. No results for input(s): APTT in the last 72 hours. Recent Labs     05/03/21  1745 05/03/21  2300 05/04/21  0128   TROPONINI 0.04* 0.05* 0.04*     Lab Results   Component Value Date    HDL 18 05/04/2021    HDL 25 06/14/2010    LDLCALC 35 05/04/2021    TRIG 44 05/04/2021     No results for input(s): AST, ALT, LABALBU in the last 72 hours. EKG:   Sinus tachycardia    Chest X-Ray:  Questionable pulmonary edema    ECHO:5/4/2021   Normal LV size with LVEF of 40%. Global dysfunction. Grade I diastolic  dysfunction with elevated L H filling pressures. Mild mitral regurgitation is present. The left atrium is moderately dilated. Trivial aortic regurgitation is present. The right ventricle is dilated. Right ventricular systolic function is  mildly reduced. Mild tricuspid regurgitation.    The right atrium is moderately dilated. Corornary angiogram  & Intervention:  1. There is branch vessel disease in this codominant circulation. The arteries are large. 2.  The left main is free of disease. 3.  LAD is a large vessel, it reaches the apex. The first diagonal has an 80% to 90% ostial lesion. The second diagonal is subtotally occluded nd is the culprit vessel. 4.  The left circumflex artery is free of disease. It is a codominant vessel. 5.  The codominant right also has no major obstructive disease. CONCLUSION:  1. Branch vessel coronary artery disease involving first and second diagonal.  The second diagonal is the culprit vessel with subtotal  occlusion. The first diagonal has an 80% ostial stenosis. 2.  Markedly elevated left heart filling pressures. 3.  Cardiomyopathy which is out of proportion to the coronary artery disease. CONCLUSION:  Successful PCI and stenting of subtotally occluded diagonal  branch. Lesion reduced from 100% to 0%. 2.5 x 23-mm JOHNSON was used. ASSESSMENT AND PLAN:      Shortness of breath lower extremity edema  Has history of diastolic dysfunction  Likely causes acute on chronic diastolic heart failure as well as renal failure (creatinine 1.8)  Echo confirms it with high LV filling pressures  Agree with IV Lasix twice daily    Going home he will need more than 20 mg of torsemide daily      May have intermittent AF.   Will monitor on Tele  High liklihood of AF         Nicolas Burns M.D  5/4/2021

## 2021-05-05 LAB
ANION GAP SERPL CALCULATED.3IONS-SCNC: 9 MMOL/L (ref 3–16)
BUN BLDV-MCNC: 33 MG/DL (ref 7–20)
CALCIUM SERPL-MCNC: 8.1 MG/DL (ref 8.3–10.6)
CHLORIDE BLD-SCNC: 104 MMOL/L (ref 99–110)
CO2: 24 MMOL/L (ref 21–32)
CREAT SERPL-MCNC: 1.9 MG/DL (ref 0.8–1.3)
EKG ATRIAL RATE: 106 BPM
EKG DIAGNOSIS: NORMAL
EKG P AXIS: 58 DEGREES
EKG P-R INTERVAL: 166 MS
EKG Q-T INTERVAL: 332 MS
EKG QRS DURATION: 74 MS
EKG QTC CALCULATION (BAZETT): 441 MS
EKG R AXIS: 0 DEGREES
EKG T AXIS: 48 DEGREES
EKG VENTRICULAR RATE: 106 BPM
GFR AFRICAN AMERICAN: 42
GFR NON-AFRICAN AMERICAN: 35
GLUCOSE BLD-MCNC: 145 MG/DL (ref 70–99)
GLUCOSE BLD-MCNC: 146 MG/DL (ref 70–99)
GLUCOSE BLD-MCNC: 151 MG/DL (ref 70–99)
GLUCOSE BLD-MCNC: 173 MG/DL (ref 70–99)
GLUCOSE BLD-MCNC: 212 MG/DL (ref 70–99)
MAGNESIUM: 2.2 MG/DL (ref 1.8–2.4)
PERFORMED ON: ABNORMAL
POTASSIUM SERPL-SCNC: 4.8 MMOL/L (ref 3.5–5.1)
SODIUM BLD-SCNC: 137 MMOL/L (ref 136–145)

## 2021-05-05 PROCEDURE — 36415 COLL VENOUS BLD VENIPUNCTURE: CPT

## 2021-05-05 PROCEDURE — 83735 ASSAY OF MAGNESIUM: CPT

## 2021-05-05 PROCEDURE — 1200000000 HC SEMI PRIVATE

## 2021-05-05 PROCEDURE — 6360000002 HC RX W HCPCS: Performed by: FAMILY MEDICINE

## 2021-05-05 PROCEDURE — 93010 ELECTROCARDIOGRAM REPORT: CPT | Performed by: INTERNAL MEDICINE

## 2021-05-05 PROCEDURE — 6360000002 HC RX W HCPCS: Performed by: INTERNAL MEDICINE

## 2021-05-05 PROCEDURE — 6370000000 HC RX 637 (ALT 250 FOR IP): Performed by: FAMILY MEDICINE

## 2021-05-05 PROCEDURE — 2580000003 HC RX 258: Performed by: NURSE PRACTITIONER

## 2021-05-05 PROCEDURE — 99233 SBSQ HOSP IP/OBS HIGH 50: CPT | Performed by: INTERNAL MEDICINE

## 2021-05-05 PROCEDURE — 6370000000 HC RX 637 (ALT 250 FOR IP): Performed by: NURSE PRACTITIONER

## 2021-05-05 PROCEDURE — 80048 BASIC METABOLIC PNL TOTAL CA: CPT

## 2021-05-05 PROCEDURE — 6360000002 HC RX W HCPCS: Performed by: NURSE PRACTITIONER

## 2021-05-05 RX ORDER — NICOTINE POLACRILEX 4 MG
15 LOZENGE BUCCAL PRN
Status: DISCONTINUED | OUTPATIENT
Start: 2021-05-05 | End: 2021-05-08 | Stop reason: HOSPADM

## 2021-05-05 RX ORDER — INSULIN GLARGINE 100 [IU]/ML
8 INJECTION, SOLUTION SUBCUTANEOUS NIGHTLY
Status: DISCONTINUED | OUTPATIENT
Start: 2021-05-05 | End: 2021-05-08 | Stop reason: HOSPADM

## 2021-05-05 RX ORDER — DEXTROSE MONOHYDRATE 25 G/50ML
12.5 INJECTION, SOLUTION INTRAVENOUS PRN
Status: DISCONTINUED | OUTPATIENT
Start: 2021-05-05 | End: 2021-05-08 | Stop reason: HOSPADM

## 2021-05-05 RX ORDER — DEXTROSE MONOHYDRATE 50 MG/ML
100 INJECTION, SOLUTION INTRAVENOUS PRN
Status: DISCONTINUED | OUTPATIENT
Start: 2021-05-05 | End: 2021-05-08 | Stop reason: HOSPADM

## 2021-05-05 RX ORDER — FUROSEMIDE 10 MG/ML
80 INJECTION INTRAMUSCULAR; INTRAVENOUS 2 TIMES DAILY
Status: DISCONTINUED | OUTPATIENT
Start: 2021-05-05 | End: 2021-05-08

## 2021-05-05 RX ADMIN — APIXABAN 5 MG: 5 TABLET, FILM COATED ORAL at 08:29

## 2021-05-05 RX ADMIN — Medication 10 ML: at 20:49

## 2021-05-05 RX ADMIN — Medication 10 ML: at 08:29

## 2021-05-05 RX ADMIN — INSULIN LISPRO 1 UNITS: 100 INJECTION, SOLUTION INTRAVENOUS; SUBCUTANEOUS at 18:29

## 2021-05-05 RX ADMIN — IRON SUCROSE 200 MG: 20 INJECTION, SOLUTION INTRAVENOUS at 11:20

## 2021-05-05 RX ADMIN — INSULIN GLARGINE 8 UNITS: 100 INJECTION, SOLUTION SUBCUTANEOUS at 20:50

## 2021-05-05 RX ADMIN — FUROSEMIDE 40 MG: 10 INJECTION, SOLUTION INTRAMUSCULAR; INTRAVENOUS at 08:28

## 2021-05-05 RX ADMIN — APIXABAN 5 MG: 5 TABLET, FILM COATED ORAL at 20:49

## 2021-05-05 RX ADMIN — SPIRONOLACTONE 25 MG: 25 TABLET ORAL at 08:29

## 2021-05-05 RX ADMIN — ASPIRIN 81 MG: 81 TABLET, CHEWABLE ORAL at 08:28

## 2021-05-05 RX ADMIN — FERROUS SULFATE TAB EC 324 MG (65 MG FE EQUIVALENT) 324 MG: 324 (65 FE) TABLET DELAYED RESPONSE at 08:28

## 2021-05-05 RX ADMIN — ATORVASTATIN CALCIUM 40 MG: 40 TABLET, FILM COATED ORAL at 20:49

## 2021-05-05 RX ADMIN — METOPROLOL SUCCINATE 100 MG: 50 TABLET, EXTENDED RELEASE ORAL at 08:29

## 2021-05-05 RX ADMIN — FUROSEMIDE 80 MG: 10 INJECTION, SOLUTION INTRAMUSCULAR; INTRAVENOUS at 18:29

## 2021-05-05 RX ADMIN — INSULIN LISPRO 2 UNITS: 100 INJECTION, SOLUTION INTRAVENOUS; SUBCUTANEOUS at 12:40

## 2021-05-05 RX ADMIN — INSULIN LISPRO 1 UNITS: 100 INJECTION, SOLUTION INTRAVENOUS; SUBCUTANEOUS at 20:50

## 2021-05-05 ASSESSMENT — PAIN SCALES - GENERAL
PAINLEVEL_OUTOF10: 0
PAINLEVEL_OUTOF10: 0

## 2021-05-05 NOTE — CONSULTS
(ALDACTONE) tablet 25 mg, 25 mg, Oral, Daily  sodium chloride flush 0.9 % injection 5-40 mL, 5-40 mL, Intravenous, 2 times per day  sodium chloride flush 0.9 % injection 5-40 mL, 5-40 mL, Intravenous, PRN  0.9 % sodium chloride infusion, 25 mL, Intravenous, PRN  promethazine (PHENERGAN) tablet 12.5 mg, 12.5 mg, Oral, Q6H PRN **OR** ondansetron (ZOFRAN) injection 4 mg, 4 mg, Intravenous, Q6H PRN  polyethylene glycol (GLYCOLAX) packet 17 g, 17 g, Oral, Daily PRN  acetaminophen (TYLENOL) tablet 650 mg, 650 mg, Oral, Q6H PRN **OR** acetaminophen (TYLENOL) suppository 650 mg, 650 mg, Rectal, Q6H PRN  perflutren lipid microspheres (DEFINITY) injection 1.65 mg, 1.5 mL, Intravenous, ONCE PRN  Allergies:  Oxycodone    Social History:    Social History     Socioeconomic History    Marital status: Single     Spouse name: Not on file    Number of children: Not on file    Years of education: Not on file    Highest education level: Not on file   Occupational History    Not on file   Social Needs    Financial resource strain: Not on file    Food insecurity     Worry: Not on file     Inability: Not on file    Transportation needs     Medical: Not on file     Non-medical: Not on file   Tobacco Use    Smoking status: Former Smoker     Quit date: 1981     Years since quittin.2    Smokeless tobacco: Never Used   Substance and Sexual Activity    Alcohol use: No     Alcohol/week: 0.0 standard drinks    Drug use: No    Sexual activity: Not on file   Lifestyle    Physical activity     Days per week: Not on file     Minutes per session: Not on file    Stress: Not on file   Relationships    Social connections     Talks on phone: Not on file     Gets together: Not on file     Attends [employer] service: Not on file     Active member of club or organization: Not on file     Attends meetings of clubs or organizations: Not on file     Relationship status: Not on file    Intimate partner violence     Fear of current or ex partner: Not on file     Emotionally abused: Not on file     Physically abused: Not on file     Forced sexual activity: Not on file   Other Topics Concern    Not on file   Social History Narrative    Not on file       Family History:   No family history on file.   REVIEW OF SYSTEMS:    CONSTITUTIONAL:  negative  EYES:  negative  HEENT:  negative  RESPIRATORY:  negative  CARDIOVASCULAR:  negative  GASTROINTESTINAL:  negative  INTEGUMENT/BREAST:  negative  HEMATOLOGIC/LYMPHATIC:  negative  ENDOCRINE:  negative  MUSCULOSKELETAL:  negative  NEUROLOGICAL:  negative  PHYSICAL EXAM:    General Appearance: alert and oriented to person, place and time, well developed and well- nourished, in no acute distress  Skin: warm and dry, no rash or erythema  Head: normocephalic and atraumatic  Eyes: pupils equal, round, and reactive to light, extraocular eye movements intact, conjunctivae normal  ENT: tympanic membrane, external ear and ear canal normal bilaterally, nose without deformity, nasal mucosa and turbinates normal without polyps  Neck: supple and non-tender without mass, no thyromegaly or thyroid nodules, no cervical lymphadenopathy  Pulmonary/Chest: clear to auscultation bilaterally- no wheezes, rales or rhonchi, normal air movement, no respiratory distress  Cardiovascular: normal rate, regular rhythm, normal S1 and S2, no murmurs, rubs, clicks, or gallops, distal pulses intact, no carotid bruits  Abdomen: soft, non-tender, non-distended, normal bowel sounds, no masses or organomegaly  Extremities: no cyanosis, clubbing or edema  Musculoskeletal: normal range of motion, no joint swelling, deformity or tenderness  Neurologic: reflexes normal and symmetric, no cranial nerve deficit, gait, coordination and speech normal  Vitals:    /75   Pulse 72   Temp 97.5 °F (36.4 °C) (Oral)   Resp 15   Ht 5' 9\" (1.753 m)   Wt 276 lb 10.8 oz (125.5 kg)   SpO2 91%   BMI 40.86 kg/m²     DATA:    CBC with Differential:    Lab Results   Component Value Date    WBC 4.7 05/04/2021    RBC 4.54 05/04/2021    RBC 4.93 06/21/2017    HGB 7.9 05/04/2021    HCT 26.9 05/04/2021     05/04/2021    MCV 59.1 05/04/2021    MCH 17.5 05/04/2021    MCHC 29.5 05/04/2021    RDW 22.3 05/04/2021    LYMPHOPCT 39.0 05/04/2021    LYMPHOPCT 38.7 06/21/2017    MONOPCT 5.0 05/04/2021    BASOPCT 1.0 05/04/2021    MONOSABS 0.2 05/04/2021    LYMPHSABS 2.1 05/04/2021    EOSABS 0.0 05/04/2021    BASOSABS 0.0 05/04/2021     CMP:    Lab Results   Component Value Date     05/05/2021    K 4.8 05/05/2021     05/05/2021    CO2 24 05/05/2021    BUN 33 05/05/2021    CREATININE 1.9 05/05/2021    GFRAA 42 05/05/2021    GFRAA >60 06/14/2010    AGRATIO 1.1 01/22/2020    LABGLOM 35 05/05/2021    GLUCOSE 145 05/05/2021    GLUCOSE 370 02/22/2017    PROT 7.5 01/22/2020    PROT 8.3 02/22/2017    LABALBU 4.0 01/22/2020    CALCIUM 8.1 05/05/2021    BILITOT 0.6 01/22/2020    ALKPHOS 57 01/22/2020    AST 13 01/22/2020    ALT 9 01/22/2020       IMPRESSION/RECOMMENDATIONS:      1. I see no contraindication to starting Eliquis. His EGD and colonoscopy in the past showed GERD and benign colon polyps. His hemoccult is negative. He can follow up with Dr Melanie Stafford for outpatient endoscopy if he deems that necessary at this time.     Electronically signed by Valentin Cho MD on 5/5/2021 at 4:41 PM

## 2021-05-05 NOTE — PLAN OF CARE
Problem: Skin Integrity:  Goal: Will show no infection signs and symptoms  Description: Will show no infection signs and symptoms  Outcome: Ongoing     Problem: OXYGENATION/RESPIRATORY FUNCTION  Goal: Patient will maintain patent airway  Outcome: Ongoing     Problem: HEMODYNAMIC STATUS  Goal: Patient has stable vital signs and fluid balance  Outcome: Ongoing     Problem: FLUID AND ELECTROLYTE IMBALANCE  Goal: Fluid and electrolyte balance are achieved/maintained  Outcome: Ongoing     Problem: ACTIVITY INTOLERANCE/IMPAIRED MOBILITY  Goal: Mobility/activity is maintained at optimum level for patient  Outcome: Ongoing

## 2021-05-05 NOTE — PROGRESS NOTES
Hospitalist Progress Note      PCP: Isa Driscoll    Date of Admission: 5/3/2021    Chief Complaint: fluid overload    Hospital Course:      Subjective: Weaned to RA, feeling less bloated      Medications:  Reviewed    Infusion Medications    sodium chloride       Scheduled Medications    apixaban  5 mg Oral BID    aspirin  81 mg Oral Daily    atorvastatin  40 mg Oral Nightly    ferrous sulfate  324 mg Oral Daily with breakfast    metoprolol succinate  100 mg Oral Daily    spironolactone  25 mg Oral Daily    sodium chloride flush  5-40 mL Intravenous 2 times per day    furosemide  40 mg Intravenous BID     PRN Meds: sodium chloride flush, sodium chloride, promethazine **OR** ondansetron, polyethylene glycol, acetaminophen **OR** acetaminophen, perflutren lipid microspheres      Intake/Output Summary (Last 24 hours) at 5/5/2021 0853  Last data filed at 5/5/2021 0834  Gross per 24 hour   Intake 1440 ml   Output 2125 ml   Net -685 ml       Exam:    /65   Pulse 78   Temp 98.2 °F (36.8 °C) (Oral)   Resp 18   Ht 5' 9\" (1.753 m)   Wt 276 lb 10.8 oz (125.5 kg)   SpO2 93%   BMI 40.86 kg/m²     General appearance: No apparent distress, appears stated age and cooperative. HEENT: Pupils equal, round, and reactive to light. Conjunctivae/corneas clear. Neck: Supple, with full range of motion. No jugular venous distention. Trachea midline. Respiratory:  Normal respiratory effort. Clear to auscultation, bilaterally without Rales/Wheezes/Rhonchi. Cardiovascular: Regular rate and rhythm with normal S1/S2 without murmurs, rubs or gallops. Abdomen: Soft, non-tender, non-distended with normal bowel sounds. Musculoskeletal: No clubbing, cyanosis. Significant edema bilaterally in lower extremities. Full range of motion without deformity. Skin: Skin color, texture, turgor normal.  No rashes or lesions. Neurologic:  Neurovascularly intact without any focal sensory/motor deficits.  Cranial nerves: II-XII intact, grossly non-focal.  Psychiatric: Alert and oriented, thought content appropriate, normal insight  Capillary Refill: Brisk,< 3 seconds   Peripheral Pulses: +2 palpable, equal bilaterally       Labs:   Recent Labs     05/03/21  1745 05/04/21  0439   WBC 5.9 4.7   HGB 8.3* 7.9*   HCT 29.3* 26.9*   * 123*     Recent Labs     05/03/21  1745 05/04/21  0439 05/05/21  0526    135* 137   K 4.9 4.5 4.8    104 104   CO2 21 23 24   BUN 21* 24* 33*   CREATININE 1.9* 1.8* 1.9*   CALCIUM 8.9 8.0* 8.1*     No results for input(s): AST, ALT, BILIDIR, BILITOT, ALKPHOS in the last 72 hours. No results for input(s): INR in the last 72 hours. Recent Labs     05/03/21  1745 05/03/21  2300 05/04/21  0128   TROPONINI 0.04* 0.05* 0.04*       Urinalysis:      Lab Results   Component Value Date    NITRU Negative 01/22/2020    WBCUA 1 01/22/2020    BACTERIA 1+ 07/19/2017    RBCUA 2 01/22/2020    BLOODU SMALL 01/22/2020    SPECGRAV 1.021 01/22/2020    GLUCOSEU Negative 01/22/2020       Radiology:  CT CHEST WO CONTRAST   Final Result   Small bilateral pleural effusions have developed, along with ground-glass   opacities, pulmonary edema and atelectasis favored over pneumonia. No adenopathy is identified. Stable cardiomegaly. Stable splenomegaly. Anasarca, increased compared to baseline. XR CHEST PORTABLE   Final Result   Ill-defined pulmonary opacity seen in the right lung could represent   pulmonary edema or atypical pneumonia                 Assessment/Plan:    Active Hospital Problems    Diagnosis Date Noted    New onset atrial fibrillation ([facility] [address] [I48.91] 05/03/2021     Acute respiratory failure with hypoxia:  improved   86% RA on admission  Normally does not require oxygen therapy  2/2-> HF, & bilateral pleural effusions    Acute HF:   CXR and CT chest: stable cardiomegly with pleural effusions. HF RN consulted  ECHO in a.m.   Cardiology consulted:  Increased lasix to 80mg IV

## 2021-05-05 NOTE — CONSULTS
[facility]  HEART FAILURE PROGRAM      NAME:  Vanessa Cordova  MEDICAL RECORD NUMBER:  [id number]  AGE: 77 y.o.    GENDER: male  : 1945  TODAY'S DATE:  2021    Subjective:     VISIT TYPE: evaluation     ADMITTING PHYSICIAN:  Yi Avery DO    PAST MEDICAL HISTORY        Diagnosis Date    Anemia     Arthritis     Cancer ([facility] [address] 2005    hairy cell leukemia    Colon polyps 2010    Diabetes mellitus ([facility]     Hepatitis C     Obesity     Splenomegaly 2010       SOCIAL HISTORY    Social History     Tobacco Use    Smoking status: Former Smoker     Quit date: 1981     Years since quittin.2    Smokeless tobacco: Never Used   Substance Use Topics    Alcohol use: No     Alcohol/week: 0.0 standard drinks    Drug use: No       ALLERGIES    Allergies   Allergen Reactions    Oxycodone Rash     Pt has no problem with hydrocodone       MEDICATIONS  Scheduled Meds:   furosemide  80 mg Intravenous BID    iron sucrose  200 mg Intravenous Daily    insulin lispro  0-6 Units Subcutaneous TID WC    insulin lispro  0-3 Units Subcutaneous Nightly    insulin glargine  8 Units Subcutaneous Nightly    apixaban  5 mg Oral BID    aspirin  81 mg Oral Daily    atorvastatin  40 mg Oral Nightly    [Held by provider] ferrous sulfate  324 mg Oral Daily with breakfast    metoprolol succinate  100 mg Oral Daily    spironolactone  25 mg Oral Daily    sodium chloride flush  5-40 mL Intravenous 2 times per day       ADMIT DATE: 5/3/2021      Objective:     ADMISSION DIAGNOSIS:   New onset atrial fibrillation (HCC) [I48.91]     /75   Pulse 72   Temp 97.5 °F (36.4 °C) (Oral)   Resp 15   Ht 5' 9\" (1.753 m)   Wt 276 lb 10.8 oz (125.5 kg)   SpO2 91%   BMI 40.86 kg/m²     ADMIT:  Weight: 286 lb 9.6 oz (130 kg)    TODAY: Weight: 276 lb 10.8 oz (125.5 kg)    Wt Readings from Last 25 Encounters:   21 276 lb 10.8 oz (125.5 kg)   20 268 lb (121.6 kg)   20 274 lb (124.3 kg)   02/17/20 288 lb (130.6 kg)   01/22/20 288 lb 5.8 oz (130.8 kg)   09/20/19 289 lb (131.1 kg)   03/11/19 266 lb (120.7 kg)   01/08/19 261 lb (118.4 kg)   12/27/18 257 lb 4.4 oz (116.7 kg)   08/02/17 267 lb (121.1 kg)   07/19/17 266 lb (120.7 kg)   06/21/17 267 lb (121.1 kg)   02/22/17 266 lb (120.7 kg)   02/17/17 272 lb (123.4 kg)   08/31/16 280 lb 9.6 oz (127.3 kg)   04/20/16 277 lb (125.6 kg)   01/20/16 277 lb 9.6 oz (125.9 kg)   10/21/15 280 lb (127 kg)   08/12/15 280 lb 6.4 oz (127.2 kg)   06/11/15 281 lb (127.5 kg)   04/16/15 279 lb 3.2 oz (126.6 kg)   10/02/14 289 lb (131.1 kg)          Intake/Output Summary (Last 24 hours) at 5/5/2021 1535  Last data filed at 5/5/2021 1245  Gross per 24 hour   Intake 720 ml   Output 1775 ml   Net -1055 ml       LABS  BMP:   Lab Results   Component Value Date     05/05/2021    K 4.8 05/05/2021     05/05/2021    CO2 24 05/05/2021    BUN 33 05/05/2021    LABALBU 4.0 01/22/2020    CREATININE 1.9 05/05/2021    CALCIUM 8.1 05/05/2021    GFRAA 42 05/05/2021    GFRAA >60 06/14/2010    LABGLOM 35 05/05/2021    GLUCOSE 145 05/05/2021    GLUCOSE 370 02/22/2017     CBC:   Recent Labs     05/03/21  1745 05/04/21  0439   WBC 5.9 4.7   HGB 8.3* 7.9*   HCT 29.3* 26.9*   MCV 58.9* 59.1*   * 123*     BNP:   Lab Results   Component Value Date    BNP 15,199 05/03/2021       ECHOCARDIOGRAM:   5/4/21  Summary   Normal LV size with LVEF of 40%. Global dysfunction. Grade I diastolic   dysfunction with elevated L H filling pressures. Mild mitral regurgitation is present. The left atrium is moderately dilated. Trivial aortic regurgitation is present. The right ventricle is dilated. Right ventricular systolic function is   mildly reduced. Mild tricuspid regurgitation. The right atrium is moderately dilated.     Assessment:     CONSULTS:   IP CONSULT TO HOSPITALIST  IP CONSULT TO HEART FAILURE NURSE/COORDINATOR  IP CONSULT TO DIETITIAN  IP CONSULT TO CARDIOLOGY  IP CONSULT TO HEART FAILURE NURSE/COORDINATOR  IP CONSULT TO GI    Patient has a CARDIOLOGY CONSULT: Yes- follows with Dr Neville Burnett      Patient taking an ACEI/ARB:  No: EF > 40%       Patient taking a BETA BLOCKER:  Yes- toprol     SCALE AVAILABLE:  Yes     EDUCATION STATUS: Patient   [x]  Provided both written and verbal education on Heart Failure signs/symptoms. [x]  Provided instructions on daily medications. [x]  Provided instructions to monitor and record weight daily. [x]  Provided instructions to call if weight increases 3 lbs in one day or 5 lbs in one week. [x]  Received verbal acknowledgment/understanding of Heart Failure related causes. [x]  Provided instructions on how to maintain a low sodium diet. []  Provided recommendations for smoking cessation programs  [x]  Provided recommendations on activity and exercise      [x]  Other:    HF RN consulted, chart reviewed. Pt was sent from PCP office for c/o worsening SOB, 3+ edema, tachycardia, and was thought to be in new onset afib. However, after review of 12 lead it is consistent with ST. PCP did call Dr Steve Barnett office and notify him of admission. Cardiology was consulted. Pt's pBNP was 15K, and his exam was consistent with acute on chronic HF. Echo was repeated. EF 40% and a Grade 1 diastolic dysfunction. Pt has a h/o SHF and follows with Dr Neville Burnett as an outpt. Last seen 8/19/20, wt was 274 lbs at that time and his beta blocker was increased to 100 mg. Upon entering room, pt was up in the recliner with legs elevated. I introduced myself and my role in his care and he was agreeable to spend time with me. Pt is aware of his history of HF. Teaching done on 'What is HF', S&S of HF/ volume OL as well as S&S of dehydration. I briefly went over systolic and diastolic dysfunction. Upon questioning, he admits to not weighing himself but states \"I do have a scale and I guess I can start doing that. \" Teaching done on the importance of weighing himself daily, the '3/5 rule', HF Zones and who and when to call. I instructed pt to use the terms 'I have heart failure' and request a return call. If in red zone, pt was instructed to call 911. Verbalized understanding. Pt states he already met with the Dietician earlier today and his granddaughter who does his grocery shopping was present as well. \"Now that she knows I'm supposed to be on a low salt diet she'll make sure of it. \" I briefly went over the need to follow low Na diet as well as 2 liter FR. Pt states he typically drinks 3 to 4 bottles of water a day as well as 1 cup of coffee (in an approx 12 oz mug). Pt has been on these meds for some time now. He does not have any questions. I stressed the importance of compliance and taking his meds as prescribed. He gets his meds at [facility] in [facility] and denies any issues affording them. I asked pt is he snores to which he said yes. I introduced MELANI and stated his MDs might want him to have an outpt sleep study. Pt is an active  and states is independent in his care at home. He did get up and walk around the room some while I was present and appeared steady and safe. This HF RN will ensure pt has a hospital 7 day f/u appt in place prior to dc. CURRENT DIET: DIET LOW SODIUM 2 GM; 1500 ml    EDUCATIONAL PACKETS PROVIDED- PRINTED FROM [employer]. Titles and material given:  Yes   [x]  What is Heart Failure?   [x]  Heart Failure: Warning Signs of a Flare-Up  [x]  Heart Failure: Making Changes to Your Diet  [x]  Heart Failure: Medications to Help Your Heart   [x]  Other: [facility] HF education material and dietary handouts     PATIENT/CAREGIVER TEACHING:    Level of patient/caregiver understanding able to:   [x] Verbalize understanding   [] Demonstrate understanding       [] Teach back        [x] Needs reinforcement     []  Other:      TEACHING TIME:  25 minutes       Plan:       DISCHARGE PLAN:  Placement for patient upon discharge: home with support Hospice Care:  no  Code Status: Full Code  Discharge appointment scheduled: This writer will ensure pt has a hospital f/u appt in place      RECOMMENDATIONS:   [x]  Encourage to call [address] with any questions or concerns. [x]  Educate further Mr. Autumn Penn on fluid restriction 48 oz- 64 oz during inpatient stay so he can understand how to measure intake at home. [x]  Continue to educate on S/S of Heart Failure. [x]  Emphasize daily weights, diet, and if changes, to call Heart Failure Resource Line  [x]  Other: Declines Cardiac Rehab as EF of 40 and greater is not a covered dx at this time.            Electronically signed by Palmira Stover RN, BSN CHFN  on 5/5/2021 at 3:35 PM

## 2021-05-05 NOTE — PROGRESS NOTES
[facility]   Daily Progress Note      Admit Date:  5/3/2021    CC: \"  This is a 77 y.o. male went to see his primary caretaker for penile edema, lower extremity edema and shortness of breath. The nurse practitioner called the squad and sent him here. He mentions that she thought that he was in A. fib after doing an EKG in the office. However an EKG that I saw then the ER shows sinus tach     Subjective:  Pt with no acute overnight events. Denies chest pain, palpitations, a\. Patient still has shortness of breath and significant penile as well as lower extremity edema. He had modest diuresis with IV Lasix    Objective:   /65   Pulse 78   Temp 98.2 °F (36.8 °C) (Oral)   Resp 18   Ht 5' 9\" (1.753 m)   Wt 276 lb 10.8 oz (125.5 kg)   SpO2 93%   BMI 40.86 kg/m²       Intake/Output Summary (Last 24 hours) at 5/5/2021 1459  Last data filed at 5/5/2021 2789  Gross per 24 hour   Intake 1140 ml   Output 2125 ml   Net -985 ml     Wt Readings from Last 3 Encounters:   05/05/21 276 lb 10.8 oz (125.5 kg)   08/24/20 268 lb (121.6 kg)   08/19/20 274 lb (124.3 kg)     Telemetry:NSR with PACs    Physical Exam:  General:  NAD, Awake, alert and oriented X4  Skin:  Warm and dry  Neck:  Supple, elevated JVP appreciated, no bruit  Chest:  Clear to auscultation, no wheezes/rhonchi/rales  Cardiovascular:  Regular rate. S1S2  Abdomen:  Soft, nontender, +bowel sounds.   Significant penile and scrotal edema  Extremities: 3+ lateral LE edema    Cardiac Diagnosis:  diabetes, hypertension, hyperlipidemia and coronary artery disease    Medications:    apixaban  5 mg Oral BID    aspirin  81 mg Oral Daily    atorvastatin  40 mg Oral Nightly    ferrous sulfate  324 mg Oral Daily with breakfast    metoprolol succinate  100 mg Oral Daily    spironolactone  25 mg Oral Daily    sodium chloride flush  5-40 mL Intravenous 2 times per day    furosemide  40 mg Intravenous BID      sodium chloride       sodium chloride flush, sodium chloride, promethazine **OR** ondansetron, polyethylene glycol, acetaminophen **OR** acetaminophen, perflutren lipid microspheres    Lab Data:  CBC:   Recent Labs     05/03/21 1745 05/04/21 0439   WBC 5.9 4.7   HGB 8.3* 7.9*   * 123*     BMP:    Recent Labs     05/03/21 1745 05/04/21 0439 05/05/21  0526    135* 137   K 4.9 4.5 4.8   CO2 21 23 24   BUN 21* 24* 33*   CREATININE 1.9* 1.8* 1.9*     LIVR: No results for input(s): AST, ALT in the last 72 hours. INR:  No results for input(s): INR in the last 72 hours. APTT: No results for input(s): APTT in the last 72 hours. BNP:    Recent Labs     05/03/21 1745   BNP 15,199       Imaging:    Assessment:Plan:  1) Acute on chronic systolic/diastolic HF. NYHA class 3  -Patient had modest diuresis but still has significant lower extremity and penile and scrotal edema suggestive of significant fluid overload state  -We will increase Lasix to 80 mg IV twice daily but will have to keep a close eye on his renal functions  -We will encourage fluid and sodium restriction  -Consider sleep apnea evaluation as outpatient as part of patient's heart failure work-up    CAD  -Currently denies any symptoms of angina  -Continue beta-blocker aspirin and statin therapy    Cardiac arrhythmias  -Patient currently has sinus rhythm with frequent PACs no obvious A. fib noted  -And currently is started on Eliquis for possible A. fib  -      HTN  -Blood pressure is stable      Anemia  -Patient has significant iron deficiency anemia  -Since he is now started on Eliquis therapy suggest getting a GI work-up to make sure patient has no evidence of occult GI bleed may potentially worsen with Eliquis therapy    ?  Sleep apnea  -We will need sleep apnea evaluation as outpatient    Complexity of medical decision making acquiring hospitalization-very high      Electronically signed by Mirian Orourke MD on 5/5/2021 at 9:42 AM

## 2021-05-06 LAB
ANION GAP SERPL CALCULATED.3IONS-SCNC: 11 MMOL/L (ref 3–16)
BUN BLDV-MCNC: 36 MG/DL (ref 7–20)
CALCIUM SERPL-MCNC: 8.4 MG/DL (ref 8.3–10.6)
CHLORIDE BLD-SCNC: 102 MMOL/L (ref 99–110)
CO2: 25 MMOL/L (ref 21–32)
CREAT SERPL-MCNC: 1.9 MG/DL (ref 0.8–1.3)
GFR AFRICAN AMERICAN: 42
GFR NON-AFRICAN AMERICAN: 35
GLUCOSE BLD-MCNC: 112 MG/DL (ref 70–99)
GLUCOSE BLD-MCNC: 115 MG/DL (ref 70–99)
GLUCOSE BLD-MCNC: 149 MG/DL (ref 70–99)
GLUCOSE BLD-MCNC: 155 MG/DL (ref 70–99)
GLUCOSE BLD-MCNC: 195 MG/DL (ref 70–99)
MAGNESIUM: 2.1 MG/DL (ref 1.8–2.4)
PERFORMED ON: ABNORMAL
POTASSIUM SERPL-SCNC: 4.5 MMOL/L (ref 3.5–5.1)
PRO-BNP: ABNORMAL PG/ML (ref 0–449)
SODIUM BLD-SCNC: 138 MMOL/L (ref 136–145)

## 2021-05-06 PROCEDURE — 85025 COMPLETE CBC W/AUTO DIFF WBC: CPT

## 2021-05-06 PROCEDURE — 94761 N-INVAS EAR/PLS OXIMETRY MLT: CPT

## 2021-05-06 PROCEDURE — 6370000000 HC RX 637 (ALT 250 FOR IP): Performed by: NURSE PRACTITIONER

## 2021-05-06 PROCEDURE — 6360000002 HC RX W HCPCS: Performed by: FAMILY MEDICINE

## 2021-05-06 PROCEDURE — 99233 SBSQ HOSP IP/OBS HIGH 50: CPT | Performed by: INTERNAL MEDICINE

## 2021-05-06 PROCEDURE — 6370000000 HC RX 637 (ALT 250 FOR IP): Performed by: FAMILY MEDICINE

## 2021-05-06 PROCEDURE — 6360000002 HC RX W HCPCS: Performed by: INTERNAL MEDICINE

## 2021-05-06 PROCEDURE — 83880 ASSAY OF NATRIURETIC PEPTIDE: CPT

## 2021-05-06 PROCEDURE — 1200000000 HC SEMI PRIVATE

## 2021-05-06 PROCEDURE — 83735 ASSAY OF MAGNESIUM: CPT

## 2021-05-06 PROCEDURE — 36415 COLL VENOUS BLD VENIPUNCTURE: CPT

## 2021-05-06 PROCEDURE — 2580000003 HC RX 258: Performed by: NURSE PRACTITIONER

## 2021-05-06 PROCEDURE — 80048 BASIC METABOLIC PNL TOTAL CA: CPT

## 2021-05-06 RX ADMIN — FUROSEMIDE 80 MG: 10 INJECTION, SOLUTION INTRAMUSCULAR; INTRAVENOUS at 09:07

## 2021-05-06 RX ADMIN — Medication 10 ML: at 09:07

## 2021-05-06 RX ADMIN — Medication 10 ML: at 20:42

## 2021-05-06 RX ADMIN — METOPROLOL SUCCINATE 100 MG: 50 TABLET, EXTENDED RELEASE ORAL at 09:06

## 2021-05-06 RX ADMIN — FUROSEMIDE 80 MG: 10 INJECTION, SOLUTION INTRAMUSCULAR; INTRAVENOUS at 18:03

## 2021-05-06 RX ADMIN — APIXABAN 5 MG: 5 TABLET, FILM COATED ORAL at 09:07

## 2021-05-06 RX ADMIN — INSULIN GLARGINE 8 UNITS: 100 INJECTION, SOLUTION SUBCUTANEOUS at 20:42

## 2021-05-06 RX ADMIN — ATORVASTATIN CALCIUM 40 MG: 40 TABLET, FILM COATED ORAL at 20:41

## 2021-05-06 RX ADMIN — INSULIN LISPRO 1 UNITS: 100 INJECTION, SOLUTION INTRAVENOUS; SUBCUTANEOUS at 20:43

## 2021-05-06 RX ADMIN — ASPIRIN 81 MG: 81 TABLET, CHEWABLE ORAL at 09:06

## 2021-05-06 RX ADMIN — INSULIN LISPRO 1 UNITS: 100 INJECTION, SOLUTION INTRAVENOUS; SUBCUTANEOUS at 18:03

## 2021-05-06 RX ADMIN — SPIRONOLACTONE 25 MG: 25 TABLET ORAL at 09:07

## 2021-05-06 RX ADMIN — IRON SUCROSE 200 MG: 20 INJECTION, SOLUTION INTRAVENOUS at 09:07

## 2021-05-06 RX ADMIN — INSULIN LISPRO 1 UNITS: 100 INJECTION, SOLUTION INTRAVENOUS; SUBCUTANEOUS at 13:51

## 2021-05-06 ASSESSMENT — PAIN SCALES - GENERAL
PAINLEVEL_OUTOF10: 0
PAINLEVEL_OUTOF10: 0

## 2021-05-06 NOTE — PROGRESS NOTES
cyanosis. Significant edema bilaterally in lower extremities. Full range of motion without deformity. Skin: Skin color, texture, turgor normal.  No rashes or lesions. Neurologic:  Neurovascularly intact without any focal sensory/motor deficits. Cranial nerves: II-XII intact, grossly non-focal.  Psychiatric: Alert and oriented, thought content appropriate, normal insight  Capillary Refill: Brisk,< 3 seconds   Peripheral Pulses: +2 palpable, equal bilaterally       Labs:   Recent Labs     05/03/21  1745 05/04/21  0439 05/06/21  0452   WBC 5.9 4.7 4.8   HGB 8.3* 7.9* 8.7*   HCT 29.3* 26.9* 29.6*   * 123* 125*     Recent Labs     05/04/21  0439 05/05/21  0526 05/06/21  0450   * 137 138   K 4.5 4.8 4.5    104 102   CO2 23 24 25   BUN 24* 33* 36*   CREATININE 1.8* 1.9* 1.9*   CALCIUM 8.0* 8.1* 8.4     No results for input(s): AST, ALT, BILIDIR, BILITOT, ALKPHOS in the last 72 hours. No results for input(s): INR in the last 72 hours. Recent Labs     05/03/21  1745 05/03/21  2300 05/04/21  0128   TROPONINI 0.04* 0.05* 0.04*       Urinalysis:      Lab Results   Component Value Date    NITRU Negative 01/22/2020    WBCUA 1 01/22/2020    BACTERIA 1+ 07/19/2017    RBCUA 2 01/22/2020    BLOODU SMALL 01/22/2020    SPECGRAV 1.021 01/22/2020    GLUCOSEU Negative 01/22/2020       Radiology:  CT CHEST WO CONTRAST   Final Result   Small bilateral pleural effusions have developed, along with ground-glass   opacities, pulmonary edema and atelectasis favored over pneumonia. No adenopathy is identified. Stable cardiomegaly. Stable splenomegaly. Anasarca, increased compared to baseline.          XR CHEST PORTABLE   Final Result   Ill-defined pulmonary opacity seen in the right lung could represent   pulmonary edema or atypical pneumonia                 Assessment/Plan:    Active Hospital Problems    Diagnosis Date Noted    New onset atrial fibrillation ([facility] [address] [I48.91] 05/03/2021     Acute respiratory failure with hypoxia:  improved   86% RA on admission  Normally does not require oxygen therapy  2/2-> HF, & bilateral pleural effusions    Acute HF:   CXR and CT chest: stable cardiomegly with pleural effusions. HF RN consulted  ECHO in a.m.   Cardiology consulted:  Increased lasix to 80mg IV BID    New onset afib:? Paroxysmal   Uncertain if AF vs PAC's --  - metoprolol XR  -- Eliquis started-- benefits and risks of AC discussed with patient, patient consents   - however, cardiology less convinced patient has Afib, Eliquis discontinued    Likely MELANI:  - recommend outpatient sleep study     Acute on chronic anemia: ? Ckd, hairy cell leukemia  Chronic hypochromic microcytic  Iron deficient: Feo4: low: 20, Iron saturation: low: 7  Hemoccult negative   - IV iron x 3 days ordered  - GI consulted:  No contraindication to starting [facility]     HTN: Continue spironolactone and beta-blocker, ASA and statin therapy  DM: ,   currently not on diabetic regimen      Chronic thrombocytopenia: 124  Likely 2/2->Hairy cell leukemia     ANITA : BUN: 21, Cr: 1.9, GFR: 42  appears to have intermittent chronicity of elevated Cr and low GFR  Dec: 2018: Cr: 1.6, GFR: 52, Jan 2019: Cr: 1.7, GFR: 48  Hold ACE    DVT Prophylaxis: Eliquis  Diet: DIET LOW SODIUM 2 GM; 1500 ml  Code Status: Full Code    PT/OT Eval Status: N/A    Dispo - PCU    Lacy Borges MD

## 2021-05-06 NOTE — PLAN OF CARE
Problem: Skin Integrity:  Goal: Will show no infection signs and symptoms  Description: Will show no infection signs and symptoms  5/6/2021 0941 by Danuta Shaikh RN  Outcome: Ongoing     Problem: Skin Integrity:  Goal: Absence of new skin breakdown  Description: Absence of new skin breakdown  5/6/2021 0941 by Danuta Shaikh RN  Outcome: Ongoing     Problem: OXYGENATION/RESPIRATORY FUNCTION  Goal: Patient will maintain patent airway  5/6/2021 0941 by Danuta Shaikh RN  Outcome: Ongoing     Problem: OXYGENATION/RESPIRATORY FUNCTION  Goal: Patient will achieve/maintain normal respiratory rate/effort  Description: Respiratory rate and effort will be within normal limits for the patient  5/6/2021 0941 by Danuta Shaikh RN  Outcome: Ongoing     Problem: HEMODYNAMIC STATUS  Goal: Patient has stable vital signs and fluid balance  5/6/2021 0941 by Danuta Shaikh RN  Outcome: Ongoing     Problem: FLUID AND ELECTROLYTE IMBALANCE  Goal: Fluid and electrolyte balance are achieved/maintained  5/6/2021 0941 by Danuta Shaikh RN  Outcome: Ongoing     Problem: ACTIVITY INTOLERANCE/IMPAIRED MOBILITY  Goal: Mobility/activity is maintained at optimum level for patient  5/6/2021 0941 by Danuta Shaikh RN  Outcome: Ongoing

## 2021-05-06 NOTE — DISCHARGE INSTR - COC
Continuity of Care Form    Patient Name: Ike Srivastava   :  1945  MRN:  [id number]    Admit date:  5/3/2021  Discharge date:  ***    Code Status Order: Full Code   Advance Directives:   Advance Care Flowsheet Documentation     Date/Time Healthcare Directive Type of Healthcare Directive Copy in [address] Agent's Name Healthcare Agent's Phone Number    [phone]  No, patient does not have an advance directive for healthcare treatment -- -- -- -- --          Admitting Physician:  Mikey Wellington DO  PCP: Ky Irene    Discharging Nurse: [facility] Unit/Room#: N0I-6491/5126-01  Discharging Unit Phone Number: ***    Emergency Contact:   Extended Emergency Contact Information  Primary Emergency Contact: Ozzie Altman   [address] Phone: [phone]  Work Phone: [phone]  Relation: Child    Past Surgical History:  Past Surgical History:   Procedure Laterality Date    BONE MARROW BIOPSY      COLONOSCOPY      LIVER BIOPSY      TOTAL KNEE ARTHROPLASTY Left 2017       Immunization History: There is no immunization history on file for this patient.     Active Problems:  Patient Active Problem List   Diagnosis Code    Spleen enlargement R16.1    Hepatitis C B19.20    Colon polyps K63.5    Iron deficiency anemia D50.9    Hairy cell leukemia, in remission ([facility] [address] C91.41    Diabetes mellitus type 2 in obese ([facility] [address] E11.69, E66.9    Essential hypertension I10    Acute blood loss anemia D62    Acute pain of left knee M25.562    Primary osteoarthritis of left knee M17.12    STEMI (ST elevation myocardial infarction) (HCC) I21.3    New onset atrial fibrillation (HCC) I48.91       Isolation/Infection:   Isolation          No Isolation        Patient Infection Status     None to display          Nurse Assessment:  Last Vital Signs: /65   Pulse 80   Temp 97.4 °F (36.3 °C) (Oral)   Resp 20   Ht 5' 9\" (1.753 m)   Wt 272 lb 7.8 oz (123.6 kg)   SpO2 (!) 86%   BMI 40.24 kg/m²     Last documented pain score (0-10 scale): Pain Level: 0  Last Weight:   Wt Readings from Last 1 Encounters:   21 272 lb 7.8 oz (123.6 kg)     Mental Status:  {IP PT MENTAL STATUS:}    IV Access:  { ELVIRA IV ACCESS:122495721}    Nursing Mobility/ADLs:  Walking   {CHP DME FNVB:646517835}  Transfer  {CHP DME QIKJ:186398247}  Bathing  {CHP DME FNYZ:251050550}  Dressing  {CHP DME YGGW:047655417}  Toileting  {CHP DME SMWT:541223740}  Feeding  {CHP DME VZRY:208989651}  Med Admin  {CHP DME ZGKL:757501915}  Med Delivery   { ELVIRA MED Delivery:846620437}    Wound Care Documentation and Therapy:        Elimination:  Continence:   · Bowel: {YES / MD:60198}  · Bladder: {YES / TU:86629}  Urinary Catheter: {Urinary Catheter:680704189}   Colostomy/Ileostomy/Ileal Conduit: {YES / JE:99811}       Date of Last BM: ***    Intake/Output Summary (Last 24 hours) at 2021 1354  Last data filed at 2021 1353  Gross per 24 hour   Intake 360 ml   Output 3675 ml   Net -3315 ml     I/O last 3 completed shifts:   In: 600 [P.O.:600]  Out: 2700 [Urine:2700]    Safety Concerns:     [address] [employer] Safety Concerns:099351646}    Impairments/Disabilities:      [address] [employer] Impairments/Disabilities:299796697}    Nutrition Therapy:  Current Nutrition Therapy:   [employer] Diet List:576663488}    Routes of Feeding: {P DME Other Feedings:251286017}  Liquids: {Slp liquid thickness:75165}  Daily Fluid Restriction: {CHP DME Yes amt example:920452932}  Last Modified Barium Swallow with Video (Video Swallowing Test): {Done Not Done TWZO:071385647}    Treatments at the Time of Hospital Discharge:   Respiratory Treatments: ***  Oxygen Therapy:  {Therapy; copd oxygen:49469}  Ventilator:    {CHIN GASPAR Vent CDVB:454619678}    Rehab Therapies: {THERAPEUTIC INTERVENTION:2233232907}  Weight Bearing Status/Restrictions: {CHIN GASPAR Weight Bearin}  Other Medical Equipment (for information only, NOT a DME

## 2021-05-06 NOTE — CARE COORDINATION
Discharge Planning:  SW met with pt to discuss d/c plans. Pt plans to return home upon d/c. No d/c needs noted at this time. Medicare appeal rights given to pt. Pt verbalized understanding.   [place], [place]  [phone]  Electronically signed by Mei Cleveland on 5/6/2021 at 4:15 PM

## 2021-05-06 NOTE — PLAN OF CARE
Problem: Skin Integrity:  Goal: Will show no infection signs and symptoms  Description: Will show no infection signs and symptoms  Outcome: Ongoing     Problem: OXYGENATION/RESPIRATORY FUNCTION  Goal: Patient will achieve/maintain normal respiratory rate/effort  Description: Respiratory rate and effort will be within normal limits for the patient  Outcome: Ongoing     Problem: FLUID AND ELECTROLYTE IMBALANCE  Goal: Fluid and electrolyte balance are achieved/maintained  Outcome: Ongoing     Problem: ACTIVITY INTOLERANCE/IMPAIRED MOBILITY  Goal: Mobility/activity is maintained at optimum level for patient  Outcome: Ongoing

## 2021-05-06 NOTE — PROGRESS NOTES
[facility]  Cardiology Consult Note        CC:      Shortness of breath and lower extremity edema             HPI:   This is a 77 y.o. male went to see his primary caretaker for penile edema, lower extremity edema and shortness of breath. The nurse practitioner called the squad and sent him here. He mentions that she thought that he was in A. fib after doing an EKG in the office. However an EKG that I saw then the ER shows sinus tach    Patient states that he takes his torsemide daily but does miss out on other drugs. There is no change in his medications    Several years ago I had done an angioplasty on one of his diagonal branches his main vessels were fine he had markedly elevated left heart filling pressures at that time suggestive that he has chronic diastolic dysfunction    Interval history  On IV Lasix good diuresis      Past Medical History:   Diagnosis Date    Anemia     Arthritis     Cancer ([facility] [address] 2005    hairy cell leukemia    Colon polyps 2010    Diabetes mellitus ([facility] [address]     Hepatitis C     Obesity     Splenomegaly       Past Surgical History:   Procedure Laterality Date    BONE MARROW BIOPSY      COLONOSCOPY      LIVER BIOPSY      TOTAL KNEE ARTHROPLASTY Left 2017      No family history on file.    Social History     Tobacco Use    Smoking status: Former Smoker     Quit date: 1981     Years since quittin.2    Smokeless tobacco: Never Used   Substance Use Topics    Alcohol use: No     Alcohol/week: 0.0 standard drinks    Drug use: No     Allergies   Allergen Reactions    Oxycodone Rash     Pt has no problem with hydrocodone      furosemide  80 mg Intravenous BID    iron sucrose  200 mg Intravenous Daily    insulin lispro  0-6 Units Subcutaneous TID WC    insulin lispro  0-3 Units Subcutaneous Nightly    insulin glargine  8 Units Subcutaneous Nightly    apixaban  5 mg Oral BID    aspirin  81 mg Oral Daily    atorvastatin  40 mg Oral Nightly    lispro (HUMALOG) injection vial 0-6 Units, 0-6 Units, Subcutaneous, TID WC  insulin lispro (HUMALOG) injection vial 0-3 Units, 0-3 Units, Subcutaneous, Nightly  glucose (GLUTOSE) 40 % oral gel 15 g, 15 g, Oral, PRN  dextrose 50 % IV solution, 12.5 g, Intravenous, PRN  glucagon (rDNA) injection 1 mg, 1 mg, Intramuscular, PRN  dextrose 5 % solution, 100 mL/hr, Intravenous, PRN  insulin glargine (LANTUS) injection vial 8 Units, 8 Units, Subcutaneous, Nightly  apixaban (ELIQUIS) tablet 5 mg, 5 mg, Oral, BID  aspirin chewable tablet 81 mg, 81 mg, Oral, Daily  atorvastatin (LIPITOR) tablet 40 mg, 40 mg, Oral, Nightly  [Held by provider] ferrous sulfate EC tablet 324 mg, 324 mg, Oral, Daily with breakfast  metoprolol succinate (TOPROL XL) extended release tablet 100 mg, 100 mg, Oral, Daily  spironolactone (ALDACTONE) tablet 25 mg, 25 mg, Oral, Daily  sodium chloride flush 0.9 % injection 5-40 mL, 5-40 mL, Intravenous, 2 times per day  sodium chloride flush 0.9 % injection 5-40 mL, 5-40 mL, Intravenous, PRN  0.9 % sodium chloride infusion, 25 mL, Intravenous, PRN  promethazine (PHENERGAN) tablet 12.5 mg, 12.5 mg, Oral, Q6H PRN **OR** ondansetron (ZOFRAN) injection 4 mg, 4 mg, Intravenous, Q6H PRN  polyethylene glycol (GLYCOLAX) packet 17 g, 17 g, Oral, Daily PRN  acetaminophen (TYLENOL) tablet 650 mg, 650 mg, Oral, Q6H PRN **OR** acetaminophen (TYLENOL) suppository 650 mg, 650 mg, Rectal, Q6H PRN  perflutren lipid microspheres (DEFINITY) injection 1.65 mg, 1.5 mL, Intravenous, ONCE PRN      Labs:   Recent Labs     05/04/21  0439 05/06/21  0452   WBC 4.7 4.8   HGB 7.9* 8.7*   HCT 26.9* 29.6*   * 125*     Recent Labs     05/05/21  0526 05/06/21  0450    138   K 4.8 4.5   CO2 24 25   BUN 33* 36*   CREATININE 1.9* 1.9*   GLUCOSE 145* 112*     Recent Labs     05/03/21  1745   BNP 15,199     No results for input(s): PROTIME, INR in the last 72 hours. No results for input(s): APTT in the last 72 hours.   Recent Labs 05/03/21  1745 05/03/21  2300 05/04/21  0128   TROPONINI 0.04* 0.05* 0.04*     Lab Results   Component Value Date    HDL 18 05/04/2021    HDL 25 06/14/2010    LDLCALC 35 05/04/2021    TRIG 44 05/04/2021     No results for input(s): AST, ALT, LABALBU in the last 72 hours. EKG:   Sinus tachycardia    Chest X-Ray:  Questionable pulmonary edema    ECHO:5/4/2021   Normal LV size with LVEF of 40%. Global dysfunction. Grade I diastolic  dysfunction with elevated L H filling pressures. Mild mitral regurgitation is present. The left atrium is moderately dilated. Trivial aortic regurgitation is present. The right ventricle is dilated. Right ventricular systolic function is  mildly reduced. Mild tricuspid regurgitation. The right atrium is moderately dilated. Corornary angiogram  & Intervention:  1. There is branch vessel disease in this codominant circulation. The arteries are large. 2.  The left main is free of disease. 3.  LAD is a large vessel, it reaches the apex. The first diagonal has an 80% to 90% ostial lesion. The second diagonal is subtotally occluded nd is the culprit vessel. 4.  The left circumflex artery is free of disease. It is a codominant vessel. 5.  The codominant right also has no major obstructive disease. CONCLUSION:  1. Branch vessel coronary artery disease involving first and second diagonal.  The second diagonal is the culprit vessel with subtotal  occlusion. The first diagonal has an 80% ostial stenosis. 2.  Markedly elevated left heart filling pressures. 3.  Cardiomyopathy which is out of proportion to the coronary artery disease. CONCLUSION:  Successful PCI and stenting of subtotally occluded diagonal  branch. Lesion reduced from 100% to 0%. 2.5 x 23-mm JOHNSON was used.       ASSESSMENT AND PLAN:    Acute on Chronic diastolic heart failure  Shortness of breath lower extremity edema  Has history of diastolic dysfunction  Likely causes acute on chronic

## 2021-05-07 LAB
ACANTHOCYTES: ABNORMAL
ACANTHOCYTES: ABNORMAL
ANION GAP SERPL CALCULATED.3IONS-SCNC: 11 MMOL/L (ref 3–16)
ANISOCYTOSIS: ABNORMAL
ANISOCYTOSIS: ABNORMAL
ATYPICAL LYMPHOCYTE RELATIVE PERCENT: 3 % (ref 0–6)
ATYPICAL LYMPHOCYTE RELATIVE PERCENT: 4 % (ref 0–6)
BASOPHILS ABSOLUTE: 0 K/UL (ref 0–0.2)
BASOPHILS ABSOLUTE: 0 K/UL (ref 0–0.2)
BASOPHILS RELATIVE PERCENT: 0 %
BASOPHILS RELATIVE PERCENT: 1 %
BUN BLDV-MCNC: 35 MG/DL (ref 7–20)
CALCIUM SERPL-MCNC: 8.3 MG/DL (ref 8.3–10.6)
CHLORIDE BLD-SCNC: 100 MMOL/L (ref 99–110)
CO2: 26 MMOL/L (ref 21–32)
CREAT SERPL-MCNC: 1.9 MG/DL (ref 0.8–1.3)
EOSINOPHILS ABSOLUTE: 0 K/UL (ref 0–0.6)
EOSINOPHILS ABSOLUTE: 0.1 K/UL (ref 0–0.6)
EOSINOPHILS RELATIVE PERCENT: 1 %
EOSINOPHILS RELATIVE PERCENT: 2 %
GFR AFRICAN AMERICAN: 42
GFR NON-AFRICAN AMERICAN: 35
GLUCOSE BLD-MCNC: 131 MG/DL (ref 70–99)
GLUCOSE BLD-MCNC: 133 MG/DL (ref 70–99)
GLUCOSE BLD-MCNC: 194 MG/DL (ref 70–99)
GLUCOSE BLD-MCNC: 202 MG/DL (ref 70–99)
GLUCOSE BLD-MCNC: 240 MG/DL (ref 70–99)
HCT VFR BLD CALC: 29.4 % (ref 40.5–52.5)
HCT VFR BLD CALC: 29.6 % (ref 40.5–52.5)
HEMATOLOGY PATH CONSULT: NO
HEMATOLOGY PATH CONSULT: NO
HEMOGLOBIN: 8.6 G/DL (ref 13.5–17.5)
HEMOGLOBIN: 8.7 G/DL (ref 13.5–17.5)
HYPOCHROMIA: ABNORMAL
LYMPHOCYTES ABSOLUTE: 1.7 K/UL (ref 1–5.1)
LYMPHOCYTES ABSOLUTE: 2.1 K/UL (ref 1–5.1)
LYMPHOCYTES RELATIVE PERCENT: 31 %
LYMPHOCYTES RELATIVE PERCENT: 40 %
MAGNESIUM: 1.8 MG/DL (ref 1.8–2.4)
MCH RBC QN AUTO: 17 PG (ref 26–34)
MCH RBC QN AUTO: 17.1 PG (ref 26–34)
MCHC RBC AUTO-ENTMCNC: 29.1 G/DL (ref 31–36)
MCHC RBC AUTO-ENTMCNC: 29.4 G/DL (ref 31–36)
MCV RBC AUTO: 58 FL (ref 80–100)
MCV RBC AUTO: 58.5 FL (ref 80–100)
MICROCYTES: ABNORMAL
MICROCYTES: ABNORMAL
MONOCYTES ABSOLUTE: 0 K/UL (ref 0–1.3)
MONOCYTES ABSOLUTE: 0.1 K/UL (ref 0–1.3)
MONOCYTES RELATIVE PERCENT: 0 %
MONOCYTES RELATIVE PERCENT: 3 %
NEUTROPHILS ABSOLUTE: 2.6 K/UL (ref 1.7–7.7)
NEUTROPHILS ABSOLUTE: 2.9 K/UL (ref 1.7–7.7)
NEUTROPHILS RELATIVE PERCENT: 55 %
NEUTROPHILS RELATIVE PERCENT: 60 %
OVALOCYTES: ABNORMAL
OVALOCYTES: ABNORMAL
PDW BLD-RTO: 22.4 % (ref 12.4–15.4)
PDW BLD-RTO: 23 % (ref 12.4–15.4)
PERFORMED ON: ABNORMAL
PLATELET # BLD: 125 K/UL (ref 135–450)
PLATELET # BLD: 133 K/UL (ref 135–450)
PLATELET SLIDE REVIEW: ABNORMAL
PLATELET SLIDE REVIEW: ABNORMAL
PMV BLD AUTO: 7.9 FL (ref 5–10.5)
PMV BLD AUTO: 8.1 FL (ref 5–10.5)
POIKILOCYTES: ABNORMAL
POIKILOCYTES: ABNORMAL
POTASSIUM SERPL-SCNC: 4.3 MMOL/L (ref 3.5–5.1)
RBC # BLD: 5.03 M/UL (ref 4.2–5.9)
RBC # BLD: 5.1 M/UL (ref 4.2–5.9)
SCHISTOCYTES: ABNORMAL
SCHISTOCYTES: ABNORMAL
SLIDE REVIEW: ABNORMAL
SLIDE REVIEW: ABNORMAL
SODIUM BLD-SCNC: 137 MMOL/L (ref 136–145)
TEAR DROP CELLS: ABNORMAL
TEAR DROP CELLS: ABNORMAL
WBC # BLD: 4.8 K/UL (ref 4–11)
WBC # BLD: 4.8 K/UL (ref 4–11)

## 2021-05-07 PROCEDURE — 2580000003 HC RX 258: Performed by: NURSE PRACTITIONER

## 2021-05-07 PROCEDURE — 6360000002 HC RX W HCPCS: Performed by: INTERNAL MEDICINE

## 2021-05-07 PROCEDURE — 6370000000 HC RX 637 (ALT 250 FOR IP): Performed by: FAMILY MEDICINE

## 2021-05-07 PROCEDURE — 36415 COLL VENOUS BLD VENIPUNCTURE: CPT

## 2021-05-07 PROCEDURE — 83735 ASSAY OF MAGNESIUM: CPT

## 2021-05-07 PROCEDURE — 6370000000 HC RX 637 (ALT 250 FOR IP): Performed by: NURSE PRACTITIONER

## 2021-05-07 PROCEDURE — 1200000000 HC SEMI PRIVATE

## 2021-05-07 PROCEDURE — 2700000000 HC OXYGEN THERAPY PER DAY

## 2021-05-07 PROCEDURE — 6360000002 HC RX W HCPCS: Performed by: FAMILY MEDICINE

## 2021-05-07 PROCEDURE — 94760 N-INVAS EAR/PLS OXIMETRY 1: CPT

## 2021-05-07 PROCEDURE — 85025 COMPLETE CBC W/AUTO DIFF WBC: CPT

## 2021-05-07 PROCEDURE — 99233 SBSQ HOSP IP/OBS HIGH 50: CPT | Performed by: INTERNAL MEDICINE

## 2021-05-07 PROCEDURE — 80048 BASIC METABOLIC PNL TOTAL CA: CPT

## 2021-05-07 RX ADMIN — SPIRONOLACTONE 25 MG: 25 TABLET ORAL at 08:14

## 2021-05-07 RX ADMIN — FUROSEMIDE 80 MG: 10 INJECTION, SOLUTION INTRAMUSCULAR; INTRAVENOUS at 08:14

## 2021-05-07 RX ADMIN — IRON SUCROSE 200 MG: 20 INJECTION, SOLUTION INTRAVENOUS at 08:14

## 2021-05-07 RX ADMIN — METOPROLOL SUCCINATE 100 MG: 50 TABLET, EXTENDED RELEASE ORAL at 08:14

## 2021-05-07 RX ADMIN — INSULIN LISPRO 1 UNITS: 100 INJECTION, SOLUTION INTRAVENOUS; SUBCUTANEOUS at 21:04

## 2021-05-07 RX ADMIN — FUROSEMIDE 80 MG: 10 INJECTION, SOLUTION INTRAMUSCULAR; INTRAVENOUS at 17:35

## 2021-05-07 RX ADMIN — ASPIRIN 81 MG: 81 TABLET, CHEWABLE ORAL at 08:13

## 2021-05-07 RX ADMIN — Medication 10 ML: at 21:03

## 2021-05-07 RX ADMIN — INSULIN LISPRO 1 UNITS: 100 INJECTION, SOLUTION INTRAVENOUS; SUBCUTANEOUS at 17:35

## 2021-05-07 RX ADMIN — Medication 10 ML: at 08:14

## 2021-05-07 RX ADMIN — INSULIN LISPRO 2 UNITS: 100 INJECTION, SOLUTION INTRAVENOUS; SUBCUTANEOUS at 12:10

## 2021-05-07 RX ADMIN — INSULIN GLARGINE 8 UNITS: 100 INJECTION, SOLUTION SUBCUTANEOUS at 21:03

## 2021-05-07 RX ADMIN — ATORVASTATIN CALCIUM 40 MG: 40 TABLET, FILM COATED ORAL at 21:02

## 2021-05-07 ASSESSMENT — PAIN SCALES - GENERAL
PAINLEVEL_OUTOF10: 0
PAINLEVEL_OUTOF10: 0

## 2021-05-07 NOTE — PROGRESS NOTES
[address]  Cardiology Consult Note        CC:      Shortness of breath and lower extremity edema             HPI:   This is a 77 y.o. male went to see his primary caretaker for penile edema, lower extremity edema and shortness of breath. The nurse practitioner called the squad and sent him here. He mentions that she thought that he was in A. fib after doing an EKG in the office. However an EKG that I saw then the ER shows sinus tach    Patient states that he takes his torsemide daily but does miss out on other drugs. There is no change in his medications    Several years ago I had done an angioplasty on one of his diagonal branches his main vessels were fine he had markedly elevated left heart filling pressures at that time suggestive that he has chronic diastolic dysfunction    Interval history  On IV Lasix 80 mg IV twice daily  Fluid balance -7600    Past Medical History:   Diagnosis Date    Anemia     Arthritis     Cancer ([facility] [address]     hairy cell leukemia    Colon polyps 2010    Diabetes mellitus ([facility] [address]     Hepatitis C     Obesity     Splenomegaly       Past Surgical History:   Procedure Laterality Date    BONE MARROW BIOPSY      COLONOSCOPY      LIVER BIOPSY      TOTAL KNEE ARTHROPLASTY Left 2017      No family history on file.    Social History     Tobacco Use    Smoking status: Former Smoker     Quit date: 1981     Years since quittin.2    Smokeless tobacco: Never Used   Substance Use Topics    Alcohol use: No     Alcohol/week: 0.0 standard drinks    Drug use: No     Allergies   Allergen Reactions    Oxycodone Rash     Pt has no problem with hydrocodone      furosemide  80 mg Intravenous BID    insulin lispro  0-6 Units Subcutaneous TID WC    insulin lispro  0-3 Units Subcutaneous Nightly    insulin glargine  8 Units Subcutaneous Nightly    aspirin  81 mg Oral Daily    atorvastatin  40 mg Oral Nightly    [Held by provider] ferrous sulfate  324 mg Oral Daily with breakfast    metoprolol succinate  100 mg Oral Daily    spironolactone  25 mg Oral Daily    sodium chloride flush  5-40 mL Intravenous 2 times per day       Review of Systems -   Constitutional: Negative for weight gain/loss; malaise, fever  Respiratory: Negative for Asthma;  cough and hemoptysis  Cardiovascular: Negative for palpitations,dizziness   Gastrointestinal: Negative for abd.pain; constipation/diarrhea;    Genitourinary: Negative for stones; hematuria; frequency hesitancy  Integumentt: Negative for rash or pruritis  Hematologic/lymphatic: Negative for blood dyscrasia; leukemia/lymphoma  Musculoskeletal: Negative for Connective tissue disease  Neurological:  Negative for Seizure   Behavioral/Psych:Negative for Bipolar disorder, Schizophrenia; Dementia  Endocrine: negative for thyroid, parathyroid disease      Intake/Output Summary (Last 24 hours) at 5/7/2021 0836  Last data filed at 5/7/2021 0820  Gross per 24 hour   Intake 360 ml   Output 4025 ml   Net -3665 ml       Physical Examination:  /75   Pulse 80   Temp 98.3 °F (36.8 °C) (Oral)   Resp 16   Ht 5' 9\" (1.753 m)   Wt 264 lb 8.8 oz (120 kg)   SpO2 94%   BMI 39.07 kg/m²    HEENT:  Face: Atraumatic, Conjunctiva: Pink; non icteric,  Mucous Memb:  Moist, No thyromegaly or Lymphadenopathy  Respiratory:  Resp Assessment: normal, Resp Auscultation: clear   Cardiovascular: Auscultation: nl S1 & S2, Palpation:  Nl PMI;  No heaves or thrills, JVP:  normal  Abdomen: Soft, non-tender, Normal bowel sounds,  No organomegaly  Extremities: No Cyanosis or Clubbing; Edema none  Neurological: Oriented to time, place, and person, Non-anxious  Psychiatric: Normal mood and affect  Skin: Warm and dry,  No rash seen      Current Facility-Administered Medications: furosemide (LASIX) injection 80 mg, 80 mg, Intravenous, BID  insulin lispro (HUMALOG) injection vial 0-6 Units, 0-6 Units, Subcutaneous, TID WC  insulin lispro (HUMALOG) injection vial 0-3 Units, 0-3 Units, Subcutaneous, Nightly  glucose (GLUTOSE) 40 % oral gel 15 g, 15 g, Oral, PRN  dextrose 50 % IV solution, 12.5 g, Intravenous, PRN  glucagon (rDNA) injection 1 mg, 1 mg, Intramuscular, PRN  dextrose 5 % solution, 100 mL/hr, Intravenous, PRN  insulin glargine (LANTUS) injection vial 8 Units, 8 Units, Subcutaneous, Nightly  aspirin chewable tablet 81 mg, 81 mg, Oral, Daily  atorvastatin (LIPITOR) tablet 40 mg, 40 mg, Oral, Nightly  [Held by provider] ferrous sulfate EC tablet 324 mg, 324 mg, Oral, Daily with breakfast  metoprolol succinate (TOPROL XL) extended release tablet 100 mg, 100 mg, Oral, Daily  spironolactone (ALDACTONE) tablet 25 mg, 25 mg, Oral, Daily  sodium chloride flush 0.9 % injection 5-40 mL, 5-40 mL, Intravenous, 2 times per day  sodium chloride flush 0.9 % injection 5-40 mL, 5-40 mL, Intravenous, PRN  0.9 % sodium chloride infusion, 25 mL, Intravenous, PRN  promethazine (PHENERGAN) tablet 12.5 mg, 12.5 mg, Oral, Q6H PRN **OR** ondansetron (ZOFRAN) injection 4 mg, 4 mg, Intravenous, Q6H PRN  polyethylene glycol (GLYCOLAX) packet 17 g, 17 g, Oral, Daily PRN  acetaminophen (TYLENOL) tablet 650 mg, 650 mg, Oral, Q6H PRN **OR** acetaminophen (TYLENOL) suppository 650 mg, 650 mg, Rectal, Q6H PRN  perflutren lipid microspheres (DEFINITY) injection 1.65 mg, 1.5 mL, Intravenous, ONCE PRN      Labs:   Recent Labs     05/06/21 0452 05/07/21  0521   WBC 4.8 4.8   HGB 8.7* 8.6*   HCT 29.6* 29.4*   * 133*     Recent Labs     05/06/21  0450 05/07/21  0521    137   K 4.5 4.3   CO2 25 26   BUN 36* 35*   CREATININE 1.9* 1.9*   GLUCOSE 112* 131*     No results for input(s): BNP in the last 72 hours. No results for input(s): PROTIME, INR in the last 72 hours. No results for input(s): APTT in the last 72 hours. No results for input(s): CKTOTAL, CKMB, CKMBINDEX, TROPONINI in the last 72 hours.   Lab Results   Component Value Date    HDL 18 05/04/2021    HDL 25 06/14/2010    LDLCALC 35 05/04/2021    TRIG 44 05/04/2021     No results for input(s): AST, ALT, LABALBU in the last 72 hours. EKG:   Sinus tachycardia    Chest X-Ray:  Questionable pulmonary edema    ECHO:5/4/2021   Normal LV size with LVEF of 40%. Global dysfunction. Grade I diastolic  dysfunction with elevated L H filling pressures. Mild mitral regurgitation is present. The left atrium is moderately dilated. Trivial aortic regurgitation is present. The right ventricle is dilated. Right ventricular systolic function is  mildly reduced. Mild tricuspid regurgitation. The right atrium is moderately dilated. Corornary angiogram  & Intervention:  1. There is branch vessel disease in this codominant circulation. The arteries are large. 2.  The left main is free of disease. 3.  LAD is a large vessel, it reaches the apex. The first diagonal has an 80% to 90% ostial lesion. The second diagonal is subtotally occluded nd is the culprit vessel. 4.  The left circumflex artery is free of disease. It is a codominant vessel. 5.  The codominant right also has no major obstructive disease. CONCLUSION:  1. Branch vessel coronary artery disease involving first and second diagonal.  The second diagonal is the culprit vessel with subtotal  occlusion. The first diagonal has an 80% ostial stenosis. 2.  Markedly elevated left heart filling pressures. 3.  Cardiomyopathy which is out of proportion to the coronary artery disease. CONCLUSION:  Successful PCI and stenting of subtotally occluded diagonal  branch. Lesion reduced from 100% to 0%. 2.5 x 23-mm JOHNSON was used.       ASSESSMENT AND PLAN:    Acute on Chronic diastolic heart failure  Fluid Balance  -7600  Creatinine remains at 1.9    Going home would recommend torsemide 20 mg twice daily      No Atrial Fib seen so far  Cannot justify Emperic eliquis use  Will DC       Will sign off  Do recommend a 30-day event monitor to rule out paroxysmal atrial fibrillation  I can arrange for that when he comes to visit me in follow-up in few weeks time    Rosy Muse M.D  5/7/2021

## 2021-05-07 NOTE — PLAN OF CARE
Problem: Skin Integrity:  Goal: Will show no infection signs and symptoms  Description: Will show no infection signs and symptoms  Outcome: Ongoing     Problem: OXYGENATION/RESPIRATORY FUNCTION  Goal: Patient will maintain patent airway  Outcome: Ongoing     Problem: OXYGENATION/RESPIRATORY FUNCTION  Goal: Patient will achieve/maintain normal respiratory rate/effort  Description: Respiratory rate and effort will be within normal limits for the patient  Outcome: Ongoing

## 2021-05-07 NOTE — PROGRESS NOTES
Hospitalist Progress Note      PCP: Katy Ortiz    Date of Admission: 5/3/2021    Chief Complaint: fluid overload    Hospital Course:      Subjective:  Requiring 1L oxyen. Diuresing well. No new issues      Medications:  Reviewed    Infusion Medications    dextrose      sodium chloride       Scheduled Medications    furosemide  80 mg Intravenous BID    insulin lispro  0-6 Units Subcutaneous TID WC    insulin lispro  0-3 Units Subcutaneous Nightly    insulin glargine  8 Units Subcutaneous Nightly    aspirin  81 mg Oral Daily    atorvastatin  40 mg Oral Nightly    [Held by provider] ferrous sulfate  324 mg Oral Daily with breakfast    metoprolol succinate  100 mg Oral Daily    spironolactone  25 mg Oral Daily    sodium chloride flush  5-40 mL Intravenous 2 times per day     PRN Meds: glucose, dextrose, glucagon (rDNA), dextrose, sodium chloride flush, sodium chloride, promethazine **OR** ondansetron, polyethylene glycol, acetaminophen **OR** acetaminophen, perflutren lipid microspheres      Intake/Output Summary (Last 24 hours) at 5/7/2021 0931  Last data filed at 5/7/2021 0820  Gross per 24 hour   Intake 120 ml   Output 4025 ml   Net -3905 ml       Exam:    /75   Pulse 80   Temp 98.3 °F (36.8 °C) (Oral)   Resp 16   Ht 5' 9\" (1.753 m)   Wt 264 lb 8.8 oz (120 kg)   SpO2 94%   BMI 39.07 kg/m²     General appearance: No apparent distress, appears stated age and cooperative. HEENT: Pupils equal, round, and reactive to light. Conjunctivae/corneas clear. Neck: Supple, with full range of motion. No jugular venous distention. Trachea midline. Respiratory:  Normal respiratory effort. Clear to auscultation, bilaterally without Rales/Wheezes/Rhonchi. Cardiovascular: Regular rate and rhythm with normal S1/S2 without murmurs, rubs or gallops. Abdomen: Soft, non-tender, non-distended with normal bowel sounds. Musculoskeletal: No clubbing, cyanosis.   Significant edema bilaterally in lower extremities. Full range of motion without deformity. Skin: Skin color, texture, turgor normal.  No rashes or lesions. Neurologic:  Neurovascularly intact without any focal sensory/motor deficits. Cranial nerves: II-XII intact, grossly non-focal.  Psychiatric: Alert and oriented, thought content appropriate, normal insight  Capillary Refill: Brisk,< 3 seconds   Peripheral Pulses: +2 palpable, equal bilaterally       Labs:   Recent Labs     05/06/21  0452 05/07/21  0521   WBC 4.8 4.8   HGB 8.7* 8.6*   HCT 29.6* 29.4*   * 133*     Recent Labs     05/05/21  0526 05/06/21  0450 05/07/21  0521    138 137   K 4.8 4.5 4.3    102 100   CO2 24 25 26   BUN 33* 36* 35*   CREATININE 1.9* 1.9* 1.9*   CALCIUM 8.1* 8.4 8.3     No results for input(s): AST, ALT, BILIDIR, BILITOT, ALKPHOS in the last 72 hours. No results for input(s): INR in the last 72 hours. No results for input(s): Ethelene Soulier in the last 72 hours. Urinalysis:      Lab Results   Component Value Date    NITRU Negative 01/22/2020    WBCUA 1 01/22/2020    BACTERIA 1+ 07/19/2017    RBCUA 2 01/22/2020    BLOODU SMALL 01/22/2020    SPECGRAV 1.021 01/22/2020    GLUCOSEU Negative 01/22/2020       Radiology:  CT CHEST WO CONTRAST   Final Result   Small bilateral pleural effusions have developed, along with ground-glass   opacities, pulmonary edema and atelectasis favored over pneumonia. No adenopathy is identified. Stable cardiomegaly. Stable splenomegaly. Anasarca, increased compared to baseline.          XR CHEST PORTABLE   Final Result   Ill-defined pulmonary opacity seen in the right lung could represent   pulmonary edema or atypical pneumonia                 Assessment/Plan:    Active Hospital Problems    Diagnosis Date Noted    New onset atrial fibrillation ([facility] [address] [I48.91] 05/03/2021     Acute respiratory failure with hypoxia:  improved   86% RA on admission  Normally does not require oxygen therapy  2/2-> HF, & bilateral pleural effusions    Acute HF:   CXR and CT chest: stable cardiomegly with pleural effusions. HF RN consulted  ECHO in a.m.   Cardiology consulted:  Increased lasix to 80mg IV BID    New onset afib:? Paroxysmal   Uncertain if AF vs PAC's --  - metoprolol XR  -- Eliquis started-- benefits and risks of AC discussed with patient, patient consents   - however, cardiology less convinced patient has Afib, Eliquis discontinued    Likely MELANI:  - recommend outpatient sleep study     Acute on chronic anemia: ? Ckd, hairy cell leukemia  Chronic hypochromic microcytic  Iron deficient: Feo4: low: 20, Iron saturation: low: 7  Hemoccult negative   - IV iron x 3 days ordered  - GI consulted:  No contraindication to starting [facility]     HTN: Continue spironolactone and beta-blocker, ASA and statin therapy  DM: ,   currently not on diabetic regimen      Chronic thrombocytopenia: 124  Likely 2/2->Hairy cell leukemia     ANITA : BUN: 21, Cr: 1.9, GFR: 42  appears to have intermittent chronicity of elevated Cr and low GFR  Dec: 2018: Cr: 1.6, GFR: 52, Jan 2019: Cr: 1.7, GFR: 48  Hold ACE    DVT Prophylaxis: Eliquis  Diet: DIET LOW SODIUM 2 GM; 1500 ml  Code Status: Full Code    PT/OT Eval Status: N/A    Dispo - PCU, could D/C tomorrow if off Jessica Baker MD

## 2021-05-08 VITALS
WEIGHT: 255.73 LBS | BODY MASS INDEX: 37.88 KG/M2 | SYSTOLIC BLOOD PRESSURE: 120 MMHG | OXYGEN SATURATION: 95 % | HEART RATE: 86 BPM | TEMPERATURE: 98.2 F | DIASTOLIC BLOOD PRESSURE: 70 MMHG | RESPIRATION RATE: 18 BRPM | HEIGHT: 69 IN

## 2021-05-08 LAB
ACANTHOCYTES: ABNORMAL
ANION GAP SERPL CALCULATED.3IONS-SCNC: 9 MMOL/L (ref 3–16)
ANISOCYTOSIS: ABNORMAL
ATYPICAL LYMPHOCYTE RELATIVE PERCENT: 3 % (ref 0–6)
BASOPHILS ABSOLUTE: 0.1 K/UL (ref 0–0.2)
BASOPHILS RELATIVE PERCENT: 2 %
BUN BLDV-MCNC: 34 MG/DL (ref 7–20)
CALCIUM SERPL-MCNC: 8.9 MG/DL (ref 8.3–10.6)
CHLORIDE BLD-SCNC: 99 MMOL/L (ref 99–110)
CO2: 30 MMOL/L (ref 21–32)
CREAT SERPL-MCNC: 1.9 MG/DL (ref 0.8–1.3)
EOSINOPHILS ABSOLUTE: 0.2 K/UL (ref 0–0.6)
EOSINOPHILS RELATIVE PERCENT: 3 %
GFR AFRICAN AMERICAN: 42
GFR NON-AFRICAN AMERICAN: 35
GLUCOSE BLD-MCNC: 151 MG/DL (ref 70–99)
GLUCOSE BLD-MCNC: 161 MG/DL (ref 70–99)
GLUCOSE BLD-MCNC: 187 MG/DL (ref 70–99)
GLUCOSE BLD-MCNC: 300 MG/DL (ref 70–99)
HAIRY CELLS: PRESENT
HCT VFR BLD CALC: 30.2 % (ref 40.5–52.5)
HEMATOLOGY PATH CONSULT: NO
HEMOGLOBIN: 8.7 G/DL (ref 13.5–17.5)
HYPOCHROMIA: ABNORMAL
LYMPHOCYTES ABSOLUTE: 1.9 K/UL (ref 1–5.1)
LYMPHOCYTES RELATIVE PERCENT: 31 %
MAGNESIUM: 1.8 MG/DL (ref 1.8–2.4)
MCH RBC QN AUTO: 16.9 PG (ref 26–34)
MCHC RBC AUTO-ENTMCNC: 28.8 G/DL (ref 31–36)
MCV RBC AUTO: 58.7 FL (ref 80–100)
MICROCYTES: ABNORMAL
MONOCYTES ABSOLUTE: 0.2 K/UL (ref 0–1.3)
MONOCYTES RELATIVE PERCENT: 3 %
NEUTROPHILS ABSOLUTE: 3.2 K/UL (ref 1.7–7.7)
NEUTROPHILS RELATIVE PERCENT: 58 %
OVALOCYTES: ABNORMAL
PDW BLD-RTO: 22.9 % (ref 12.4–15.4)
PERFORMED ON: ABNORMAL
PLATELET # BLD: 152 K/UL (ref 135–450)
PLATELET SLIDE REVIEW: ABNORMAL
PMV BLD AUTO: 8.2 FL (ref 5–10.5)
POIKILOCYTES: ABNORMAL
POLYCHROMASIA: ABNORMAL
POTASSIUM SERPL-SCNC: 4.1 MMOL/L (ref 3.5–5.1)
RBC # BLD: 5.15 M/UL (ref 4.2–5.9)
SCHISTOCYTES: ABNORMAL
SLIDE REVIEW: ABNORMAL
SODIUM BLD-SCNC: 138 MMOL/L (ref 136–145)
TEAR DROP CELLS: ABNORMAL
WBC # BLD: 5.5 K/UL (ref 4–11)

## 2021-05-08 PROCEDURE — 85025 COMPLETE CBC W/AUTO DIFF WBC: CPT

## 2021-05-08 PROCEDURE — 83735 ASSAY OF MAGNESIUM: CPT

## 2021-05-08 PROCEDURE — 99232 SBSQ HOSP IP/OBS MODERATE 35: CPT | Performed by: NURSE PRACTITIONER

## 2021-05-08 PROCEDURE — 6370000000 HC RX 637 (ALT 250 FOR IP): Performed by: FAMILY MEDICINE

## 2021-05-08 PROCEDURE — 6360000002 HC RX W HCPCS: Performed by: INTERNAL MEDICINE

## 2021-05-08 PROCEDURE — 80048 BASIC METABOLIC PNL TOTAL CA: CPT

## 2021-05-08 PROCEDURE — 36415 COLL VENOUS BLD VENIPUNCTURE: CPT

## 2021-05-08 PROCEDURE — 2580000003 HC RX 258: Performed by: NURSE PRACTITIONER

## 2021-05-08 PROCEDURE — 6370000000 HC RX 637 (ALT 250 FOR IP): Performed by: NURSE PRACTITIONER

## 2021-05-08 RX ORDER — FERROUS SULFATE 325(65) MG
325 TABLET ORAL
Qty: 30 TABLET | Refills: 3 | Status: SHIPPED | OUTPATIENT
Start: 2021-05-08

## 2021-05-08 RX ORDER — SPIRONOLACTONE 25 MG/1
25 TABLET ORAL DAILY
Qty: 30 TABLET | Refills: 3 | Status: SHIPPED | OUTPATIENT
Start: 2021-05-09 | End: 2021-06-04

## 2021-05-08 RX ORDER — TORSEMIDE 20 MG/1
20 TABLET ORAL 2 TIMES DAILY
Qty: 30 TABLET | Refills: 5 | Status: SHIPPED | OUTPATIENT
Start: 2021-05-08 | End: 2022-10-13

## 2021-05-08 RX ADMIN — FUROSEMIDE 80 MG: 10 INJECTION, SOLUTION INTRAMUSCULAR; INTRAVENOUS at 09:16

## 2021-05-08 RX ADMIN — Medication 10 ML: at 09:16

## 2021-05-08 RX ADMIN — INSULIN LISPRO 1 UNITS: 100 INJECTION, SOLUTION INTRAVENOUS; SUBCUTANEOUS at 09:13

## 2021-05-08 RX ADMIN — SPIRONOLACTONE 25 MG: 25 TABLET ORAL at 09:16

## 2021-05-08 RX ADMIN — METOPROLOL SUCCINATE 100 MG: 50 TABLET, EXTENDED RELEASE ORAL at 09:15

## 2021-05-08 RX ADMIN — ASPIRIN 81 MG: 81 TABLET, CHEWABLE ORAL at 09:15

## 2021-05-08 NOTE — DISCHARGE SUMMARY
Hospital Medicine Discharge Summary    Patient ID: Nicole Gonzalez      Patient's PCP: Vahe Ac    Admit Date: 5/3/2021     Discharge Date:   5/8/2021    Admitting Physician: Dayne Mcneill DO     Discharge Physician: Steven Wheatley MD     Discharge Diagnoses: Active Hospital Problems    Diagnosis Date Noted    New onset atrial fibrillation [facility]) [I48.91] 05/03/2021       The patient was seen and examined on day of discharge and this discharge summary is in conjunction with any daily progress note from day of discharge. Hospital Course:     Acute respiratory failure with hypoxia:  improved   86% RA on admission  Normally does not require oxygen therapy  2/2-> HF, & bilateral pleural effusions     Acute HF:   CXR and CT chest: stable cardiomegly with pleural effusions. HF RN consulted  ECHO in a.m.   Cardiology consulted:  Increased lasix to 80mg IV BID   - discharging on torsemid 2mg BID and spironolactone 25mg daily     New onset afib:? Paroxysmal   Uncertain if AF vs PAC's --  - metoprolol XR  -- Eliquis started-- benefits and risks of AC discussed with patient, patient consents              - however, cardiology less convinced patient has Afib, Eliquis discontinued     Likely MELANI:  - recommend outpatient sleep study     Acute on chronic anemia: ? Ckd, hairy cell leukemia  Chronic hypochromic microcytic  Iron deficient: Feo4: low: 20, Iron saturation: low: 7  Hemoccult negative   - IV iron x 3 days ordered     HTN: Continue spironolactone and beta-blocker, ASA and statin therapy  DM: ,   currently not on diabetic regimen      Chronic thrombocytopenia: 124  Likely 2/2->Hairy cell leukemia     ANITA : BUN: 21, Cr: 1.9, GFR: 42  appears to have intermittent chronicity of elevated Cr and low GFR  Dec: 2018: Cr: 1.6, GFR: 52, Jan 2019: Cr: 1.7, GFR: 48  Hold ACE      Exam:     /70   Pulse 81   Temp 98.1 °F (36.7 °C) (Oral)   Resp 18   Ht 5' 9\" (1.753 m)   Wt 255 lb 11.7 oz (116 kg)   SpO2 93%   BMI 37.77 kg/m²     General appearance: No apparent distress, appears stated age and cooperative. HEENT: Pupils equal, round, and reactive to light. Conjunctivae/corneas clear. Neck: Supple, with full range of motion. No jugular venous distention. Trachea midline. Respiratory:  Normal respiratory effort. Clear to auscultation, bilaterally without Rales/Wheezes/Rhonchi. Cardiovascular: Regular rate and rhythm with normal S1/S2 without murmurs, rubs or gallops. Abdomen: Soft, non-tender, non-distended with normal bowel sounds. Musculoskelatal: No clubbing, cyanosis or edema bilaterally. Full range of motion without deformity. Skin: Skin color, texture, turgor normal.  No rashes or lesions. Neurologic:  Neurovascularly intact without any focal sensory/motor deficits.  Cranial nerves: II-XII intact, grossly non-focal.  Psychiatric: Alert and oriented, thought content appropriate, normal insight      Consults:     IP CONSULT TO HOSPITALIST  IP CONSULT TO HEART FAILURE NURSE/COORDINATOR  IP CONSULT TO DIETITIAN  IP CONSULT TO CARDIOLOGY  IP CONSULT TO HEART FAILURE NURSE/COORDINATOR  IP CONSULT TO GI    Significant Diagnostic Studies:          Radiology:  CT CHEST WO CONTRAST   Final Result   Small bilateral pleural effusions have developed, along with ground-glass   opacities, pulmonary edema and atelectasis favored over pneumonia.       No adenopathy is identified.       Stable cardiomegaly.       Stable splenomegaly.       Anasarca, increased compared to baseline.           XR CHEST PORTABLE   Final Result   Ill-defined pulmonary opacity seen in the right lung could represent   pulmonary edema or atypical pneumonia             PCP/SNF to follow up: F/u heart failure management, lifestyle modification    Disposition:  Home    Condition on D/C:  Stable     Discharge Instructions/Follow-up:  F/u with PCP within 1 week and cardiology within 2-4 weeks    Code Status:  Full Code     Activity: activity as tolerated    Diet: cardiac diet    Labs: For convenience and continuity at follow-up the following most recent labs are provided:      CBC:    Lab Results   Component Value Date    WBC 5.5 05/08/2021    HGB 8.7 05/08/2021    HCT 30.2 05/08/2021     05/08/2021       Renal:    Lab Results   Component Value Date     05/08/2021    K 4.1 05/08/2021    CL 99 05/08/2021    CO2 30 05/08/2021    BUN 34 05/08/2021    CREATININE 1.9 05/08/2021    CALCIUM 8.9 05/08/2021    PHOS 3.8 01/08/2019       Discharge Medications:     Current Discharge Medication List           Details   spironolactone (ALDACTONE) 25 MG tablet Take 1 tablet by mouth daily  Qty: 30 tablet, Refills: 3      torsemide (DEMADEX) 20 MG tablet Take 1 tablet by mouth 2 times daily  Qty: 30 tablet, Refills: 5      ferrous sulfate (IRON 325) 325 (65 Fe) MG tablet Take 1 tablet by mouth daily (with breakfast)  Qty: 30 tablet, Refills: 3              Details   Fluticasone furoate-vilanterol (BREO ELLIPTA) 200-25 MCG/INH AEPB inhaler Inhale 1 puff into the lungs daily      metoprolol succinate (TOPROL XL) 100 MG extended release tablet TAKE ONE TABLET BY MOUTH DAILY  Qty: 90 tablet, Refills: 2      lisinopril (PRINIVIL;ZESTRIL) 20 MG tablet Take 1 tablet by mouth daily  Qty: 90 tablet, Refills: 3      atorvastatin (LIPITOR) 40 MG tablet Take 1 tablet by mouth nightly  Qty: 90 tablet, Refills: 3      aspirin 81 MG chewable tablet Take 1 tablet by mouth daily  Qty: 30 tablet, Refills: 3      nitroGLYCERIN (NITROSTAT) 0.4 MG SL tablet up to max of 3 total doses. If no relief after 1 dose, call 911. Qty: 25 tablet, Refills: 3      metFORMIN (GLUCOPHAGE) 500 MG tablet Take 500 mg by mouth daily (with breakfast)   Qty: 60 tablet, Refills: 3             Time Spent on discharge is more than 20 minutes in the examination, evaluation, counseling and review of medications and discharge plan. Signed:     Berenice Romo MD   5/8/2021      Thank you Petros Askew for the opportunity to be involved in this patient's care. If you have any questions or concerns please feel free to contact me at [phone].

## 2021-05-08 NOTE — PROGRESS NOTES
This RN discussed plan of care with pt, asking him to call when he needs to get up, pt refusing bed alarm, says he is okay to walk by himself.

## 2021-05-08 NOTE — CARE COORDINATION
CASE MANAGEMENT DISCHARGE SUMMARY:    DISCHARGE DATE: 5/8/2021    DISCHARGED TO: Home alone with family support as needed. Patient reported no needs at this time.      TRANSPORTATION: Family to transport             TIME: Patient to coordinate with RN     PREFERRED PHARMACY: [facility]              NUMBER: [id number], [place], Social Work/Case Management   [phone]  Electronically signed by JORDY Cedillo, W on 5/8/2021 at 11:41 AM

## 2021-05-08 NOTE — PROGRESS NOTES
Pt discharged to home with granddaughter, iv removed, tele monitor removed, discharge instructions reviewed and signed with pt, pt discharged by wheelchair to hospital entrance with documented belongings, pt and granddaughter verbalized understanding of discharge instructions

## 2021-05-08 NOTE — PROGRESS NOTES
Cardiology - PROGRESS NOTE    Admit Date: 5/3/2021     Reason for follow up: shortness of breath and LE edema     77 y.o. male went to see his primary caretaker for penile edema, lower extremity edema and shortness of breath. The nurse practitioner called the squad and sent him here. He mentions that she thought that he was in A. fib after doing an EKG in the office. However an EKG that I saw then the ER shows sinus tach     Patient states that he takes his torsemide daily but does miss out on other drugs. There is no change in his medications      Social History:   reports that he quit smoking about 40 years ago. He has never used smokeless tobacco. He reports that he does not drink alcohol or use drugs. Family History: family history is not on file. Interval History:   Patient seen and examined and notes reviewed   Wt 255 lb   -11.3 L    creatinine stable 1.9   No acute events   All other systems reviewed and negative except as above.         Diet: DIET LOW SODIUM 2 GM; 1500 ml  Pain is:None  Nausea:None    In: -   Out: 3250    Wt Readings from Last 3 Encounters:   05/08/21 255 lb 11.7 oz (116 kg)   08/24/20 268 lb (121.6 kg)   08/19/20 274 lb (124.3 kg)           Data:   Scheduled Meds:   Scheduled Meds:   furosemide  80 mg Intravenous BID    insulin lispro  0-6 Units Subcutaneous TID WC    insulin lispro  0-3 Units Subcutaneous Nightly    insulin glargine  8 Units Subcutaneous Nightly    aspirin  81 mg Oral Daily    atorvastatin  40 mg Oral Nightly    [Held by provider] ferrous sulfate  324 mg Oral Daily with breakfast    metoprolol succinate  100 mg Oral Daily    spironolactone  25 mg Oral Daily    sodium chloride flush  5-40 mL Intravenous 2 times per day     Continuous Infusions:   dextrose      sodium chloride       PRN Meds:.glucose, dextrose, glucagon (rDNA), dextrose, sodium chloride flush, sodium chloride, promethazine **OR** ondansetron, polyethylene glycol, acetaminophen **OR** acetaminophen, perflutren lipid microspheres  Continuous Infusions:   dextrose      sodium chloride         Intake/Output Summary (Last 24 hours) at 5/8/2021 0855  Last data filed at 5/8/2021 0525  Gross per 24 hour   Intake 300 ml   Output 4000 ml   Net -3700 ml       CBC:   Recent Labs     05/08/21 0451   WBC 5.5   HGB 8.7*        BMP:  Recent Labs     05/08/21  0451      K 4.1   CL 99   CO2 30   BUN 34*   CREATININE 1.9*   GLUCOSE 161*     ABGs: No results found for: PHART, PO2ART, NWQ4SXD  INR: No results for input(s): INR in the last 72 hours. CARDIAC LABS     ENZYMES:No results for input(s): CKMB, CKMBINDEX, TROPONINI in the last 72 hours. Invalid input(s): CKTOTAL;3  FASTING LIPID PANEL:  Lab Results   Component Value Date    HDL 18 05/04/2021    HDL 25 06/14/2010    LDLCALC 35 05/04/2021    TRIG 44 05/04/2021     LIVER PROFILE:No results for input(s): AST, ALT, ALB in the last 72 hours. -----------------------------------------------------------------  Telemetry: personally reviewed   SR with frequent PVC       Echo:     Normal LV size with LVEF of 40%. Global dysfunction. Grade I diastolic  dysfunction with elevated L H filling pressures.   Mild mitral regurgitation is present.   The left atrium is moderately dilated.   Trivial aortic regurgitation is present.   The right ventricle is dilated. Right ventricular systolic function is  mildly reduced.   Mild tricuspid regurgitation.   The right atrium is moderately dilated.     Corornary angiogram  & Intervention:  1.  There is branch vessel disease in this codominant circulation.  The arteries are large. 2.  The left main is free of disease. 3.  LAD is a large vessel, it reaches the apex.  The first diagonal has an 80% to 90% ostial lesion.  The second diagonal is subtotally occluded nd is the culprit vessel.   4.  The left circumflex artery is free of disease.  It is a codominant vessel. 5.  The codominant right also has no major obstructive disease.     CONCLUSION:  1.  Branch vessel coronary artery disease involving first and second diagonal.  The second diagonal is the culprit vessel with subtotal  occlusion.  The first diagonal has an 80% ostial stenosis. 2.  Markedly elevated left heart filling pressures. 3.  Cardiomyopathy which is out of proportion to the coronary artery disease.     CONCLUSION:  Successful PCI and stenting of subtotally occluded diagonal  branch.  Lesion reduced from 100% to 0%.  2.5 x 23-mm JOHNSON was used. Objective:   Vitals: /76   Pulse 69   Temp 98 °F (36.7 °C) (Oral)   Resp 18   Ht 5' 9\" (1.753 m)   Wt 255 lb 11.7 oz (116 kg)   SpO2 91%   BMI 37.77 kg/m²   General appearance: alert, appears stated age and cooperative, No acute distress   Skin: Skin color, texture, turgor normal. No rashes or ecchymosis. HEENT: Head: Normocephalic, no lesions, without obvious abnormality.   Neck: no JVD and thyroid not enlarged, symmetric, no tenderness/mass/nodules  Lungs: clear to auscultation bilaterally, no accessory muscle use, no respiratory distress,   Heart: regular rate and rhythm, S1, S2 normal, no murmur, click, rub or gallop  Abdomen: soft, non-tender; bowel sounds normal; no masses,  no organomegaly  Extremities: extremities normal, atraumatic, no cyanosis or edema, pulses: DP +2/+2, PT +2/+2  Neurologic: Mental status: Alert, oriented, thought content appropriate, no tremors, no gross sensory motor deficit,   Psychiatric: normal insight and affect      Assessment & Plan:    Patient Active Problem List:     Spleen enlargement     Hepatitis C     Colon polyps     Iron deficiency anemia     Hairy cell leukemia, in remission ([facility] [address]     Diabetes mellitus type 2 in obese [facility])     Essential hypertension     Acute blood loss anemia     Acute pain of left knee     Primary osteoarthritis of left knee     STEMI (ST elevation myocardial infarction) [facility])     New onset atrial fibrillation ([facility] [address]        Plan:  1. CHF    Acute combined systolic and diastolic    EF 47%    Appears compensated   On lasix IVP     Transition to demadex 20 mg PO BID   Continue Toprol and aldactone   No ACE/ARB secondary to renal fx   Continue fluid and Na restrictions   2. Tachycardia    HR stable-SR    No AF noted     Frequent multifocal PAC      Continue beta-blocker therapy   Outpatient 30 day monitor to be arranged with f/u appt   3. Anemia   H/H stable   4. AINTA on CKD   Creatinine stable     Patient is stable from a CV standpoint. Cardiology will sign off with outpatient follow up in 2-3 weeks. Please call with questions.         Miguel Jaimes, APRN-CNP   [address]  Cardiology   5/8/2021  8:55 AM

## 2021-05-18 ENCOUNTER — OFFICE VISIT (OUTPATIENT)
Dept: CARDIOLOGY CLINIC | Age: 76
End: 2021-05-18
Payer: MEDICARE

## 2021-05-18 ENCOUNTER — TELEPHONE (OUTPATIENT)
Dept: CARDIOLOGY CLINIC | Age: 76
End: 2021-05-18

## 2021-05-18 VITALS
BODY MASS INDEX: 37.09 KG/M2 | DIASTOLIC BLOOD PRESSURE: 78 MMHG | OXYGEN SATURATION: 97 % | WEIGHT: 250.4 LBS | HEART RATE: 107 BPM | HEIGHT: 69 IN | SYSTOLIC BLOOD PRESSURE: 120 MMHG

## 2021-05-18 DIAGNOSIS — I25.5 ISCHEMIC CARDIOMYOPATHY: ICD-10-CM

## 2021-05-18 DIAGNOSIS — I50.43 ACUTE ON CHRONIC COMBINED SYSTOLIC AND DIASTOLIC CONGESTIVE HEART FAILURE (HCC): ICD-10-CM

## 2021-05-18 DIAGNOSIS — I50.23 ACUTE ON CHRONIC SYSTOLIC HEART FAILURE (HCC): ICD-10-CM

## 2021-05-18 DIAGNOSIS — I25.10 CAD IN NATIVE ARTERY: ICD-10-CM

## 2021-05-18 DIAGNOSIS — R06.83 SNORING: ICD-10-CM

## 2021-05-18 DIAGNOSIS — I48.91 NEW ONSET ATRIAL FIBRILLATION (HCC): Primary | ICD-10-CM

## 2021-05-18 LAB
ANION GAP SERPL CALCULATED.3IONS-SCNC: 13 MMOL/L (ref 3–16)
BUN BLDV-MCNC: 29 MG/DL (ref 7–20)
CALCIUM SERPL-MCNC: 9.3 MG/DL (ref 8.3–10.6)
CHLORIDE BLD-SCNC: 100 MMOL/L (ref 99–110)
CO2: 24 MMOL/L (ref 21–32)
CREAT SERPL-MCNC: 1.7 MG/DL (ref 0.8–1.3)
GFR AFRICAN AMERICAN: 48
GFR NON-AFRICAN AMERICAN: 39
GLUCOSE BLD-MCNC: 166 MG/DL (ref 70–99)
POTASSIUM SERPL-SCNC: 4.9 MMOL/L (ref 3.5–5.1)
PRO-BNP: 3550 PG/ML (ref 0–449)
SODIUM BLD-SCNC: 137 MMOL/L (ref 136–145)

## 2021-05-18 PROCEDURE — 93000 ELECTROCARDIOGRAM COMPLETE: CPT | Performed by: NURSE PRACTITIONER

## 2021-05-18 PROCEDURE — 99214 OFFICE O/P EST MOD 30 MIN: CPT | Performed by: NURSE PRACTITIONER

## 2021-05-18 NOTE — TELEPHONE ENCOUNTER
Detail Level: Detailed Please call office of Ky Irene (PCP) and request a copy of EKG they did on patient in their office 5/3/2021. Scan in chart. Render Post-Care Instructions In Note?: no Medical Necessity Clause: This procedure was medically necessary because the lesions that were treated were: Consent: The patient's consent was obtained including but not limited to risks of crusting, scabbing, blistering, scarring, darker or lighter pigmentary change, recurrence, incomplete removal and infection. Post-Care Instructions: I reviewed with the patient in detail post-care instructions. Patient is to wear sunprotection, and avoid picking at any of the treated lesions. Pt may apply Vaseline to crusted or scabbing areas. Medical Necessity Information: It is in your best interest to select a reason for this procedure from the list below. All of these items fulfill various CMS LCD requirements except the new and changing color options.

## 2021-05-18 NOTE — PROGRESS NOTES
The [address] [place] [address], [address] [place] [address] [address], [place] [address]  [phone]    PrimaryCare Doctor:  Isa Driscoll  Primary Cardiologist: Dr. Jennifer Waite    Chief Complaint   Patient presents with    Follow-up     sob on exertion          History of Present Illness:  Kristina Hoffman is a 77 y.o. male with HF, pleural effusions, anemia, CAD/STEMI-lateral s/p ballon/JOHNSON to 2nd diag 2018, HTN, DM, chronic thrombocytopenia, basal cell leukemia (Managed by Dr. Concha De) ANITA/CKD. Former smoker. Patient was admitted to [facility] 5/3/21-5/8/21 with acute resp failure and irregular heart rhythm -possibly AF as reported by EMS. Diuresed ~11.3L. DC wt 255 lbs. Patient presents to [facility] cardiology for follow up for heart failure. Daily wt 244.1lbs today. Down from 250lbs at discharge. Activity is limited with bad knee-needs right knee replacement- uses a cane. He does get mild SOB with activity. Resolves with rest. Associated with generalized fatigue. SOB is significantly improved since hospitalization. He lives in an apartment building and he is walking in the patel for exercise. Denies SOB or CP. Abd is large - appears to be slightly distended but patient states also much improved. RLE 1+ edema pedal to knee, no edema LLE. Denies LH, dizzy, syncope, palpitations. Trying to follow a 1500 ml fluid restriction but admits it is hard to follow. He is following 2gm Na diet. + snoring and daytime fatigue with frequent napping. Review of Systems:   General: Denies fever, chills,   Skin: Denies skin changes, rash, itching, lesions.   HEENT: Denies headache, dizziness, vision changes, nosebleeds, sore throat, nasal drainage  RESP: Denies cough, sputum, wheez  CARD: Denies palpitations,  Murmur, chest pain  GI:Denies nausea, vomiting, heartburn, loss of appetite, change in bowels  : Denies frequency, pain, incontinence, polyuria  VASC: Denies claudication, leg cramps, clots MUSC/SKEL: Denies pain, stiffness, arthritis  PSYCH: Denies anxiety, depression, stress  NEURO: Denies numbness, tingling, weakness,change in mood or memory  HEME: Denies abn bruising, bleeding, anemia  ENDO: Denies intolerance to heat, cold, excessive thirst or hunger, hx thyroid disease    /78   Pulse 107   Ht 5' 9\" (1.753 m)   Wt 250 lb 6.4 oz (113.6 kg)   SpO2 97%   BMI 36.98 kg/m²   Wt Readings from Last 3 Encounters:   05/18/21 250 lb 6.4 oz (113.6 kg)   05/08/21 255 lb 11.7 oz (116 kg)   08/24/20 268 lb (121.6 kg)       Physical Exam:  GEN: Appears well, no acute distress  SKIN: Brown, warm, dry. Nails without clubbing. HEENT: PERRLA. Normocephalic, atraumatic. Neck supple. No adenopathy. LUNG: AP diameter normal. Clear bilateral. Diminished bilateral bases. No wheeze, rales, or ronchi. Respiratory effort normal.  HEART: S1S2 A/R. No JVD. No carotid bruit. No murmur, rub or gallop. ABD: Large, soft, nontender, slightly distended. +BS X 4 quads. No hepatomegaly. EXT: Radial and pedal pulses 2+ and symmetric. Without varicosities. 1+ RLE edema. MUSCSKEL: Good ROM X4 extremities. No deformity. NEURO: A/O X3. Calm and cooperative. Past Medical History:   has a past medical history of Anemia, Arthritis, Cancer ([facility] [address] Colon polyps, Diabetes mellitus ([facility] [address] Hepatitis C, Obesity, and Splenomegaly. Surgical History:   has a past surgical history that includes bone marrow biopsy; Colonoscopy; liver biopsy; and Total knee arthroplasty (Left, 08/02/2017). Social History:   reports that he quit smoking about 40 years ago. He has never used smokeless tobacco. He reports that he does not drink alcohol and does not use drugs. Family History:   History reviewed. No pertinent family history. HomeMedications:  Prior to Admission medications    Medication Sig Start Date End Date Taking?  Authorizing Provider   spironolactone (ALDACTONE) 25 MG tablet Take 1 tablet by mouth daily 5/9/21  Yes Rena Currie MD Usman   torsemide (DEMADEX) 20 MG tablet Take 1 tablet by mouth 2 times daily 5/8/21  Yes Gerald Carlin MD   ferrous sulfate (IRON 325) 325 (65 Fe) MG tablet Take 1 tablet by mouth daily (with breakfast) 5/8/21  Yes Gerald Carlin MD   Fluticasone furoate-vilanterol (BREO ELLIPTA) 200-25 MCG/INH AEPB inhaler Inhale 1 puff into the lungs daily   Yes Kindra Cohen MD   metoprolol succinate (TOPROL XL) 100 MG extended release tablet TAKE ONE TABLET BY MOUTH DAILY 2/23/21  Yes Gio Alcaraz MD   atorvastatin (LIPITOR) 40 MG tablet Take 1 tablet by mouth nightly 5/8/20  Yes Gio Alcaraz MD   aspirin 81 MG chewable tablet Take 1 tablet by mouth daily 12/28/18  Yes Gio Alcaraz MD   nitroGLYCERIN (NITROSTAT) 0.4 MG SL tablet up to max of 3 total doses. If no relief after 1 dose, call 911. 12/27/18  Yes Gio Alcaraz MD   metFORMIN (GLUCOPHAGE) 500 MG tablet Take 500 mg by mouth daily (with breakfast)  12/30/18  Yes Gio Alcaraz MD        Allergies:  Nabumetone and Oxycodone       LABS: Results reviewed with patient today.     CBC:   Lab Results   Component Value Date    WBC 5.5 05/08/2021    WBC 4.8 05/07/2021    WBC 4.8 05/06/2021    RBC 5.15 05/08/2021    RBC 5.03 05/07/2021    RBC 5.10 05/06/2021    RBC 4.93 06/21/2017    RBC 5.46 02/22/2017    RBC 4.72 08/31/2016    HGB 8.7 05/08/2021    HGB 8.6 05/07/2021    HGB 8.7 05/06/2021    HCT 30.2 05/08/2021    HCT 29.4 05/07/2021    HCT 29.6 05/06/2021    MCV 58.7 05/08/2021    MCV 58.5 05/07/2021    MCV 58.0 05/06/2021    RDW 22.9 05/08/2021    RDW 23.0 05/07/2021    RDW 22.4 05/06/2021     05/08/2021     05/07/2021     05/06/2021     BMP:  Lab Results   Component Value Date     05/08/2021     05/07/2021     05/06/2021    K 4.1 05/08/2021    K 4.3 05/07/2021    K 4.5 05/06/2021    CL 99 05/08/2021     05/07/2021     05/06/2021    CO2 30 05/08/2021    CO2 26 05/07/2021    CO2 25 05/06/2021    PHOS 3.8 01/08/2019    PHOS 3.4 Trivial Mitral and Tricuspid regurgitation. CATH: 12/27/2018  CORONARY ANGIOGRAM:  1. There is branch vessel disease in this codominant circulation. The  arteries are large. 2.  The left main is free of disease. 3.  LAD is a large vessel, it reaches the apex. The first diagonal has  an 80% to 90% ostial lesion. The second diagonal is subtotally occluded  and is the culprit vessel. 4.  The left circumflex artery is free of disease. It is a codominant  vessel. 5.  The codominant right also has no major obstructive disease.     CONCLUSION:  1. Branch vessel coronary artery disease involving first and second  diagonal.  The second diagonal is the culprit vessel with subtotal  occlusion. The first diagonal has an 80% ostial stenosis. 2.  Markedly elevated left heart filling pressures. 3.  Cardiomyopathy with an _____ to the coronary artery disease.     INTERVENTIONAL PROCEDURE:  Catheters used are EBU 4 guide, Runthrough  wire, 2.25 balloon, 2.5 x 23-mm long Synergy stent.       Assessment/Plan:      1.) Acute on chronic combined diastolic Gr I and systolic HF, EF 21%: Most likely ischemic from previous infarct. SOB, fatigue, edema, abd distention improved. Wt down ~ 6 lbs. Activity intolerance multifactorial with right knee issues. Check BNP,BMP and make changes as needed. NYHA Class III   Stage C  Diuretic: torsemide 20gm BID  Beta Blocker: Toprol XL  ACEi/ARB/ARNI: held for ANITA  Aldosterone Antagonist: spironolactone  SGLT2 Inhibitor: Defer to Dr. Clem Tomas at FU.  2gm Na diet, daily weight, 64 oz fluid restriction  Avoid NSAIDS and other nephrotoxic meds  Cardiac Rehab: Not indicated for EF>35%  ICD: Not indicated for EF>35%  Wellness Center Referral: yes    2.) CAD s/p angioplasty/JOHNSON to Diag2 2018. 1st diag 80%. Stable.   DAPT: asa  Beta Blocker: Toprol XL  ACEi/ARB: held ANITA  Anti anginal: none  Lipid management/high intensity statin: lipitor  Risk factor management: high blood pressure, high cholesterol, Diabetes, smoking, obesity, family hx  Lifestyle modification: Heart healthy diet, regular exercise, weight loss, smoking cessation, stress reduction  Cardiac Rehab: Phase 2    3.) Tachycardia: Reported AF by PCP. EKG not found. EKGs in hospital , no evidence of AF. Discussed with patient. Will requst EKG from Kelly Acosta before doing further testing.     4.) Anemia:  -ferrous sulfate    5.) Snoring with daytime fatigue and napping:   Sleep Referral    Instructions:   1. Medications: Continue current medications  2. Labs: BMP,BNP  3. Follow up: 2 weeks  4. Daily weight: Call for increase 3 lbs/day or 5 lbs/week. 5. 2 gm sodium diet:  6. Fluid Restriction: 64 oz. (1/2 gallon)  7. Referred to [facility] for Education:  Yes    I appreciate the opportunity of cooperating in the care of this individual.    NUPUR Huff, JACKI - CNP, CNP, 5/18/2021,11:18 AM

## 2021-05-18 NOTE — PATIENT INSTRUCTIONS
Instructions:   1. Medications: Continue current medications  2. Labs: BMP,BNP  3. Follow up: 2 weeks  4. Daily weight: Call for increase 3 lbs/day or 5 lbs/week. 5. 2 gm sodium diet:  6. Fluid Restriction: 64 oz. (1/2 gallon)  7. Referred to [facility] for Education:  Yes

## 2021-05-18 NOTE — LETTER
[facility] HEART INST [facility]  Phone: [phone]  Fax: [phone]    JACKI Rausch CNP    May 18, 2021     Immanuel Alvarez  [address]    Patient: Ramon Dos Santos   MR Number: [id number]   YOB: 1945   Date of Visit: 5/18/2021       Dear Immanuel Alvarez: Thank you for referring Scotty Prince to me for evaluation/treatment. Below are the relevant portions of my assessment and plan of care. If you have questions, please do not hesitate to call me. I look forward to following Carter along with you.     Sincerely,        JACKI Rausch CNP

## 2021-05-19 ENCOUNTER — TELEPHONE (OUTPATIENT)
Dept: PHARMACY | Age: 76
End: 2021-05-19

## 2021-05-19 NOTE — TELEPHONE ENCOUNTER
Left message for return call to outpatient wellness center. Attempting to schedule patient for new heart failure visit.

## 2021-05-20 ENCOUNTER — TELEPHONE (OUTPATIENT)
Dept: PHARMACY | Age: 76
End: 2021-05-20

## 2021-05-20 DIAGNOSIS — R73.9 HYPERGLYCEMIA: Primary | ICD-10-CM

## 2021-05-20 DIAGNOSIS — I50.23 ACUTE ON CHRONIC SYSTOLIC HEART FAILURE (HCC): ICD-10-CM

## 2021-05-20 NOTE — TELEPHONE ENCOUNTER
Outbound call patient for scheduling new heart failure visit. Patient refuses visit at this time. States that his nurse practitioner Zacarias Pittman at [facility] was very thorough and he is doing everything that he was instructed to do including weighing himself daily, sodium and fluid restrictions. He has all of his medications and has no questions or concerns at this time. I gave him the number for the outpatient wellness center and told him to call back if he became interested in scheduling.

## 2021-05-20 NOTE — TELEPHONE ENCOUNTER
Reviewed EKG. Although HR is irregular it appears to be sinus with possibly PACs and not AF. There has not been any other evidence while in hospital of AF or in FU OV EKG. Also reviewed with Dr. Ele Mcghee. Discussed with patient. Will not need 30 day event at this time. He is scheduled to FU with Dr. Ele Mcghee in 2 weeks.

## 2021-05-24 NOTE — TELEPHONE ENCOUNTER
Last ov 5/18/21  Pending appt 6/11/21  (if apt not scheduled call the pt or send my chart message)  Last refill 5/8/20 #90x3  Last labs 5/4/21

## 2021-05-24 NOTE — TELEPHONE ENCOUNTER
Last ov 5/18/21  Pending appt 6/7/21  (if apt not scheduled call the pt or send my chart message)  Last refill 5/8/20 #90x3  Last labs 5/4/21

## 2021-05-25 RX ORDER — ATORVASTATIN CALCIUM 40 MG/1
TABLET, FILM COATED ORAL
Qty: 90 TABLET | Refills: 2 | Status: SHIPPED
Start: 2021-05-25 | End: 2021-10-07 | Stop reason: ALTCHOICE

## 2021-05-25 RX ORDER — LISINOPRIL 20 MG/1
TABLET ORAL
Qty: 90 TABLET | Refills: 2 | Status: SHIPPED | OUTPATIENT
Start: 2021-05-25 | End: 2021-06-03

## 2021-05-26 NOTE — PROGRESS NOTES
fever  Respiratory: Negative for Asthma;  cough and hemoptysis  Cardiovascular: Negative for palpitations,dizziness   Gastrointestinal: Negative for abd.pain; constipation/diarrhea;    Genitourinary: Negative for stones; hematuria; frequency hesitancy  Integumentt: Negative for rash or pruritis  Hematologic/lymphatic: Negative for blood dyscrasia; leukemia/lymphoma  Musculoskeletal: Negative for Connective tissue disease  Neurological:  Negative for Seizure   Behavioral/Psych:Negative for Bipolar disorder, Schizophrenia; Dementia  Endocrine: negative for thyroid, parathyroid disease    Physical Examination:    /70 (Site: Left Upper Arm, Cuff Size: Large Adult)   Pulse 105   Ht 5' 9\" (1.753 m)   Wt 254 lb (115.2 kg)   SpO2 99%   BMI 37.51 kg/m²    HEENT:  Face: Atraumatic, Conjunctiva: Pink; non icteric,  Mucous Memb:  Moist, No thyromegaly or Lymphadenopathy  Respiratory:  Resp Assessment: normal, Resp Auscultation: clear  Cardiovascular: Auscultation: nl S1 & S2, Palpation:  Nl PMI;  No heaves or thrills, JVP:  normal  Abdomen: Soft, non-tender, Normal bowel sounds,  No organomegaly  Extremities: No Cyanosis or Clubbing  Neurological: Oriented to time, place, and person, Non-anxious  Psychiatric: Normal mood and affect  Skin: Warm and dry,  No rash seen     Outpatient Medications Marked as Taking for the 6/3/21 encounter (Office Visit) with Abundio Bagley MD   Medication Sig Dispense Refill    atorvastatin (LIPITOR) 40 MG tablet TAKE ONE TABLET BY MOUTH ONCE NIGHTLY 90 tablet 2    spironolactone (ALDACTONE) 25 MG tablet Take 1 tablet by mouth daily 30 tablet 3    torsemide (DEMADEX) 20 MG tablet Take 1 tablet by mouth 2 times daily 30 tablet 5    ferrous sulfate (IRON 325) 325 (65 Fe) MG tablet Take 1 tablet by mouth daily (with breakfast) 30 tablet 3    Fluticasone furoate-vilanterol (BREO ELLIPTA) 200-25 MCG/INH AEPB inhaler Inhale 1 puff into the lungs daily      aspirin 81 MG chewable tablet Take 1 tablet by mouth daily 30 tablet 3    nitroGLYCERIN (NITROSTAT) 0.4 MG SL tablet up to max of 3 total doses. If no relief after 1 dose, call 911. 25 tablet 3    metFORMIN (GLUCOPHAGE) 500 MG tablet Take 500 mg by mouth daily (with breakfast)  60 tablet 3         Labs:   Lab Results   Component Value Date    HDL 18 2021    HDL 25 2010    LDLCALC 35 2021    TRIG 44 2021         EK2019, Sinus Rhythm  20, sinus rhythm, rate 99  6/3/21, normal rhythm       ECHO: 21  Normal LV size with LVEF of 40%. Global dysfunction. Grade I diastolic  dysfunction with elevated L H filling pressures. Mild mitral regurgitation is present. The left atrium is moderately dilated. Trivial aortic regurgitation is present. The right ventricle is dilated. Right ventricular systolic function is mildly reduced. Mild tricuspid regurgitation. The right atrium is moderately dilated. ECHO: 2018  Normal LV size with global systolic dysfunction. EF 35%. Distal anterior  hypokinesis. The left atrium is severely dilated. Normal right ventricular size and function. Trivial Mitral and Tricuspid regurgitation. [facility] angiogram  & Intervention: 2018  1. Dorothye Hero is branch vessel disease in this codominant circulation.  The arteries are large. 2.  The left main is free of disease. 3.  LAD is a large vessel, it reaches the apex.  D1: 80- 90% ostial lesion.  D2: subtotally occluded and is the culprit vessel. 4.  The left circumflex artery is free of disease.  It is a codominant vessel. 5.  The codominant right also has no major obstructive disease.     Successful PCI and stenting of subtotally occluded diagonal  branch.  Lesion reduced from 100% to 0%.  2.5 x 23-mm JOHNSON stent was  used.     RACHELLE Dixon initially accessed the groin to the right femoral.  However, we  probably entered dissection plane as we could not advance the wire.  We  therefore accessed the left femoral artery. ASSESSMENT AND PLAN:      CAD / STEMI 12/24/18  High lateral infarct secondary to occlusion of the second diagonal branch. This was ballooned and stented using 2.5×23 mm JOHNSON stent. First diagonal also has 80% ostial lesion. Other major vessels are free of disease  EF 35%  Continue ASA and Statin   Denies any angina     Ischemic cardiomyopathy / CHF  I think the LV dysfunction is out of proportion to CAD; therefore probably non ischemic  EF 35%, with high LVEDP  Continue current medical therapy; Aldactone, Torsemide, B-blocker   ACE stopped due to renal insufficiency (creat1.7 on 5/18/51)  May take additional Torsemide for 1-2 days as needed for worsening breathing or weight gain of 2-3 pounds in a day or 5 pounds in a week  Reinforced fluid and sodium restriction   Repeat renal panel today    Hematologic Disorder  Basal cell leukemia  Managed by Dr. Vinayak Handley    Type 2 DM  Managed by PCP  On Metformin    Renal insufficiency  Creat 1.7 on 5/18/21  Repeat renal panel today       Mr. Natalia Lam very likely has combined systolic and diastolic heart failure. On current regimen he is well compensated. I reminded him about being careful with fluid and salt restriction, compliance with his diuretics and his beta-blockers. He is not on ACE inhibitor because of renal insufficiency.        Follow up in 4 months        Thank you very much for allowing me to participate in the care of your patient. Please do not hesitate to contact me if you have any questions. Sincerely,    Ryan Olmos M.D  [facility] AND [facility], [address] [place] [address]  Ph: [phone]  Fax: [phone]    This note was scribed in the presence of Dr. Ryan Olmos MD by [facility]  Physician Attestation:  The scribes documentation has been prepared under my direction and personally reviewed by me in its entirety.      I confirm that the note above accurately reflects all work, treatment, procedures, and medical decision making performed by me.

## 2021-06-03 ENCOUNTER — OFFICE VISIT (OUTPATIENT)
Dept: CARDIOLOGY CLINIC | Age: 76
End: 2021-06-03
Payer: MEDICARE

## 2021-06-03 VITALS
DIASTOLIC BLOOD PRESSURE: 70 MMHG | HEIGHT: 69 IN | BODY MASS INDEX: 37.62 KG/M2 | HEART RATE: 105 BPM | SYSTOLIC BLOOD PRESSURE: 116 MMHG | WEIGHT: 254 LBS | OXYGEN SATURATION: 99 %

## 2021-06-03 DIAGNOSIS — I25.10 CAD IN NATIVE ARTERY: Primary | ICD-10-CM

## 2021-06-03 DIAGNOSIS — I25.5 ISCHEMIC CARDIOMYOPATHY: ICD-10-CM

## 2021-06-03 DIAGNOSIS — I48.0 PAF (PAROXYSMAL ATRIAL FIBRILLATION) (HCC): ICD-10-CM

## 2021-06-03 LAB
ALBUMIN SERPL-MCNC: 4.2 G/DL (ref 3.4–5)
ANION GAP SERPL CALCULATED.3IONS-SCNC: 12 MMOL/L (ref 3–16)
BUN BLDV-MCNC: 21 MG/DL (ref 7–20)
CALCIUM SERPL-MCNC: 9.3 MG/DL (ref 8.3–10.6)
CHLORIDE BLD-SCNC: 99 MMOL/L (ref 99–110)
CO2: 25 MMOL/L (ref 21–32)
CREAT SERPL-MCNC: 1.8 MG/DL (ref 0.8–1.3)
GFR AFRICAN AMERICAN: 45
GFR NON-AFRICAN AMERICAN: 37
GLUCOSE BLD-MCNC: 192 MG/DL (ref 70–99)
PHOSPHORUS: 3.5 MG/DL (ref 2.5–4.9)
POTASSIUM SERPL-SCNC: 5.7 MMOL/L (ref 3.5–5.1)
SODIUM BLD-SCNC: 136 MMOL/L (ref 136–145)

## 2021-06-03 PROCEDURE — 99214 OFFICE O/P EST MOD 30 MIN: CPT | Performed by: INTERNAL MEDICINE

## 2021-06-03 PROCEDURE — 93000 ELECTROCARDIOGRAM COMPLETE: CPT | Performed by: INTERNAL MEDICINE

## 2021-06-03 NOTE — PATIENT INSTRUCTIONS
Angiotensin-converting enzyme (ACE) inhibitors or angiotensin II receptor blockers (ARBs). These make it easier for blood to flow. ? Diuretics. These help remove excess fluid from the body. ? Beta-blockers. These slow the heart rate and can help the heart fill with blood more completely. Heart-healthy lifestyle    · Be active. Exercise regularly, but don't exercise too hard. If you aren't already active, your doctor may want you to start exercising. But don't start until you have talked with your doctor to make an exercise program that is safe for you.     · Do not smoke. Smoking can make a heart condition worse. If you need help quitting, talk to your doctor about stop-smoking programs and medicines. These can increase your chances of quitting for good.     · Eat a heart-healthy diet.     · Stay at a healthy weight. Lose weight if you need to.     · If your doctor recommends it, limit sodium. This helps keep fluid from building up in your body. It may help you feel better.     · Manage other health problems. These include diabetes, high blood pressure, and high cholesterol. If you think you may have a problem with alcohol or drug use, talk to your doctor. Weight monitoring    · Weigh yourself without clothing at the same time each day. Record your weight. Call your doctor if you have a sudden weight gain, such as more than 2 to 3 pounds in a day or 5 pounds in a week. (Your doctor may suggest a different range of weight gain.) A sudden weight gain may mean that your condition is getting worse. When should you call for help? Call 911 anytime you think you may need emergency care. For example, call if:    · You have symptoms of sudden heart failure. These may include:  ? Severe trouble breathing. ? A fast or irregular heartbeat. ? Coughing up pink, foamy mucus. ? Passing out. Call your doctor now or seek immediate medical care if:    · You have new or changed symptoms of heart failure, such as:  ?  New or increased shortness of breath. ? New or worse swelling in your legs, ankles, or feet. ? Sudden weight gain, such as more than 2 to 3 pounds in a day or 5 pounds in a week. (Your doctor may suggest a different range of weight gain.)  ? Feeling dizzy or lightheaded or like you may faint. ? Feeling so tired or weak that you cannot do your usual activities. ? Not sleeping well. Shortness of breath wakes you at night. You need extra pillows to prop yourself up to breathe easier. Watch closely for changes in your health, and be sure to contact your doctor if you have any problems. Where can you learn more? Go to https://IntrallectpeGarmentoryeweb.[employer]. org and sign in to your [employer] account. Enter B164 in the [employer] box to learn more about \"Dilated Cardiomyopathy: Care Instructions. \"     If you do not have an account, please click on the \"Sign Up Now\" link. Current as of: August 31, 2020               Content Version: 12.8  © 2006-2021 [email or web address]. Care instructions adapted under license by [facility] ([facility]). If you have questions about a medical condition or this instruction, always ask your healthcare professional. [place] [address] any warranty or liability for your use of this information. Patient Education        Fluid Restriction: Care Instructions  Your Care Instructions     A buildup of fluid in the body can cause low sodium levels in the blood. It may also cause symptoms such as swelling and pain. Your doctor may suggest that you limit liquids, including foods that contain a lot of liquid. Limiting liquids is called fluid restriction. Keeping track of the amount of fluids you take in may help you feel better. Your doctor will tell you how much fluid you can have in a day. Follow-up care is a key part of your treatment and safety. Be sure to make and go to all appointments, and call your doctor if you are having problems.  It's also a good idea to know your test results and keep a list of the medicines you take. How can you care for yourself at home? · Find a way of tracking the fluids you take in that works for you. Here are two methods you can try:  ? Write down how much you drink throughout the day. ? Keep a container filled with the amount of liquid allowed for the day. As you drink liquids during the day, such as a 6-ounce cup of coffee, pour that same amount out of the container. When the container is empty, you've had your liquid for the day. · Count any foods that will melt (such as ice cream, gelatin, or flavored ice treats) or liquid foods (such as soup) as part of your fluids for the day. Also count the liquid in canned fruits and vegetables as part of your daily intake, or drain them well before serving. · Space your liquids throughout the day. Then you won't be tempted to drink more than the amount your doctor recommends. · To relieve thirst without taking in extra water, try chewing gum, sucking on hard candy (sugarless if you have diabetes), or rinsing your mouth with water and spitting it out. Where can you learn more? Go to https://OfercitypepicewOrthoPediactrics.[employer]. org and sign in to your [employer] account. Enter W783 in the [employer] box to learn more about \"Fluid Restriction: Care Instructions. \"     If you do not have an account, please click on the \"Sign Up Now\" link. Current as of: August 31, 2020               Content Version: 12.8  © 2006-2021 [employer] Care instructions adapted under license by [facility] ([facility]). If you have questions about a medical condition or this instruction, always ask your healthcare professional. [place] [address] any warranty or liability for your use of this information. Patient Education        Learning About Heart Failure Zones  What are heart failure zones? Heart failure zones give you an easy way to see changes in your heart failure symptoms.  They also tell you when you need to get help. Check every day to see which zone you are in. Green zone. You are doing well. This is where you want to be. · Your weight is stable. It's not going up or down. · You breathe easily. · You are sleeping well. You are able to lie flat without shortness of breath. · You can do your usual activities. Yellow zone. Be careful. Your symptoms are changing. Call your doctor. · You have new or increased shortness of breath. · You are dizzy or lightheaded, or you feel like you may faint. · You have sudden weight gain, such as more than 2 to 3 pounds in a day or 5 pounds in a week. (Your doctor may suggest a different range of weight gain.)  · You have increased swelling in your legs, ankles, or feet. · You are so tired or weak that you can't do your usual activities. · You are not sleeping well. Shortness of breath wakes you up at night. You need extra pillows. Red zone. This is an emergency. Call 911. You have symptoms of sudden heart failure. For example:  · You have severe trouble breathing. · You cough up pink, foamy mucus. · You have a new irregular or fast heartbeat. You have symptoms of a heart attack. These may include:  · Chest pain or pressure, or a strange feeling in the chest.  · Sweating. · Shortness of breath. · Nausea or vomiting. · Pain, pressure, or a strange feeling in the back, neck, jaw, or upper belly or in one or both shoulders or arms. · Lightheadedness or sudden weakness. · A fast or irregular heartbeat. If you have symptoms of a heart attack: After you call 911, the  may tell you to chew 1 adult-strength or 2 to 4 low-dose aspirin. Wait for an ambulance. Do not try to drive yourself. Follow-up care is a key part of your treatment and safety. Be sure to make and go to all appointments, and call your doctor if you are having problems. It's also a good idea to know your test results and keep a list of the medicines you take.   Where can you learn more? Go to https://chpepiceweb.healthHallpass [email or web address] org and sign in to your [employer] account. Enter T174 in the [facility] box to learn more about \"Learning About Heart Failure Zones. \"     If you do not have an account, please click on the \"Sign Up Now\" link. Current as of: August 31, 2020               Content Version: 12.8  © 2006-2021 Healthwise, [employer]. Care instructions adapted under license by [facility] ([facility]). If you have questions about a medical condition or this instruction, always ask your healthcare professional. [place] [address] any warranty or liability for your use of this information. 1. Monitor salt and fluid intake. 2. You may take an additional dose of torsemide for 1-2 days as needed for worsening breathing or weight gain of 2-3 pounds in a day or 5 pounds in a week.      3. Repeat labs

## 2021-06-04 DIAGNOSIS — N18.9 CHRONIC KIDNEY DISEASE, UNSPECIFIED CKD STAGE: ICD-10-CM

## 2021-06-04 DIAGNOSIS — E87.5 HYPERKALEMIA: Primary | ICD-10-CM

## 2021-06-04 RX ORDER — SPIRONOLACTONE 25 MG/1
TABLET ORAL
Qty: 30 TABLET | Refills: 3
Start: 2021-06-04

## 2021-06-14 DIAGNOSIS — I50.23 ACUTE ON CHRONIC SYSTOLIC HEART FAILURE (HCC): ICD-10-CM

## 2021-06-14 DIAGNOSIS — R73.9 HYPERGLYCEMIA: ICD-10-CM

## 2021-06-14 LAB
ANION GAP SERPL CALCULATED.3IONS-SCNC: 17 MMOL/L (ref 3–16)
BUN BLDV-MCNC: 23 MG/DL (ref 7–20)
CALCIUM SERPL-MCNC: 9 MG/DL (ref 8.3–10.6)
CHLORIDE BLD-SCNC: 103 MMOL/L (ref 99–110)
CO2: 17 MMOL/L (ref 21–32)
CREAT SERPL-MCNC: 1.5 MG/DL (ref 0.8–1.3)
GFR AFRICAN AMERICAN: 55
GFR NON-AFRICAN AMERICAN: 46
GLUCOSE BLD-MCNC: 208 MG/DL (ref 70–99)
POTASSIUM SERPL-SCNC: 5.1 MMOL/L (ref 3.5–5.1)
PRO-BNP: 3352 PG/ML (ref 0–449)
SODIUM BLD-SCNC: 137 MMOL/L (ref 136–145)

## 2021-06-15 LAB
ESTIMATED AVERAGE GLUCOSE: 185.8 MG/DL
HBA1C MFR BLD: 8.1 %

## 2021-10-06 NOTE — PROGRESS NOTES
[facility]  Cardiology Progress Note      eHtal Anton  1945, 77 y.o.      CC: \" I'm eating more vegetables. \"     Corwin Ng:      HPI:   This is a 77 y.o. male  with history of diabetes, hepatitis C and basal cell leukemia who came in with chest pain and was found to have CHF as well as large diagonal occlusion that was opened and stented in 2018. EF at that time was 35%. He was hospitalized 5/3-21 and treated for acute resp failure, acute heart failure and anemia. Treated with Lasix and diuresed well. Uses a cane for ambulation assistance. Today, he returns in follow up. Says he is now trying to eat a lot of vegetables. Says he sleeps well at night with no difficulty breathing. He has no new cardiac complaints and has been well. Has been compliant and tolerating medication. Today, specifically denies any CP, SOB, dizziness, heart racing or lightheadedness. Past Medical History:   Diagnosis Date    [facility]fib [facility]     Anemia     Arthritis     Cancer ([facility]     hairy cell leukemia    CHF (congestive heart failure) ([facility]     Colon polyps 2010    Diabetes mellitus ([facility]     Hepatitis C     Hypertension     Obesity     Splenomegaly       Past Surgical History:   Procedure Laterality Date    BONE MARROW BIOPSY      COLONOSCOPY      LIVER BIOPSY      TOTAL KNEE ARTHROPLASTY Left 2017      No family history on file.    Social History     Tobacco Use    Smoking status: Former Smoker     Quit date: 1981     Years since quittin.6    Smokeless tobacco: Never Used   Vaping Use    Vaping Use: Never used   Substance Use Topics    Alcohol use: No     Alcohol/week: 0.0 standard drinks    Drug use: No     Allergies   Allergen Reactions    Nabumetone Itching    Oxycodone Rash     Pt has no problem with hydrocodone         Review of Systems -   Constitutional: Negative for weight gain/loss; malaise, fever  Respiratory: Negative for Asthma;  cough and hemoptysis  Cardiovascular: Negative for palpitations,dizziness   Gastrointestinal: Negative for abd.pain; constipation/diarrhea;    Genitourinary: Negative for stones; hematuria; frequency hesitancy  Integumentt: Negative for rash or pruritis  Hematologic/lymphatic: Negative for blood dyscrasia; leukemia/lymphoma  Musculoskeletal: Negative for Connective tissue disease  Neurological:  Negative for Seizure   Behavioral/Psych:Negative for Bipolar disorder, Schizophrenia; Dementia  Endocrine: negative for thyroid, parathyroid disease    Physical Examination:    /74   Pulse 92   Ht 5' 9\" (1.753 m)   Wt 266 lb (120.7 kg)   SpO2 97%   BMI 39.28 kg/m²    HEENT:  Face: Atraumatic, Conjunctiva: Pink; non icteric,  Mucous Memb:  Moist, No thyromegaly or Lymphadenopathy  Respiratory:  Resp Assessment: normal, Resp Auscultation: clear  Cardiovascular: Auscultation: nl S1 & S2, Palpation:  Nl PMI;  No heaves or thrills, JVP:  normal  Abdomen: Soft, non-tender, Normal bowel sounds,  No organomegaly  Extremities: No Cyanosis or Clubbing  Neurological: Oriented to time, place, and person, Non-anxious  Psychiatric: Normal mood and affect  Skin: Warm and dry,  No rash seen     Outpatient Medications Marked as Taking for the 10/7/21 encounter (Office Visit) with Jake Miller MD   Medication Sig Dispense Refill    lisinopril (PRINIVIL;ZESTRIL) 10 MG tablet Take 1 tablet by mouth daily 90 tablet 3    spironolactone (ALDACTONE) 25 MG tablet ON HOLD AS OF 6/4/21 FOR HYPERKALEMIA 25 MG DAILY REPEAT BMP IN 4-5 DAYS. (Patient taking differently: Taking Mon, Wed & Fri only) 30 tablet 3    atorvastatin (LIPITOR) 40 MG tablet TAKE ONE TABLET BY MOUTH ONCE NIGHTLY 90 tablet 2    torsemide (DEMADEX) 20 MG tablet Take 1 tablet by mouth 2 times daily 30 tablet 5    ferrous sulfate (IRON 325) 325 (65 Fe) MG tablet Take 1 tablet by mouth daily (with breakfast) 30 tablet 3    Fluticasone furoate-vilanterol (BREO ELLIPTA) 200-25 MCG/INH AEPB inhaler Inhale 1 puff into the lungs daily      metoprolol succinate (TOPROL XL) 100 MG extended release tablet TAKE ONE TABLET BY MOUTH DAILY 90 tablet 2    aspirin 81 MG chewable tablet Take 1 tablet by mouth daily 30 tablet 3    nitroGLYCERIN (NITROSTAT) 0.4 MG SL tablet up to max of 3 total doses. If no relief after 1 dose, call 911. 25 tablet 3    metFORMIN (GLUCOPHAGE) 500 MG tablet Take 500 mg by mouth daily (with breakfast)  60 tablet 3         Labs:   Lab Results   Component Value Date    HDL 18 2021    HDL 25 2010    LDLCALC 35 2021    TRIG 44 2021         EK2019, Sinus Rhythm  20, sinus rhythm, rate 99  6/3/21, normal rhythm       ECHO: 21  Normal LV size with LVEF of 40%. Global dysfunction. Grade I diastolic  dysfunction with elevated L H filling pressures. Mild mitral regurgitation is present. The left atrium is moderately dilated. Trivial aortic regurgitation is present. The right ventricle is dilated. Right ventricular systolic function is mildly reduced. Mild tricuspid regurgitation. The right atrium is moderately dilated. ECHO: 2018  Normal LV size with global systolic dysfunction. EF 35%. Distal anterior  hypokinesis. The left atrium is severely dilated. Normal right ventricular size and function. Trivial Mitral and Tricuspid regurgitation. [facility] angiogram  & Intervention: 2018  1. Omelia Sovereign is branch vessel disease in this codominant circulation.  The arteries are large. 2.  The left main is free of disease. 3.  LAD is a large vessel, it reaches the apex.  D1: 80- 90% ostial lesion.  D2: subtotally occluded and is the culprit vessel. 4.  The left circumflex artery is free of disease.  It is a codominant vessel.   5.  The codominant right also has no major obstructive disease.     Successful PCI and stenting of subtotally occluded diagonal  branch.  Lesion reduced from 100% to 0%.  2.5 x 23-mm JOHNSON stent treatment, procedures, and medical decision making performed by me.

## 2021-10-07 ENCOUNTER — OFFICE VISIT (OUTPATIENT)
Dept: CARDIOLOGY CLINIC | Age: 76
End: 2021-10-07
Payer: MEDICARE

## 2021-10-07 VITALS
OXYGEN SATURATION: 97 % | WEIGHT: 266 LBS | BODY MASS INDEX: 39.4 KG/M2 | SYSTOLIC BLOOD PRESSURE: 122 MMHG | HEIGHT: 69 IN | HEART RATE: 92 BPM | DIASTOLIC BLOOD PRESSURE: 74 MMHG

## 2021-10-07 DIAGNOSIS — I25.5 ISCHEMIC CARDIOMYOPATHY: ICD-10-CM

## 2021-10-07 DIAGNOSIS — I50.22 CHRONIC SYSTOLIC HEART FAILURE (HCC): ICD-10-CM

## 2021-10-07 DIAGNOSIS — N28.9 RENAL INSUFFICIENCY: ICD-10-CM

## 2021-10-07 DIAGNOSIS — I25.10 CAD IN NATIVE ARTERY: Primary | ICD-10-CM

## 2021-10-07 DIAGNOSIS — Z86.39 H/O DIABETES MELLITUS: ICD-10-CM

## 2021-10-07 DIAGNOSIS — Z85.6 H/O LEUKEMIA: ICD-10-CM

## 2021-10-07 PROCEDURE — 99214 OFFICE O/P EST MOD 30 MIN: CPT | Performed by: INTERNAL MEDICINE

## 2021-10-07 RX ORDER — LISINOPRIL 10 MG/1
10 TABLET ORAL DAILY
Qty: 90 TABLET | Refills: 3 | Status: SHIPPED | OUTPATIENT
Start: 2021-10-07 | End: 2022-10-13 | Stop reason: ALTCHOICE

## 2021-10-07 RX ORDER — ROSUVASTATIN CALCIUM 40 MG/1
40 TABLET, COATED ORAL NIGHTLY
Qty: 90 TABLET | Refills: 3 | Status: SHIPPED | OUTPATIENT
Start: 2021-10-07 | End: 2022-10-13

## 2021-10-07 NOTE — PATIENT INSTRUCTIONS
Patient Education        Dilated Cardiomyopathy: Care Instructions  Your Care Instructions     Dilated cardiomyopathy is a condition that weakens your heart muscle and causes it to stretch, or dilate. When your heart muscle is weak, it can't pump out blood as well as it should. More blood stays in your heart after each heartbeat. As more blood fills and stays in the heart, the heart muscle stretches even more and gets even weaker. Many things can cause dilated cardiomyopathy. It can be caused by another disease or condition. Some people have a family history of dilated cardiomyopathy. For some people, the cause is not known. You may not have any symptoms at first. Or you may have symptoms, such as feeling very tired or weak. If your heart gets weaker, you may develop heart failure. Heart failure means that your heart muscle doesn't pump as much blood as your body needs. If this happens, you will feel other symptoms such as shortness of breath or trouble breathing when you lie down. The goal of treatment is to slow the disease and help you feel better. You may also have treatment for the cause of the cardiomyopathy. You will probably take a few medicines. If your doctor thinks it will help your heart and prevent problems, you may get a device such as a pacemaker. Self-care is another important part of your treatment. It includes the things you can do every day to feel better and stay as healthy as possible. Follow-up care is a key part of your treatment and safety. Be sure to make and go to all appointments, and call your doctor if you are having problems. It's also a good idea to know your test results and keep a list of the medicines you take. How can you care for yourself at home? Medicines    · Be safe with medicines. Take your medicines exactly as prescribed. Call your doctor if you think you are having a problem with your medicine.  You may be taking some of the following medicines:  ? Angiotensin-converting enzyme (ACE) inhibitors or angiotensin II receptor blockers (ARBs). These make it easier for blood to flow. ? Diuretics. These help remove excess fluid from the body. ? Beta-blockers. These slow the heart rate and can help the heart fill with blood more completely. Heart-healthy lifestyle    · Be active. Exercise regularly, but don't exercise too hard. If you aren't already active, your doctor may want you to start exercising. But don't start until you have talked with your doctor to make an exercise program that is safe for you.     · Do not smoke. Smoking can make a heart condition worse. If you need help quitting, talk to your doctor about stop-smoking programs and medicines. These can increase your chances of quitting for good.     · Eat a heart-healthy diet.     · Stay at a healthy weight. Lose weight if you need to.     · If your doctor recommends it, limit sodium. This helps keep fluid from building up in your body. It may help you feel better.     · Manage other health problems. These include diabetes, high blood pressure, and high cholesterol. If you think you may have a problem with alcohol or drug use, talk to your doctor. Weight monitoring    · Weigh yourself without clothing at the same time each day. Record your weight. Call your doctor if you have a sudden weight gain, such as more than 2 to 3 pounds in a day or 5 pounds in a week. (Your doctor may suggest a different range of weight gain.) A sudden weight gain may mean that your condition is getting worse. When should you call for help? Call 911 anytime you think you may need emergency care. For example, call if:    · You have symptoms of sudden heart failure. These may include:  ? Severe trouble breathing. ? A fast or irregular heartbeat. ? Coughing up pink, foamy mucus. ? Passing out.    Call your doctor now or seek immediate medical care if:    · You have new or changed symptoms of heart failure,

## 2021-12-22 ENCOUNTER — HOSPITAL ENCOUNTER (EMERGENCY)
Age: 76
Discharge: HOME | End: 2021-12-22
Attending: EMERGENCY MEDICINE
Payer: MEDICARE

## 2021-12-22 VITALS
WEIGHT: 256 LBS | HEART RATE: 114 BPM | DIASTOLIC BLOOD PRESSURE: 94 MMHG | SYSTOLIC BLOOD PRESSURE: 164 MMHG | OXYGEN SATURATION: 94 % | TEMPERATURE: 99.9 F | BODY MASS INDEX: 36.65 KG/M2 | HEIGHT: 70 IN | RESPIRATION RATE: 16 BRPM

## 2021-12-22 DIAGNOSIS — H11.32 SUBCONJUNCTIVAL HEMORRHAGE OF LEFT EYE: Primary | ICD-10-CM

## 2021-12-22 PROCEDURE — 6370000000 HC RX 637 (ALT 250 FOR IP): Performed by: EMERGENCY MEDICINE

## 2021-12-22 PROCEDURE — 99282 EMERGENCY DEPT VISIT SF MDM: CPT

## 2021-12-22 RX ORDER — SULFACETAMIDE SODIUM 100 MG/ML
2 SOLUTION/ DROPS OPHTHALMIC
Qty: 5 ML | Refills: 0 | Status: SHIPPED | OUTPATIENT
Start: 2021-12-22 | End: 2021-12-29

## 2021-12-22 RX ORDER — KETOTIFEN FUMARATE 0.35 MG/ML
1 SOLUTION/ DROPS OPHTHALMIC 2 TIMES DAILY
Qty: 5 ML | Refills: 0 | Status: SHIPPED | OUTPATIENT
Start: 2021-12-22 | End: 2022-01-01

## 2021-12-22 RX ORDER — TETRACAINE HYDROCHLORIDE 5 MG/ML
1 SOLUTION OPHTHALMIC ONCE
Status: COMPLETED | OUTPATIENT
Start: 2021-12-22 | End: 2021-12-22

## 2021-12-22 RX ADMIN — TETRACAINE HYDROCHLORIDE 1 DROP: 5 SOLUTION OPHTHALMIC at 15:49

## 2021-12-22 RX ADMIN — FLUORESCEIN SODIUM 1 MG: 1 STRIP OPHTHALMIC at 15:48

## 2021-12-22 NOTE — ED PROVIDER NOTES
eMERGENCY dEPARTMENT eNCOUnter      Pt Name: Kameron Meléndez  MRN: [id number]  [place] 1945  Date of evaluation: 12/22/2021  Provider: Sofi Cardona MD     [address] [place] [address]       Chief Complaint   Patient presents with    Eye Problem     blood in eye, states he had some hair fall in his eye on monday, later that eveing he looked in mirror and saw the blood, denies pain or difficulty with vision         HISTORY OF PRESENT ILLNESS   (Location/Symptom, Timing/Onset,Context/Setting, Quality, Duration, Modifying Factors, Severity) Note limiting factors. HPI    Kameron Meléndez is a 77 y.o. male who presents to the emergency department with red eye for2 day. [place] [facility] [place] and [place] a [place]irc[place] and had some hair that fell on Monday. Try to get it out yesterday. And woke up this morning his whole eye was red and bloody. Patient denies any ocular changes besides that. There is no pain. Patient denies any blurry vision. No visual changes. Is able to walk okay. No visual deformity. Nursing Notes were reviewed. REVIEW OFSYSTEMS    (2+ for level 4; 10+ for level 5)   Review of Systems    General: No fevers, chills or night sweats, No weight loss    Head:  No Sore throat,  No Ear Pain    Chest:  Nontender. No Cough, No SOB,  Chest Pain    GI: No abdominal pain or vomiting    : No dysuria or hematuria    Musculoskeletal: No unrelenting pain or night pain    Neurologic: No bowel or bladder incontinence, No saddle anesthesia, No leg weakness    All other systems reviewed and are negative.         PAST MEDICAL HISTORY     Past Medical History:   Diagnosis Date    A-fib ([address]     Anemia     Arthritis     Cancer ([address] 2005    hairy cell leukemia    CHF (congestive heart failure) ([address]     Colon polyps 08/2010    Diabetes mellitus ([address]     Hepatitis C     Hypertension     Obesity     Splenomegaly 2010       SURGICAL HISTORY       Past Surgical History:   Procedure Laterality Date    BONE MARROW BIOPSY      COLONOSCOPY      LIVER BIOPSY      TOTAL KNEE ARTHROPLASTY Left 2017       CURRENT MEDICATIONS       Discharge Medication List as of 2021  3:40 PM      CONTINUE these medications which have NOT CHANGED    Details   lisinopril (PRINIVIL;ZESTRIL) 10 MG tablet Take 1 tablet by mouth daily, Disp-90 tablet, R-3Normal      rosuvastatin (CRESTOR) 40 MG tablet Take 1 tablet by mouth nightly, Disp-90 tablet, R-3Normal      spironolactone (ALDACTONE) 25 MG tablet ON HOLD AS OF 21 FOR HYPERKALEMIA 25 MG DAILY REPEAT BMP IN 4-5 DAYS., Disp-30 tablet, R-3NO PRINT      torsemide (DEMADEX) 20 MG tablet Take 1 tablet by mouth 2 times daily, Disp-30 tablet, R-5Normal      ferrous sulfate (IRON 325) 325 (65 Fe) MG tablet Take 1 tablet by mouth daily (with breakfast), Disp-30 tablet, R-3Normal      Fluticasone furoate-vilanterol (BREO ELLIPTA) 200-25 MCG/INH AEPB inhaler Inhale 1 puff into the lungs dailyHistorical Med      metoprolol succinate (TOPROL XL) 100 MG extended release tablet TAKE ONE TABLET BY MOUTH DAILY, Disp-90 tablet, R-2Normal      aspirin 81 MG chewable tablet Take 1 tablet by mouth daily, Disp-30 tablet, R-3Normal      nitroGLYCERIN (NITROSTAT) 0.4 MG SL tablet up to max of 3 total doses. If no relief after 1 dose, call 911., Disp-25 tablet, R-3Normal      metFORMIN (GLUCOPHAGE) 500 MG tablet Take 500 mg by mouth daily (with breakfast) , Disp-60 tablet, R-3Historical Med             ALLERGIES     Nabumetone and Oxycodone    FAMILY HISTORY     No family history on file.      SOCIAL HISTORY       Social History     Socioeconomic History    Marital status: Single     Spouse name: Not on file    Number of children: Not on file    Years of education: Not on file    Highest education level: Not on file   Occupational History    Not on file   Tobacco Use    Smoking status: Former Smoker     Quit date: 1981     Years since quittin.8    Smokeless tobacco: Never Used   Vaping Use    Vaping Use: Never used   Substance and Sexual Activity    Alcohol use: No     Alcohol/week: 0.0 standard drinks    Drug use: No    Sexual activity: Not on file   Other Topics Concern    Not on file   Social History Narrative    Not on file     Social Determinants of Health     Financial Resource Strain:     Difficulty of Paying Living Expenses: Not on file   Food Insecurity:     Worried About Running Out of Food in the Last Year: Not on file    Viraj of Food in the Last Year: Not on file   Transportation Needs:     Lack of Transportation (Medical): Not on file    Lack of Transportation (Non-Medical): Not on file   Physical Activity:     Days of Exercise per Week: Not on file    Minutes of Exercise per Session: Not on file   Stress:     Feeling of Stress : Not on file   Social Connections:     Frequency of Communication with Friends and Family: Not on file    Frequency of Social Gatherings with Friends and Family: Not on file    Attends Taoist Services: Not on file    Active Member of [employer] or Organizations: Not on file    Attends Club or Organization Meetings: Not on file    Marital Status: Not on file   Intimate Partner Violence:     Fear of Current or Ex-Partner: Not on file    Emotionally Abused: Not on file    Physically Abused: Not on file    Sexually Abused: Not on file   Housing Stability:     Unable to Pay for Housing in the Last Year: Not on file    Number of [place] in the Last Year: Not on file    Unstable Housing in the Last Year: Not on file       SCREENINGS           PHYSICAL EXAM    (up to 7 for level 4, 8 or more for level 5)     ED Triage Vitals [12/22/21 1405]   BP Temp Temp Source Pulse Resp SpO2 Height Weight   (!) 164/94 99.9 °F (37.7 °C) Oral 114 16 94 % 5' 10\" (1.778 m) 256 lb (116.1 kg)       Physical Exam    General: Alert and awake ×3. Nontoxic appearance.   Well-developed well-nourished 78-year-old elderly male in no acute distress  HEENT: Normocephalic atraumatic. Neck is supple. Airway intact. No adenopathy. Pupils equal and reactive to light conjunctiva is bloody consistent with subarachnoid hemorrhage. It was 100% surrounding whole area of the eyeball. There is no hyphema. Pupils reactive. Fluorescein stain reveals no corneal abrasion. No foreign body noted. Cardiac: Regular rate and rhythm with no murmurs rubs or gallops  Pulmonary: Lungs are clear in all lung fields. No wheezing. No Rales. Abdomen: Soft and nontender. Negative hepatosplenomegaly. Bowel sounds are active  Extremities: Moving all extremities. No calf tenderness. Peripheral pulses all intact  Skin: No skin lesions. No rashes  Neurologic: Cranial nerves II through XII was grossly intact. Nonfocal neurological exam  Psychiatric: Patient is pleasant. Mood is appropriate. DIAGNOSTIC RESULTS     EKG (Per Emergency Physician):       RADIOLOGY (Per Emergency Physician): Interpretation per the Radiologist below, if available at the time of this note:  No results found. ED BEDSIDE ULTRASOUND:   Performed by ED Physician - none    LABS:  Labs Reviewed - No data to display     All other labs were within normal range or not returned as of this dictation. Procedures      EMERGENCY DEPARTMENT COURSE and DIFFERENTIAL DIAGNOSIS/MDM:   Vitals:    Vitals:    12/22/21 1405   BP: (!) 164/94   Pulse: 114   Resp: 16   Temp: 99.9 °F (37.7 °C)   TempSrc: Oral   SpO2: 94%   Weight: 256 lb (116.1 kg)   Height: 5' 10\" (1.778 m)       Medications   fluorescein ophthalmic strip 1 mg (1 mg Ophthalmic Given 12/22/21 1548)   tetracaine (TETRAVISC) 0.5 % ophthalmic solution 1 drop (1 drop Left Eye Given 12/22/21 1549)       MDM. Patient is a healthy 67-year-old with some localized trauma to the left thigh from him rubbing at it. Patient was try to get a hair out. Ever since then he noticed their eyes are red and injected.   Patient have obvious conjunctival hemorrhage. There is no hyphema. Patient reassured follow-up with ophthalmology or [facility] referral.  Patient placed on Zaditor for antiinflammation properties as well as Bleph-10 antibiotic at this time. Patient discharged in good condition follow-up. REVAL:         CRITICAL CARE TIME   Total CriticalCare time was 0 minutes, excluding separately reportable procedures. There was a high probability of clinically significant/life threatening deterioration in the patient's condition which required my urgent intervention. CONSULTS:  None    PROCEDURES:  Unless otherwise noted below, none     [unfilled]    FINAL IMPRESSION      1. Subconjunctival hemorrhage of left eye          DISPOSITION/PLAN   DISPOSITION Decision To Discharge 12/22/2021 03:36:35 PM      PATIENT REFERRED TO:  [address] [place] [address]  [phone]    Schedule an appointment as soon as possible for a visit in 1 week  If symptoms worsen      DISCHARGE MEDICATIONS:  Discharge Medication List as of 12/22/2021  3:40 PM      START taking these medications    Details   sulfacetamide (BLEPH-10) 10 % ophthalmic solution Place 2 drops into the left eye every 3 hours for 7 days, Disp-5 mL, R-0Normal      ketotifen (ZADITOR) 0.025 % ophthalmic solution Place 1 drop into the left eye 2 times daily for 10 days, Disp-5 mL, R-0Normal                (Please note:  Portions of this note were completed with a voice recognition program.Efforts were made to edit the dictations but occasionally words and phrases are mis-transcribed.)  Form v2016. J.5-cn    Julio KING MD (electronically signed)  Emergency Medicine Provider       Markell Garcia MD  12/22/21 3917

## 2022-01-03 RX ORDER — METOPROLOL SUCCINATE 100 MG/1
TABLET, EXTENDED RELEASE ORAL
Qty: 60 TABLET | Refills: 2 | Status: SHIPPED | OUTPATIENT
Start: 2022-01-03 | End: 2022-02-08 | Stop reason: SDUPTHER

## 2022-02-08 RX ORDER — METOPROLOL SUCCINATE 100 MG/1
TABLET, EXTENDED RELEASE ORAL
Qty: 60 TABLET | Refills: 2 | Status: SHIPPED | OUTPATIENT
Start: 2022-02-08

## 2022-05-19 RX ORDER — NITROGLYCERIN 0.4 MG/1
TABLET SUBLINGUAL
Qty: 25 TABLET | Refills: 3 | Status: SHIPPED | OUTPATIENT
Start: 2022-05-19

## 2022-05-19 NOTE — TELEPHONE ENCOUNTER
Medication Refill    Medication needing refilled: NITROGLYCERIN    Dosage of the medication:0.4mg    How are you taking this medication (QD, BID, TID, QID, PRN):up to max of 3 total doses.  If no relief after 1 dose, call 911.    30 or 90 day supply called in: 25tablets    When will you run out of your medication:now    Which Pharmacy are we sending the medication to?:[facility] [id number] [address] [phone]     [facility] # [phone]

## 2022-10-13 ENCOUNTER — OFFICE VISIT (OUTPATIENT)
Dept: CARDIOLOGY CLINIC | Age: 77
End: 2022-10-13
Payer: MEDICARE

## 2022-10-13 VITALS
OXYGEN SATURATION: 95 % | HEIGHT: 69 IN | WEIGHT: 284 LBS | DIASTOLIC BLOOD PRESSURE: 84 MMHG | SYSTOLIC BLOOD PRESSURE: 138 MMHG | HEART RATE: 102 BPM | BODY MASS INDEX: 42.06 KG/M2

## 2022-10-13 DIAGNOSIS — I25.5 ISCHEMIC CARDIOMYOPATHY: ICD-10-CM

## 2022-10-13 DIAGNOSIS — Z86.39 H/O DIABETES MELLITUS: ICD-10-CM

## 2022-10-13 DIAGNOSIS — I25.10 CAD IN NATIVE ARTERY: Primary | ICD-10-CM

## 2022-10-13 DIAGNOSIS — N28.9 RENAL INSUFFICIENCY: ICD-10-CM

## 2022-10-13 DIAGNOSIS — I50.22 CHRONIC SYSTOLIC HEART FAILURE (HCC): ICD-10-CM

## 2022-10-13 PROCEDURE — 1123F ACP DISCUSS/DSCN MKR DOCD: CPT | Performed by: INTERNAL MEDICINE

## 2022-10-13 PROCEDURE — 99214 OFFICE O/P EST MOD 30 MIN: CPT | Performed by: INTERNAL MEDICINE

## 2022-10-13 PROCEDURE — 93000 ELECTROCARDIOGRAM COMPLETE: CPT | Performed by: INTERNAL MEDICINE

## 2022-10-13 RX ORDER — MELATONIN
1000 DAILY
Qty: 90 TABLET | Refills: 1 | COMMUNITY
Start: 2022-10-13

## 2022-10-13 RX ORDER — TORSEMIDE 20 MG/1
20 TABLET ORAL DAILY
Qty: 30 TABLET | Refills: 5 | COMMUNITY
Start: 2022-10-13

## 2022-10-13 RX ORDER — ATORVASTATIN CALCIUM 40 MG/1
40 TABLET, FILM COATED ORAL DAILY
COMMUNITY

## 2022-10-13 RX ORDER — LOSARTAN POTASSIUM 50 MG/1
50 TABLET ORAL DAILY
Qty: 90 TABLET | Refills: 1 | COMMUNITY
Start: 2022-10-13

## 2022-10-13 RX ORDER — ALBUTEROL SULFATE 90 UG/1
2 AEROSOL, METERED RESPIRATORY (INHALATION) EVERY 6 HOURS PRN
Qty: 18 G | Refills: 3 | COMMUNITY
Start: 2022-10-13

## 2022-10-13 NOTE — PROGRESS NOTES
[address]  Cardiology Progress Note      Armando Leblanc  1945, 69 y.o.      CC: \" Everything is good. \"     Paola Card:      HPI:   This is a 69 y.o. male  with history of diabetes, hepatitis C and basal cell leukemia who came in with chest pain and was found to have CHF as well as large diagonal occlusion that was opened and stented in 2018. EF at that time was 35%. He was hospitalized 5/3-21 and treated for acute resp failure, acute heart failure and anemia. Treated with Lasix and diuresed well. [facility]. Patient returns for yearly follow up with no new cardiac complaints. Using a cane for ambulation. States his breathing has been comfortable and he denies any chest pains. No difficulty laying flat to sleep. His main complaint today is knee pain. Weight is up since our last visit. He has remained compliant with medical therapy and tolerating. Denies any dyspnea, chest pain, orthopnea, PND, palpitations and dizziness. Past Medical History:   Diagnosis Date    A-fib ([facility]     Anemia     Arthritis     Cancer ([facility]     hairy cell leukemia    CHF (congestive heart failure) ([facility]     Colon polyps 2010    Diabetes mellitus ([facility]     Hepatitis C     Hypertension     Obesity     Splenomegaly       Past Surgical History:   Procedure Laterality Date    BONE MARROW BIOPSY      COLONOSCOPY      LIVER BIOPSY      TOTAL KNEE ARTHROPLASTY Left 2017      No family history on file.    Social History     Tobacco Use    Smoking status: Former     Types: Cigarettes     Quit date: 1981     Years since quittin.6    Smokeless tobacco: Never   Vaping Use    Vaping Use: Never used   Substance Use Topics    Alcohol use: No     Alcohol/week: 0.0 standard drinks    Drug use: No     Allergies   Allergen Reactions    Nabumetone Itching    Oxycodone Rash     Pt has no problem with hydrocodone         Review of Systems - Constitutional: Negative for weight gain/loss; malaise, fever  Respiratory: Negative for Asthma;  cough and hemoptysis  Cardiovascular: Negative for palpitations,dizziness   Gastrointestinal: Negative for abd.pain; constipation/diarrhea;    Genitourinary: Negative for stones; hematuria; frequency hesitancy  Integumentt: Negative for rash or pruritis  Hematologic/lymphatic: Negative for blood dyscrasia; leukemia/lymphoma  Musculoskeletal: Negative for Connective tissue disease  Neurological:  Negative for Seizure   Behavioral/Psych:Negative for Bipolar disorder, Schizophrenia; Dementia  Endocrine: negative for thyroid, parathyroid disease    Physical Examination:    /84   Pulse (!) 102   Ht 5' 9\" (1.753 m)   Wt 284 lb (128.8 kg)   SpO2 95%   BMI 41.94 kg/m²    HEENT:  Face: Atraumatic, Conjunctiva: Pink; non icteric,  Mucous Memb:  Moist, No thyromegaly or Lymphadenopathy  Respiratory:  Resp Assessment: normal, Resp Auscultation: clear  Cardiovascular: Auscultation: nl S1 & S2, Palpation:  Nl PMI; No heaves or thrills, JVP:  normal  Abdomen: Soft, non-tender, Normal bowel sounds,  No organomegaly  Extremities: No Cyanosis or Clubbing  Neurological: Oriented to time, place, and person, Non-anxious  Psychiatric: Normal mood and affect  Skin: Warm and dry,  No rash seen     Outpatient Medications Marked as Taking for the 10/13/22 encounter (Office Visit) with Carolyn Hsieh MD   Medication Sig Dispense Refill    vitamin D 25 MCG (1000 UT) CAPS Take 25 capsules by mouth Take  1 capsule by mouth daily      atorvastatin (LIPITOR) 40 MG tablet Take 40 mg by mouth daily Take 40 mg by mouth daily      nitroGLYCERIN (NITROSTAT) 0.4 MG SL tablet up to max of 3 total doses.  If no relief after 1 dose, call 911. 25 tablet 3    metoprolol succinate (TOPROL XL) 100 MG extended release tablet TAKE ONE TABLET BY MOUTH DAILY 60 tablet 2    torsemide (DEMADEX) 20 MG tablet Take 1 tablet by mouth 2 times daily 30 tablet 5 Fluticasone furoate-vilanterol (BREO ELLIPTA) 200-25 MCG/INH AEPB inhaler Inhale 1 puff into the lungs daily      aspirin 81 MG chewable tablet Take 1 tablet by mouth daily 30 tablet 3         Labs:   Lab Results   Component Value Date/Time    HDL 18 2021 04:39 AM    HDL 25 2010 01:13 PM    LDLCALC 35 2021 04:39 AM    TRIG 44 2021 04:39 AM         EK2019, Sinus Rhythm  20, sinus rhythm, rate 99  6/3/21, normal rhythm       ECHO: 21  Normal LV size with LVEF of 40%. Global dysfunction. Grade I diastolic  dysfunction with elevated L H filling pressures. Mild mitral regurgitation is present. The left atrium is moderately dilated. Trivial aortic regurgitation is present. The right ventricle is dilated. Right ventricular systolic function is mildly reduced. Mild tricuspid regurgitation. The right atrium is moderately dilated. ECHO: 2018  Normal LV size with global systolic dysfunction. EF 35%. Distal anterior  hypokinesis. The left atrium is severely dilated. Normal right ventricular size and function. Trivial Mitral and Tricuspid regurgitation. [facility] angiogram  & Intervention: 2018  1. There is branch vessel disease in this codominant circulation. The arteries are large. 2.  The left main is free of disease. 3.  LAD is a large vessel, it reaches the apex. D1: 80- 90% ostial lesion. D2: subtotally occluded and is the culprit vessel. 4.  The left circumflex artery is free of disease. It is a codominant vessel. 5.  The codominant right also has no major obstructive disease. Successful PCI and stenting of subtotally occluded diagonal  branch. Lesion reduced from 100% to 0%. 2.5 x 23-mm JOHNSON stent was  used. P.S. We initially accessed the groin to the right femoral.  However, we  probably entered dissection plane as we could not advance the wire. We  therefore accessed the left femoral artery.       ASSESSMENT AND PLAN:      CAD / STEMI 12/24/18  High lateral infarct secondary to occlusion of the D2 branch. This was ballooned and stented using 2.5×23 mm JOHNSON stent. D1 also has 80% ostial lesion. Other major vessels are free of disease; EF 35%  LDL 85 (9/1/20); switch to rosuvastatin 40 mg daily   LDL 68 (8/2022) at [facility] he did not switch to rosuvastatin and continues to take atorvastatin  He denies any angina     Ischemic cardiomyopathy / CHF  EF 35% that improved to 40% by Echo 5/2021, therefore no indication for ICD. Continue current medical therapy; Aldactone, Demadex, Toprol XL and Losartan  No ACEi with Renal insufficiency managed by Dr. Nina Faustin take additional Torsemide for 1-2 days as needed for increased VILLARREAL, weight gain of 2-3 pounds in a day, 5 pounds in a week  Reinforced fluid and sodium restriction     Hematologic Disorder  Basal cell leukemia  Managed by Dr. Lawrence Tolbert    Type 2 DM  Managed by PCP  On Metformin    Renal insufficiency  Creatinine 2.08 (8/16/22)  Managed By Dr. Olivier Baker     Follow up in 1 year  or sooner if needed      Thank you very much for allowing me to participate in the care of your patient. Please do not hesitate to contact me if you have any questions. Sincerely,    Samuel Renae M.D  [facility] [address] [place] [address], [address] [address]  Ph: [phone]  Fax: [phone]      This note was scribed in the presence of Dr. Samuel Renae MD by Kenia Little RN  Physician Attestation:  The scribes documentation has been prepared under my direction and personally reviewed by me in its entirety. I confirm that the note above accurately reflects all work, treatment, procedures, and medical decision making performed by me.

## 2022-11-30 ENCOUNTER — HOSPITAL ENCOUNTER (OUTPATIENT)
Dept: ULTRASOUND IMAGING | Age: 77
Discharge: HOME | End: 2022-11-30
Payer: MEDICARE

## 2022-11-30 DIAGNOSIS — N18.32 CHRONIC KIDNEY DISEASE (CKD) STAGE G3B/A1, MODERATELY DECREASED GLOMERULAR FILTRATION RATE (GFR) BETWEEN 30-44 ML/MIN/1.73 SQUARE METER AND ALBUMINURIA CREATININE RATIO LESS THAN 30 MG/G (HCC): ICD-10-CM

## 2022-11-30 PROCEDURE — 76770 US EXAM ABDO BACK WALL COMP: CPT

## 2023-04-25 RX ORDER — ROSUVASTATIN CALCIUM 40 MG/1
TABLET, COATED ORAL
Qty: 90 TABLET | Refills: 3 | OUTPATIENT
Start: 2023-04-25

## 2023-10-26 ENCOUNTER — OFFICE VISIT (OUTPATIENT)
Dept: CARDIOLOGY CLINIC | Age: 78
End: 2023-10-26
Payer: MEDICARE

## 2023-10-26 VITALS
OXYGEN SATURATION: 97 % | HEIGHT: 69 IN | BODY MASS INDEX: 40.23 KG/M2 | WEIGHT: 271.6 LBS | HEART RATE: 100 BPM | DIASTOLIC BLOOD PRESSURE: 70 MMHG | SYSTOLIC BLOOD PRESSURE: 140 MMHG

## 2023-10-26 DIAGNOSIS — R00.0 TACHYCARDIA: Primary | ICD-10-CM

## 2023-10-26 PROCEDURE — 1123F ACP DISCUSS/DSCN MKR DOCD: CPT | Performed by: INTERNAL MEDICINE

## 2023-10-26 PROCEDURE — 93000 ELECTROCARDIOGRAM COMPLETE: CPT | Performed by: INTERNAL MEDICINE

## 2023-10-26 PROCEDURE — 3077F SYST BP >= 140 MM HG: CPT | Performed by: INTERNAL MEDICINE

## 2023-10-26 PROCEDURE — 99214 OFFICE O/P EST MOD 30 MIN: CPT | Performed by: INTERNAL MEDICINE

## 2023-10-26 PROCEDURE — 3078F DIAST BP <80 MM HG: CPT | Performed by: INTERNAL MEDICINE

## 2023-10-26 RX ORDER — DULAGLUTIDE 0.75 MG/.5ML
0.75 INJECTION, SOLUTION SUBCUTANEOUS
COMMUNITY
Start: 2022-10-26

## 2023-10-26 NOTE — PROGRESS NOTES
[facility]  Cardiology Progress Note      Laya Nieves  1945, 69 y.o.      CC: Coronary artery disease and ischemic LV dysfunction EF of 40%    PCP: Love Caruso:      HPI:   This is a 69 y.o. male  with history of DM, hepatitis C and basal cell leukemia who came in with chest pain and was found to have CHF as well as large diagonal occlusion that was opened and stented in 2018. EF at that time was 35%. He was hospitalized 5/3-21 and treated for acute resp failure, acute heart failure and anemia. Doing well no chest pain shortness of breath orthopnea PND or palpitations    Past Medical History:   Diagnosis Date    A-fib ([address])     Anemia     Arthritis     Cancer ([address])     hairy cell leukemia    CHF (congestive heart failure) ([address])     Colon polyps 2010    Diabetes mellitus ([address])     Hepatitis C     Hypertension     Obesity     Splenomegaly       Past Surgical History:   Procedure Laterality Date    BONE MARROW BIOPSY      COLONOSCOPY      LIVER BIOPSY      TOTAL KNEE ARTHROPLASTY Left 2017      No family history on file.    Social History     Tobacco Use    Smoking status: Former     Types: Cigarettes     Quit date: 1981     Years since quittin.7    Smokeless tobacco: Never   Vaping Use    Vaping Use: Never used   Substance Use Topics    Alcohol use: No     Alcohol/week: 0.0 standard drinks of alcohol    Drug use: No     Allergies   Allergen Reactions    Nabumetone Itching    Oxycodone Rash     Pt has no problem with hydrocodone         Review of Systems -   Constitutional: Negative for weight gain/loss; malaise, fever  Respiratory: Negative for Asthma;  cough and hemoptysis  Cardiovascular: Negative for palpitations,dizziness   Gastrointestinal: Negative for abd.pain; constipation/diarrhea;    Genitourinary: Negative for stones; hematuria; frequency hesitancy  Integumentt: Negative for rash or pruritis  Hematologic/lymphatic: Negative for blood

## 2024-02-01 ENCOUNTER — HOSPITAL ENCOUNTER (INPATIENT)
Age: 79
LOS: 4 days | Discharge: HOME | DRG: 683 | End: 2024-02-05
Attending: INTERNAL MEDICINE | Admitting: INTERNAL MEDICINE
Payer: MEDICARE

## 2024-02-01 ENCOUNTER — APPOINTMENT (OUTPATIENT)
Dept: CT IMAGING | Age: 79
DRG: 683 | End: 2024-02-01
Payer: MEDICARE

## 2024-02-01 DIAGNOSIS — N17.9 ACUTE KIDNEY INJURY SUPERIMPOSED ON CKD (HCC): ICD-10-CM

## 2024-02-01 DIAGNOSIS — C61 PROSTATE CANCER (HCC): ICD-10-CM

## 2024-02-01 DIAGNOSIS — R33.8 ACUTE URINARY RETENTION: Primary | ICD-10-CM

## 2024-02-01 DIAGNOSIS — N18.9 ACUTE KIDNEY INJURY SUPERIMPOSED ON CKD (HCC): ICD-10-CM

## 2024-02-01 LAB
ANION GAP SERPL CALCULATED.3IONS-SCNC: 14 MMOL/L (ref 3–16)
ANISOCYTOSIS BLD QL SMEAR: ABNORMAL
BACTERIA URNS QL MICRO: ABNORMAL /HPF
BASOPHILS # BLD: 0 K/UL (ref 0–0.2)
BASOPHILS NFR BLD: 0 %
BILIRUB UR QL STRIP.AUTO: NEGATIVE
BUN SERPL-MCNC: 48 MG/DL (ref 7–20)
BURR CELLS BLD QL SMEAR: ABNORMAL
BURR CELLS BLD QL SMEAR: ABNORMAL
CALCIUM SERPL-MCNC: 8.4 MG/DL (ref 8.3–10.6)
CHLORIDE SERPL-SCNC: 107 MMOL/L (ref 99–110)
CLARITY UR: CLEAR
CO2 SERPL-SCNC: 16 MMOL/L (ref 21–32)
COLOR UR: YELLOW
CREAT SERPL-MCNC: 5.1 MG/DL (ref 0.8–1.3)
DEPRECATED RDW RBC AUTO: 20.3 % (ref 12.4–15.4)
EOSINOPHIL # BLD: 0 K/UL (ref 0–0.6)
EOSINOPHIL NFR BLD: 0 %
EPI CELLS #/AREA URNS AUTO: 1 /HPF (ref 0–5)
GFR SERPLBLD CREATININE-BSD FMLA CKD-EPI: 11 ML/MIN/{1.73_M2}
GLUCOSE BLD-MCNC: 240 MG/DL (ref 70–99)
GLUCOSE SERPL-MCNC: 144 MG/DL (ref 70–99)
GLUCOSE UR STRIP.AUTO-MCNC: NEGATIVE MG/DL
HCT VFR BLD AUTO: 33.4 % (ref 40.5–52.5)
HGB BLD-MCNC: 10.6 G/DL (ref 13.5–17.5)
HGB UR QL STRIP.AUTO: ABNORMAL
HYALINE CASTS #/AREA URNS AUTO: 0 /LPF (ref 0–8)
KETONES UR STRIP.AUTO-MCNC: NEGATIVE MG/DL
LACTATE BLDV-SCNC: 0.8 MMOL/L (ref 0.4–1.9)
LACTATE BLDV-SCNC: 0.9 MMOL/L (ref 0.4–1.9)
LEUKOCYTE ESTERASE UR QL STRIP.AUTO: ABNORMAL
LYMPHOCYTES # BLD: 2.7 K/UL (ref 1–5.1)
LYMPHOCYTES NFR BLD: 45 %
MCH RBC QN AUTO: 22 PG (ref 26–34)
MCHC RBC AUTO-ENTMCNC: 31.8 G/DL (ref 31–36)
MCV RBC AUTO: 69.3 FL (ref 80–100)
MICROCYTES BLD QL SMEAR: ABNORMAL
MONOCYTES # BLD: 0.3 K/UL (ref 0–1.3)
MONOCYTES NFR BLD: 6 %
NEUTROPHILS # BLD: 2.6 K/UL (ref 1.7–7.7)
NEUTROPHILS NFR BLD: 45 %
NEUTS BAND NFR BLD MANUAL: 1 % (ref 0–7)
NITRITE UR QL STRIP.AUTO: NEGATIVE
OVALOCYTES BLD QL SMEAR: ABNORMAL
PATH INTERP BLD-IMP: YES
PERFORMED ON: ABNORMAL
PH UR STRIP.AUTO: 5 [PH] (ref 5–8)
PLATELET # BLD AUTO: 121 K/UL (ref 135–450)
PLATELET BLD QL SMEAR: ABNORMAL
PMV BLD AUTO: 8.2 FL (ref 5–10.5)
POIKILOCYTOSIS BLD QL SMEAR: ABNORMAL
POTASSIUM SERPL-SCNC: 5.1 MMOL/L (ref 3.5–5.1)
PROT UR STRIP.AUTO-MCNC: ABNORMAL MG/DL
RBC # BLD AUTO: 4.82 M/UL (ref 4.2–5.9)
RBC CLUMPS #/AREA URNS AUTO: 5 /HPF (ref 0–4)
SCHISTOCYTES BLD QL SMEAR: ABNORMAL
SLIDE REVIEW: ABNORMAL
SODIUM SERPL-SCNC: 137 MMOL/L (ref 136–145)
SP GR UR STRIP.AUTO: 1.01 (ref 1–1.03)
UA COMPLETE W REFLEX CULTURE PNL UR: ABNORMAL
UA DIPSTICK W REFLEX MICRO PNL UR: YES
URN SPEC COLLECT METH UR: ABNORMAL
UROBILINOGEN UR STRIP-ACNC: 0.2 E.U./DL
VARIANT LYMPHS NFR BLD MANUAL: 3 % (ref 0–6)
WBC # BLD AUTO: 5.6 K/UL (ref 4–11)
WBC #/AREA URNS AUTO: 4 /HPF (ref 0–5)

## 2024-02-01 PROCEDURE — 6370000000 HC RX 637 (ALT 250 FOR IP)

## 2024-02-01 PROCEDURE — 99285 EMERGENCY DEPT VISIT HI MDM: CPT

## 2024-02-01 PROCEDURE — 94640 AIRWAY INHALATION TREATMENT: CPT

## 2024-02-01 PROCEDURE — 81001 URINALYSIS AUTO W/SCOPE: CPT

## 2024-02-01 PROCEDURE — 6360000002 HC RX W HCPCS: Performed by: INTERNAL MEDICINE

## 2024-02-01 PROCEDURE — 83605 ASSAY OF LACTIC ACID: CPT

## 2024-02-01 PROCEDURE — 83036 HEMOGLOBIN GLYCOSYLATED A1C: CPT

## 2024-02-01 PROCEDURE — 1200000000 HC SEMI PRIVATE

## 2024-02-01 PROCEDURE — 51702 INSERT TEMP BLADDER CATH: CPT

## 2024-02-01 PROCEDURE — 74176 CT ABD & PELVIS W/O CONTRAST: CPT

## 2024-02-01 PROCEDURE — 80048 BASIC METABOLIC PNL TOTAL CA: CPT

## 2024-02-01 PROCEDURE — 6370000000 HC RX 637 (ALT 250 FOR IP): Performed by: INTERNAL MEDICINE

## 2024-02-01 PROCEDURE — 85025 COMPLETE CBC W/AUTO DIFF WBC: CPT

## 2024-02-01 PROCEDURE — 51798 US URINE CAPACITY MEASURE: CPT

## 2024-02-01 PROCEDURE — 2580000003 HC RX 258: Performed by: INTERNAL MEDICINE

## 2024-02-01 PROCEDURE — 2580000003 HC RX 258: Performed by: GENERAL ACUTE CARE HOSPITAL

## 2024-02-01 PROCEDURE — 36415 COLL VENOUS BLD VENIPUNCTURE: CPT

## 2024-02-01 RX ORDER — LIDOCAINE HYDROCHLORIDE 20 MG/ML
JELLY TOPICAL PRN
Status: DISCONTINUED | OUTPATIENT
Start: 2024-02-01 | End: 2024-02-05 | Stop reason: HOSPADM

## 2024-02-01 RX ORDER — ASPIRIN 81 MG/1
81 TABLET, CHEWABLE ORAL DAILY
Status: CANCELLED | OUTPATIENT
Start: 2024-02-01

## 2024-02-01 RX ORDER — 0.9 % SODIUM CHLORIDE 0.9 %
500 INTRAVENOUS SOLUTION INTRAVENOUS ONCE
Status: COMPLETED | OUTPATIENT
Start: 2024-02-01 | End: 2024-02-01

## 2024-02-01 RX ORDER — POLYETHYLENE GLYCOL 3350 17 G/17G
17 POWDER, FOR SOLUTION ORAL DAILY PRN
Status: DISCONTINUED | OUTPATIENT
Start: 2024-02-01 | End: 2024-02-05 | Stop reason: HOSPADM

## 2024-02-01 RX ORDER — ONDANSETRON 4 MG/1
4 TABLET, ORALLY DISINTEGRATING ORAL EVERY 8 HOURS PRN
Status: DISCONTINUED | OUTPATIENT
Start: 2024-02-01 | End: 2024-02-05 | Stop reason: HOSPADM

## 2024-02-01 RX ORDER — SODIUM CHLORIDE 9 MG/ML
INJECTION, SOLUTION INTRAVENOUS CONTINUOUS
Status: DISCONTINUED | OUTPATIENT
Start: 2024-02-01 | End: 2024-02-02

## 2024-02-01 RX ORDER — LIDOCAINE HYDROCHLORIDE 20 MG/ML
JELLY TOPICAL
Status: COMPLETED
Start: 2024-02-01 | End: 2024-02-01

## 2024-02-01 RX ORDER — ATORVASTATIN CALCIUM 40 MG/1
40 TABLET, FILM COATED ORAL DAILY
Status: DISCONTINUED | OUTPATIENT
Start: 2024-02-02 | End: 2024-02-01

## 2024-02-01 RX ORDER — TAMSULOSIN HYDROCHLORIDE 0.4 MG/1
0.4 CAPSULE ORAL NIGHTLY
Status: DISCONTINUED | OUTPATIENT
Start: 2024-02-01 | End: 2024-02-02

## 2024-02-01 RX ORDER — ONDANSETRON 2 MG/ML
4 INJECTION INTRAMUSCULAR; INTRAVENOUS EVERY 6 HOURS PRN
Status: DISCONTINUED | OUTPATIENT
Start: 2024-02-01 | End: 2024-02-05 | Stop reason: HOSPADM

## 2024-02-01 RX ORDER — ALBUTEROL SULFATE 90 UG/1
2 AEROSOL, METERED RESPIRATORY (INHALATION) EVERY 6 HOURS PRN
Status: DISCONTINUED | OUTPATIENT
Start: 2024-02-01 | End: 2024-02-05 | Stop reason: HOSPADM

## 2024-02-01 RX ORDER — FERROUS SULFATE 325(65) MG
325 TABLET ORAL
Status: CANCELLED | OUTPATIENT
Start: 2024-02-02

## 2024-02-01 RX ORDER — ACETAMINOPHEN 325 MG/1
650 TABLET ORAL EVERY 6 HOURS PRN
Status: DISCONTINUED | OUTPATIENT
Start: 2024-02-01 | End: 2024-02-05 | Stop reason: HOSPADM

## 2024-02-01 RX ORDER — GLUCAGON 1 MG/ML
1 KIT INJECTION PRN
Status: DISCONTINUED | OUTPATIENT
Start: 2024-02-01 | End: 2024-02-05 | Stop reason: HOSPADM

## 2024-02-01 RX ORDER — TAMSULOSIN HYDROCHLORIDE 0.4 MG/1
0.4 CAPSULE ORAL
COMMUNITY
Start: 2024-01-25

## 2024-02-01 RX ORDER — METOPROLOL SUCCINATE 25 MG/1
25 TABLET, EXTENDED RELEASE ORAL DAILY
Status: DISCONTINUED | OUTPATIENT
Start: 2024-02-01 | End: 2024-02-05

## 2024-02-01 RX ORDER — ACETAMINOPHEN 650 MG/1
650 SUPPOSITORY RECTAL EVERY 6 HOURS PRN
Status: DISCONTINUED | OUTPATIENT
Start: 2024-02-01 | End: 2024-02-05 | Stop reason: HOSPADM

## 2024-02-01 RX ORDER — INSULIN LISPRO 100 [IU]/ML
0-4 INJECTION, SOLUTION INTRAVENOUS; SUBCUTANEOUS NIGHTLY
Status: DISCONTINUED | OUTPATIENT
Start: 2024-02-01 | End: 2024-02-05 | Stop reason: HOSPADM

## 2024-02-01 RX ORDER — SODIUM CHLORIDE 9 MG/ML
INJECTION, SOLUTION INTRAVENOUS PRN
Status: DISCONTINUED | OUTPATIENT
Start: 2024-02-01 | End: 2024-02-05 | Stop reason: HOSPADM

## 2024-02-01 RX ORDER — HEPARIN SODIUM 5000 [USP'U]/ML
5000 INJECTION, SOLUTION INTRAVENOUS; SUBCUTANEOUS EVERY 8 HOURS SCHEDULED
Status: DISCONTINUED | OUTPATIENT
Start: 2024-02-01 | End: 2024-02-05 | Stop reason: HOSPADM

## 2024-02-01 RX ORDER — ATORVASTATIN CALCIUM 40 MG/1
40 TABLET, FILM COATED ORAL DAILY
Status: DISCONTINUED | OUTPATIENT
Start: 2024-02-01 | End: 2024-02-05 | Stop reason: HOSPADM

## 2024-02-01 RX ORDER — METOPROLOL SUCCINATE 25 MG/1
25 TABLET, EXTENDED RELEASE ORAL DAILY
Status: DISCONTINUED | OUTPATIENT
Start: 2024-02-02 | End: 2024-02-01

## 2024-02-01 RX ORDER — DEXTROSE MONOHYDRATE 100 MG/ML
INJECTION, SOLUTION INTRAVENOUS CONTINUOUS PRN
Status: DISCONTINUED | OUTPATIENT
Start: 2024-02-01 | End: 2024-02-05 | Stop reason: HOSPADM

## 2024-02-01 RX ORDER — INSULIN LISPRO 100 [IU]/ML
0-4 INJECTION, SOLUTION INTRAVENOUS; SUBCUTANEOUS
Status: DISCONTINUED | OUTPATIENT
Start: 2024-02-02 | End: 2024-02-05 | Stop reason: HOSPADM

## 2024-02-01 RX ORDER — SODIUM CHLORIDE 0.9 % (FLUSH) 0.9 %
5-40 SYRINGE (ML) INJECTION PRN
Status: DISCONTINUED | OUTPATIENT
Start: 2024-02-01 | End: 2024-02-05 | Stop reason: HOSPADM

## 2024-02-01 RX ORDER — SODIUM CHLORIDE 0.9 % (FLUSH) 0.9 %
5-40 SYRINGE (ML) INJECTION EVERY 12 HOURS SCHEDULED
Status: DISCONTINUED | OUTPATIENT
Start: 2024-02-01 | End: 2024-02-05 | Stop reason: HOSPADM

## 2024-02-01 RX ADMIN — ATORVASTATIN CALCIUM 40 MG: 40 TABLET, FILM COATED ORAL at 21:55

## 2024-02-01 RX ADMIN — SODIUM CHLORIDE, PRESERVATIVE FREE 10 ML: 5 INJECTION INTRAVENOUS at 21:18

## 2024-02-01 RX ADMIN — TAMSULOSIN HYDROCHLORIDE 0.4 MG: 0.4 CAPSULE ORAL at 21:17

## 2024-02-01 RX ADMIN — LIDOCAINE HYDROCHLORIDE: 20 JELLY TOPICAL at 14:33

## 2024-02-01 RX ADMIN — MOMETASONE FUROATE AND FORMOTEROL FUMARATE DIHYDRATE 2 PUFF: 200; 5 AEROSOL RESPIRATORY (INHALATION) at 20:42

## 2024-02-01 RX ADMIN — SODIUM CHLORIDE 500 ML: 9 INJECTION, SOLUTION INTRAVENOUS at 14:53

## 2024-02-01 RX ADMIN — SODIUM CHLORIDE: 9 INJECTION, SOLUTION INTRAVENOUS at 21:12

## 2024-02-01 RX ADMIN — HEPARIN SODIUM 5000 UNITS: 5000 INJECTION INTRAVENOUS; SUBCUTANEOUS at 21:17

## 2024-02-01 RX ADMIN — METOPROLOL SUCCINATE 25 MG: 25 TABLET, EXTENDED RELEASE ORAL at 21:55

## 2024-02-01 ASSESSMENT — PAIN SCALES - GENERAL: PAINLEVEL_OUTOF10: 0

## 2024-02-01 ASSESSMENT — LIFESTYLE VARIABLES
HOW OFTEN DO YOU HAVE A DRINK CONTAINING ALCOHOL: NEVER
HOW MANY STANDARD DRINKS CONTAINING ALCOHOL DO YOU HAVE ON A TYPICAL DAY: PATIENT DOES NOT DRINK

## 2024-02-01 ASSESSMENT — PAIN - FUNCTIONAL ASSESSMENT: PAIN_FUNCTIONAL_ASSESSMENT: 0-10

## 2024-02-01 NOTE — ED NOTES
Pharmacy Medication Reconciliation Note     List of medications patient is currently taking is complete.    Source of information:   Patient  EMR/records    Notes regarding home medications:   Was just at his nephrologist today and was told to discontinue farxiga and torsemide    Tad Ramires, Barbara  2/1/2024  5:55 PM

## 2024-02-01 NOTE — ED PROVIDER NOTES
BMP IN 4-5 DAYS.    TORSEMIDE (DEMADEX) 20 MG TABLET    Take 1 tablet by mouth daily    VITAMIN D3 (CHOLECALCIFEROL) 25 MCG (1000 UT) TABS TABLET    Take 1 tablet by mouth daily              (Please note that portions of this note were completed with a voice recognition program.  Efforts were made to edit the dictations but occasionally words are mis-transcribed.)    JACKI Tiwari CNP (electronically signed)        Teri Navarro APRN - CNP  02/01/24 1751       Teri Navarro APRN - CNP  02/01/24 2008

## 2024-02-01 NOTE — H&P
atelectasis in the left base.      Enlarged prostate gland.      Stable renal cysts.      Decompressed bladder by Bardales catheter.             Consults:    IP CONSULT TO NEPHROLOGY  IP CONSULT TO UROLOGY  IP CONSULT TO HOSPITALIST    ASSESSMENT:    Active Hospital Problems    Diagnosis Date Noted    ANITA (acute kidney injury) (HCC) [N17.9] 02/01/2024         PLAN:    Acute kidney injury, creatinine up to 5.1 baseline near 1.9 or 2.0, obstructive uropathy as etiology, Bardales and, IV fluid, expecting improvement nephrology urology consulted.  Discussed with ED provider agree with plan to admit for further management and treatment, discussed with patient and family at bedside all questions answered.  PTC CMP in a.m. ordered phosphorus level in a.m.  Obstructive uropathy, Bardales and, due to enlarged prostate and possibly prostate cancer, urology consulted  Diabetes mellitus sliding scale diabetic diet  Reported prostate cancer, urology consulted  Anemia, no signs of bleeding appears chronic   Thrombocytopenia mild repeat CBC in a.m.  Reported history of A-fib not on anticoagulation  DVT Prophylaxis: Heparin  Diet: No diet orders on file  Code Status: Prior    PT/OT Eval Status: Ordered    Dispo -inpatient 2 to 3 days       Jimmie Lovell MD    Thank you Denisha Infante for the opportunity to be involved in this patient's care. If you have any questions or concerns please feel free to contact me at [phone].    Comment: Please note this report has been produced using speech recognition software and may contain errors related to that system including errors in grammar, punctuation, and spelling, as well as words and phrases that may be inappropriate. If there are any questions or concerns, please feel free to contact the dictating provider for clarification.

## 2024-02-02 LAB
ALBUMIN SERPL-MCNC: 3.1 G/DL (ref 3.4–5)
ALBUMIN/GLOB SERPL: 1.1 {RATIO} (ref 1.1–2.2)
ALP SERPL-CCNC: 38 U/L (ref 40–129)
ALT SERPL-CCNC: <5 U/L (ref 10–40)
ANION GAP SERPL CALCULATED.3IONS-SCNC: 8 MMOL/L (ref 3–16)
ANISOCYTOSIS BLD QL SMEAR: ABNORMAL
AST SERPL-CCNC: 7 U/L (ref 15–37)
BASOPHILS # BLD: 0 K/UL (ref 0–0.2)
BASOPHILS NFR BLD: 0 %
BILIRUB SERPL-MCNC: 0.3 MG/DL (ref 0–1)
BUN SERPL-MCNC: 42 MG/DL (ref 7–20)
BURR CELLS BLD QL SMEAR: ABNORMAL
CALCIUM SERPL-MCNC: 7.9 MG/DL (ref 8.3–10.6)
CHLORIDE SERPL-SCNC: 111 MMOL/L (ref 99–110)
CO2 SERPL-SCNC: 19 MMOL/L (ref 21–32)
CREAT SERPL-MCNC: 3.6 MG/DL (ref 0.8–1.3)
DEPRECATED RDW RBC AUTO: 20.5 % (ref 12.4–15.4)
EOSINOPHIL # BLD: 0.1 K/UL (ref 0–0.6)
EOSINOPHIL NFR BLD: 2 %
EST. AVERAGE GLUCOSE BLD GHB EST-MCNC: 165.7 MG/DL
GFR SERPLBLD CREATININE-BSD FMLA CKD-EPI: 17 ML/MIN/{1.73_M2}
GLUCOSE BLD-MCNC: 168 MG/DL (ref 70–99)
GLUCOSE BLD-MCNC: 176 MG/DL (ref 70–99)
GLUCOSE BLD-MCNC: 195 MG/DL (ref 70–99)
GLUCOSE BLD-MCNC: 229 MG/DL (ref 70–99)
GLUCOSE SERPL-MCNC: 154 MG/DL (ref 70–99)
HAIRY CELLS [PRESENCE] IN BLOOD BY LIGHT MICROSCOPY: PRESENT
HBA1C MFR BLD: 7.4 %
HCT VFR BLD AUTO: 29.5 % (ref 40.5–52.5)
HGB BLD-MCNC: 9.5 G/DL (ref 13.5–17.5)
LYMPHOCYTES # BLD: 0.8 K/UL (ref 1–5.1)
LYMPHOCYTES NFR BLD: 22 %
MCH RBC QN AUTO: 22.1 PG (ref 26–34)
MCHC RBC AUTO-ENTMCNC: 32.2 G/DL (ref 31–36)
MCV RBC AUTO: 68.7 FL (ref 80–100)
MICROCYTES BLD QL SMEAR: ABNORMAL
MONOCYTES # BLD: 0.1 K/UL (ref 0–1.3)
MONOCYTES NFR BLD: 3 %
NEUTROPHILS # BLD: 2.6 K/UL (ref 1.7–7.7)
NEUTROPHILS NFR BLD: 73 %
OVALOCYTES BLD QL SMEAR: ABNORMAL
PATH INTERP BLD-IMP: NO
PATH INTERP BLD-IMP: NORMAL
PERFORMED ON: ABNORMAL
PHOSPHATE SERPL-MCNC: 3.2 MG/DL (ref 2.5–4.9)
PLATELET # BLD AUTO: 111 K/UL (ref 135–450)
PLATELET BLD QL SMEAR: ABNORMAL
PMV BLD AUTO: 8.1 FL (ref 5–10.5)
POIKILOCYTOSIS BLD QL SMEAR: ABNORMAL
POTASSIUM SERPL-SCNC: 5.8 MMOL/L (ref 3.5–5.1)
PROT SERPL-MCNC: 5.9 G/DL (ref 6.4–8.2)
RBC # BLD AUTO: 4.29 M/UL (ref 4.2–5.9)
SCHISTOCYTES BLD QL SMEAR: ABNORMAL
SLIDE REVIEW: ABNORMAL
SODIUM SERPL-SCNC: 138 MMOL/L (ref 136–145)
WBC # BLD AUTO: 3.5 K/UL (ref 4–11)

## 2024-02-02 PROCEDURE — 1200000000 HC SEMI PRIVATE

## 2024-02-02 PROCEDURE — 6370000000 HC RX 637 (ALT 250 FOR IP)

## 2024-02-02 PROCEDURE — 6370000000 HC RX 637 (ALT 250 FOR IP): Performed by: INTERNAL MEDICINE

## 2024-02-02 PROCEDURE — 36415 COLL VENOUS BLD VENIPUNCTURE: CPT

## 2024-02-02 PROCEDURE — 84100 ASSAY OF PHOSPHORUS: CPT

## 2024-02-02 PROCEDURE — 2580000003 HC RX 258: Performed by: INTERNAL MEDICINE

## 2024-02-02 PROCEDURE — 85025 COMPLETE CBC W/AUTO DIFF WBC: CPT

## 2024-02-02 PROCEDURE — 97165 OT EVAL LOW COMPLEX 30 MIN: CPT

## 2024-02-02 PROCEDURE — 97162 PT EVAL MOD COMPLEX 30 MIN: CPT

## 2024-02-02 PROCEDURE — 97535 SELF CARE MNGMENT TRAINING: CPT

## 2024-02-02 PROCEDURE — 94760 N-INVAS EAR/PLS OXIMETRY 1: CPT

## 2024-02-02 PROCEDURE — 6360000002 HC RX W HCPCS: Performed by: INTERNAL MEDICINE

## 2024-02-02 PROCEDURE — 2580000003 HC RX 258

## 2024-02-02 PROCEDURE — 80053 COMPREHEN METABOLIC PANEL: CPT

## 2024-02-02 PROCEDURE — 97530 THERAPEUTIC ACTIVITIES: CPT

## 2024-02-02 RX ORDER — TAMSULOSIN HYDROCHLORIDE 0.4 MG/1
0.8 CAPSULE ORAL NIGHTLY
Status: DISCONTINUED | OUTPATIENT
Start: 2024-02-02 | End: 2024-02-05 | Stop reason: HOSPADM

## 2024-02-02 RX ORDER — SODIUM CHLORIDE 450 MG/100ML
INJECTION, SOLUTION INTRAVENOUS CONTINUOUS
Status: DISCONTINUED | OUTPATIENT
Start: 2024-02-02 | End: 2024-02-03

## 2024-02-02 RX ADMIN — HEPARIN SODIUM 5000 UNITS: 5000 INJECTION INTRAVENOUS; SUBCUTANEOUS at 21:32

## 2024-02-02 RX ADMIN — ATORVASTATIN CALCIUM 40 MG: 40 TABLET, FILM COATED ORAL at 09:32

## 2024-02-02 RX ADMIN — TAMSULOSIN HYDROCHLORIDE 0.8 MG: 0.4 CAPSULE ORAL at 21:32

## 2024-02-02 RX ADMIN — METOPROLOL SUCCINATE 25 MG: 25 TABLET, EXTENDED RELEASE ORAL at 09:32

## 2024-02-02 RX ADMIN — HEPARIN SODIUM 5000 UNITS: 5000 INJECTION INTRAVENOUS; SUBCUTANEOUS at 05:28

## 2024-02-02 RX ADMIN — SODIUM ZIRCONIUM CYCLOSILICATE 10 G: 10 POWDER, FOR SUSPENSION ORAL at 11:25

## 2024-02-02 RX ADMIN — HEPARIN SODIUM 5000 UNITS: 5000 INJECTION INTRAVENOUS; SUBCUTANEOUS at 14:09

## 2024-02-02 RX ADMIN — Medication 10 ML: at 16:58

## 2024-02-02 RX ADMIN — SODIUM CHLORIDE, PRESERVATIVE FREE 10 ML: 5 INJECTION INTRAVENOUS at 21:32

## 2024-02-02 RX ADMIN — SODIUM CHLORIDE, PRESERVATIVE FREE 20 ML: 5 INJECTION INTRAVENOUS at 08:11

## 2024-02-02 RX ADMIN — SODIUM CHLORIDE: 4.5 INJECTION, SOLUTION INTRAVENOUS at 15:55

## 2024-02-02 NOTE — CONSULTS
Consulting Physician: Ramon JIMENEZ    Reason for Consult: Recently diagnosed with bladder cancer, acute urinary retention, ANITA in the setting of chronic kidney disease     History of Present Illness: Carter Altman is a 78 y.o. male PMH a-fib, CHF, HTN, DM. Recently diagnosed w/ intermediate risk prostate cancer (recommended surveillance for now). Had prostate biopsy 12/15/23 and was seen in the office by Dr. Cerda  for discussion regarding pathology. Prostate volume noted to be 143g on pMRI. At that time patient reported difficulty w/ voiding since biopsy. PVR was 100cc, he was restarted on flomax at that time.     Presented to ER yesterday w/ urinary retention. Had only been dribbling/voiding in small volumes. Over the past month. CTAP revealed decompressed bladder due to felipe catheter, enlarged prostate. No hydro or stones. Patient noted to have ANITA, Cr 5.1 on admission (baseline Cr 1.9-2). UA not c/w infection. Felipe catheter placed for >1L. Cr has improved today to 3.6.     Past Medical History:   Past Medical History:   Diagnosis Date    A-fib (HCC)     Anemia     Arthritis     Cancer (HCC)     hairy cell leukemia    CHF (congestive heart failure) (HCC)     Colon polyps 2010    Diabetes mellitus (HCC)     Hepatitis C     Hypertension     Obesity     Splenomegaly        Past Surgical History:  Past Surgical History:   Procedure Laterality Date    BONE MARROW BIOPSY      COLONOSCOPY      LIVER BIOPSY      TOTAL KNEE ARTHROPLASTY Left 2017       Social History:  Social History     Socioeconomic History    Marital status: Single     Spouse name: Not on file    Number of children: Not on file    Years of education: Not on file    Highest education level: Not on file   Occupational History    Not on file   Tobacco Use    Smoking status: Former     Current packs/day: 0.00     Types: Cigarettes     Quit date: 1981     Years since quittin.9    Smokeless tobacco: Never   Vaping Use

## 2024-02-02 NOTE — ED NOTES
Handoff report given to MONIKA Viramontes to assume care. No further questions at this time. This RN placed transport to room 4271. If any questions do arise, please don't hesitate to call [phone].

## 2024-02-02 NOTE — PLAN OF CARE
Problem: Discharge Planning  Goal: Discharge to home or other facility with appropriate resources  Outcome: Completed     Problem: Pain  Goal: Verbalizes/displays adequate comfort level or baseline comfort level  Outcome: Completed     Problem: Safety - Adult  Goal: Free from fall injury  Outcome: Completed     Problem: ABCDS Injury Assessment  Goal: Absence of physical injury  Outcome: Completed     Problem: Respiratory - Adult  Goal: Achieves optimal ventilation and oxygenation  Outcome: Completed     Problem: Genitourinary - Adult  Goal: Absence of urinary retention  Outcome: Completed     Problem: Infection - Adult  Goal: Absence of infection at discharge  Outcome: Completed     Problem: Metabolic/Fluid and Electrolytes - Adult  Goal: Electrolytes maintained within normal limits  Outcome: Completed  Goal: Hemodynamic stability and optimal renal function maintained  Outcome: Completed  Goal: Glucose maintained within prescribed range  Outcome: Completed     Problem: Nutrition Deficit:  Goal: Optimize nutritional status  Outcome: Completed

## 2024-02-02 NOTE — PLAN OF CARE
Problem: Discharge Planning  Goal: Discharge to home or other facility with appropriate resources  Outcome: Progressing     Problem: Pain  Goal: Verbalizes/displays adequate comfort level or baseline comfort level  Outcome: Progressing     Problem: Safety - Adult  Goal: Free from fall injury  Outcome: Progressing     Problem: ABCDS Injury Assessment  Goal: Absence of physical injury  Outcome: Progressing     Problem: Respiratory - Adult  Goal: Achieves optimal ventilation and oxygenation  Outcome: Progressing     Problem: Genitourinary - Adult  Goal: Absence of urinary retention  Outcome: Progressing  Goal: Urinary catheter remains patent  Outcome: Progressing     Problem: Metabolic/Fluid and Electrolytes - Adult  Goal: Electrolytes maintained within normal limits  Outcome: Progressing  Goal: Hemodynamic stability and optimal renal function maintained  Outcome: Progressing  Goal: Glucose maintained within prescribed range  Outcome: Progressing     Problem: Nutrition Deficit:  Goal: Optimize nutritional status  Outcome: Progressing

## 2024-02-02 NOTE — PLAN OF CARE
Problem: Discharge Planning  Goal: Discharge to home or other facility with appropriate resources  Outcome: Progressing  Flowsheets (Taken 2/1/2024 2145)  Discharge to home or other facility with appropriate resources:   Identify barriers to discharge with patient and caregiver   Identify discharge learning needs (meds, wound care, etc)   Refer to discharge planning if patient needs post-hospital services based on physician order or complex needs related to functional status, cognitive ability or social support system     Problem: Pain  Goal: Verbalizes/displays adequate comfort level or baseline comfort level  Outcome: Progressing     Problem: Safety - Adult  Goal: Free from fall injury  Outcome: Progressing     Problem: ABCDS Injury Assessment  Goal: Absence of physical injury  Outcome: Progressing

## 2024-02-03 LAB
ALBUMIN SERPL-MCNC: 3 G/DL (ref 3.4–5)
ANION GAP SERPL CALCULATED.3IONS-SCNC: 9 MMOL/L (ref 3–16)
BUN SERPL-MCNC: 36 MG/DL (ref 7–20)
CALCIUM SERPL-MCNC: 7.7 MG/DL (ref 8.3–10.6)
CHLORIDE SERPL-SCNC: 111 MMOL/L (ref 99–110)
CO2 SERPL-SCNC: 17 MMOL/L (ref 21–32)
CREAT SERPL-MCNC: 2.7 MG/DL (ref 0.8–1.3)
DEPRECATED RDW RBC AUTO: 20.6 % (ref 12.4–15.4)
FERRITIN SERPL IA-MCNC: 231.2 NG/ML (ref 30–400)
GFR SERPLBLD CREATININE-BSD FMLA CKD-EPI: 23 ML/MIN/{1.73_M2}
GLUCOSE BLD-MCNC: 146 MG/DL (ref 70–99)
GLUCOSE BLD-MCNC: 163 MG/DL (ref 70–99)
GLUCOSE BLD-MCNC: 168 MG/DL (ref 70–99)
GLUCOSE BLD-MCNC: 183 MG/DL (ref 70–99)
GLUCOSE BLD-MCNC: 188 MG/DL (ref 70–99)
GLUCOSE SERPL-MCNC: 148 MG/DL (ref 70–99)
HCT VFR BLD AUTO: 26.7 % (ref 40.5–52.5)
HGB BLD-MCNC: 8.4 G/DL (ref 13.5–17.5)
IRON SATN MFR SERPL: 19 % (ref 20–50)
IRON SERPL-MCNC: 28 UG/DL (ref 59–158)
MCH RBC QN AUTO: 21.8 PG (ref 26–34)
MCHC RBC AUTO-ENTMCNC: 31.7 G/DL (ref 31–36)
MCV RBC AUTO: 68.9 FL (ref 80–100)
PATH INTERP BLD-IMP: NO
PERFORMED ON: ABNORMAL
PHOSPHATE SERPL-MCNC: 2.7 MG/DL (ref 2.5–4.9)
PLATELET # BLD AUTO: 104 K/UL (ref 135–450)
PMV BLD AUTO: 8.1 FL (ref 5–10.5)
POTASSIUM SERPL-SCNC: 4.8 MMOL/L (ref 3.5–5.1)
RBC # BLD AUTO: 3.87 M/UL (ref 4.2–5.9)
SODIUM SERPL-SCNC: 137 MMOL/L (ref 136–145)
TIBC SERPL-MCNC: 151 UG/DL (ref 260–445)
WBC # BLD AUTO: 2.9 K/UL (ref 4–11)

## 2024-02-03 PROCEDURE — 6360000002 HC RX W HCPCS: Performed by: NURSE PRACTITIONER

## 2024-02-03 PROCEDURE — 6370000000 HC RX 637 (ALT 250 FOR IP)

## 2024-02-03 PROCEDURE — 83540 ASSAY OF IRON: CPT

## 2024-02-03 PROCEDURE — 36415 COLL VENOUS BLD VENIPUNCTURE: CPT

## 2024-02-03 PROCEDURE — 83550 IRON BINDING TEST: CPT

## 2024-02-03 PROCEDURE — 94760 N-INVAS EAR/PLS OXIMETRY 1: CPT

## 2024-02-03 PROCEDURE — 82728 ASSAY OF FERRITIN: CPT

## 2024-02-03 PROCEDURE — 85027 COMPLETE CBC AUTOMATED: CPT

## 2024-02-03 PROCEDURE — 6360000002 HC RX W HCPCS: Performed by: INTERNAL MEDICINE

## 2024-02-03 PROCEDURE — 6370000000 HC RX 637 (ALT 250 FOR IP): Performed by: INTERNAL MEDICINE

## 2024-02-03 PROCEDURE — 80069 RENAL FUNCTION PANEL: CPT

## 2024-02-03 PROCEDURE — 2580000003 HC RX 258

## 2024-02-03 PROCEDURE — 2580000003 HC RX 258: Performed by: INTERNAL MEDICINE

## 2024-02-03 PROCEDURE — 1200000000 HC SEMI PRIVATE

## 2024-02-03 RX ADMIN — IRON SUCROSE 100 MG: 20 INJECTION, SOLUTION INTRAVENOUS at 14:55

## 2024-02-03 RX ADMIN — HEPARIN SODIUM 5000 UNITS: 5000 INJECTION INTRAVENOUS; SUBCUTANEOUS at 14:55

## 2024-02-03 RX ADMIN — HEPARIN SODIUM 5000 UNITS: 5000 INJECTION INTRAVENOUS; SUBCUTANEOUS at 05:49

## 2024-02-03 RX ADMIN — SODIUM CHLORIDE: 4.5 INJECTION, SOLUTION INTRAVENOUS at 09:25

## 2024-02-03 RX ADMIN — SODIUM CHLORIDE, PRESERVATIVE FREE 10 ML: 5 INJECTION INTRAVENOUS at 20:35

## 2024-02-03 RX ADMIN — SODIUM CHLORIDE: 4.5 INJECTION, SOLUTION INTRAVENOUS at 17:57

## 2024-02-03 RX ADMIN — METOPROLOL SUCCINATE 25 MG: 25 TABLET, EXTENDED RELEASE ORAL at 09:26

## 2024-02-03 RX ADMIN — TAMSULOSIN HYDROCHLORIDE 0.8 MG: 0.4 CAPSULE ORAL at 20:36

## 2024-02-03 RX ADMIN — ATORVASTATIN CALCIUM 40 MG: 40 TABLET, FILM COATED ORAL at 09:25

## 2024-02-03 RX ADMIN — Medication 20 ML: at 14:57

## 2024-02-03 ASSESSMENT — PAIN SCALES - GENERAL: PAINLEVEL_OUTOF10: 0

## 2024-02-03 NOTE — PLAN OF CARE
Problem: Discharge Planning  Goal: Discharge to home or other facility with appropriate resources  2/2/2024 2238 by Wander Clinton RN  Outcome: Progressing  2/2/2024 1749 by Stephania Nieto RN  Outcome: Progressing     Problem: Pain  Goal: Verbalizes/displays adequate comfort level or baseline comfort level  2/2/2024 2238 by Wander Clinton RN  Outcome: Progressing  2/2/2024 1749 by Stephania Nieto RN  Outcome: Progressing     Problem: Safety - Adult  Goal: Free from fall injury  2/2/2024 2238 by Wander Clinton RN  Outcome: Progressing  2/2/2024 1749 by Stephania Nieto RN  Outcome: Progressing     Problem: ABCDS Injury Assessment  Goal: Absence of physical injury  2/2/2024 2238 by Wander Clinton RN  Outcome: Progressing  2/2/2024 1749 by Stephania Nieto RN  Outcome: Progressing     Problem: Respiratory - Adult  Goal: Achieves optimal ventilation and oxygenation  2/2/2024 2238 by Wander Clinton RN  Outcome: Progressing  2/2/2024 1749 by Stephania Nieto RN  Outcome: Progressing     Problem: Genitourinary - Adult  Goal: Absence of urinary retention  2/2/2024 2238 by Wander Clinton RN  Outcome: Progressing  2/2/2024 1749 by Stephania Nieto RN  Outcome: Progressing  Goal: Urinary catheter remains patent  2/2/2024 2238 by Wander Clinton RN  Outcome: Progressing  2/2/2024 1749 by Stephania Nieto RN  Outcome: Progressing     Problem: Metabolic/Fluid and Electrolytes - Adult  Goal: Electrolytes maintained within normal limits  2/2/2024 2238 by Wander Clinton RN  Outcome: Progressing  2/2/2024 1749 by Stephania Nieto RN  Outcome: Progressing  Goal: Hemodynamic stability and optimal renal function maintained  2/2/2024 2238 by Wander Clinton RN  Outcome: Progressing  2/2/2024 1749 by Stephania Nieto RN  Outcome: Progressing  Goal: Glucose maintained within prescribed range  2/2/2024 2238 by Wander Clinton RN  Outcome: Progressing  2/2/2024 1109 by

## 2024-02-04 LAB
ALBUMIN SERPL-MCNC: 2.9 G/DL (ref 3.4–5)
ANION GAP SERPL CALCULATED.3IONS-SCNC: 8 MMOL/L (ref 3–16)
BUN SERPL-MCNC: 31 MG/DL (ref 7–20)
CALCIUM SERPL-MCNC: 7.9 MG/DL (ref 8.3–10.6)
CHLORIDE SERPL-SCNC: 111 MMOL/L (ref 99–110)
CO2 SERPL-SCNC: 19 MMOL/L (ref 21–32)
CREAT SERPL-MCNC: 2.4 MG/DL (ref 0.8–1.3)
DEPRECATED RDW RBC AUTO: 20.9 % (ref 12.4–15.4)
GFR SERPLBLD CREATININE-BSD FMLA CKD-EPI: 27 ML/MIN/{1.73_M2}
GLUCOSE BLD-MCNC: 134 MG/DL (ref 70–99)
GLUCOSE BLD-MCNC: 165 MG/DL (ref 70–99)
GLUCOSE BLD-MCNC: 176 MG/DL (ref 70–99)
GLUCOSE BLD-MCNC: 251 MG/DL (ref 70–99)
GLUCOSE SERPL-MCNC: 138 MG/DL (ref 70–99)
HCT VFR BLD AUTO: 26.2 % (ref 40.5–52.5)
HGB BLD-MCNC: 8.4 G/DL (ref 13.5–17.5)
MCH RBC QN AUTO: 22.1 PG (ref 26–34)
MCHC RBC AUTO-ENTMCNC: 32.1 G/DL (ref 31–36)
MCV RBC AUTO: 68.8 FL (ref 80–100)
PATH INTERP BLD-IMP: NO
PERFORMED ON: ABNORMAL
PHOSPHATE SERPL-MCNC: 2.6 MG/DL (ref 2.5–4.9)
PLATELET # BLD AUTO: 94 K/UL (ref 135–450)
PLATELET BLD QL SMEAR: ABNORMAL
PMV BLD AUTO: 8.1 FL (ref 5–10.5)
POTASSIUM SERPL-SCNC: 4.5 MMOL/L (ref 3.5–5.1)
RBC # BLD AUTO: 3.8 M/UL (ref 4.2–5.9)
SLIDE REVIEW: ABNORMAL
SODIUM SERPL-SCNC: 138 MMOL/L (ref 136–145)
WBC # BLD AUTO: 3.1 K/UL (ref 4–11)

## 2024-02-04 PROCEDURE — 6360000002 HC RX W HCPCS: Performed by: INTERNAL MEDICINE

## 2024-02-04 PROCEDURE — 6370000000 HC RX 637 (ALT 250 FOR IP)

## 2024-02-04 PROCEDURE — 2580000003 HC RX 258: Performed by: INTERNAL MEDICINE

## 2024-02-04 PROCEDURE — 36415 COLL VENOUS BLD VENIPUNCTURE: CPT

## 2024-02-04 PROCEDURE — 80069 RENAL FUNCTION PANEL: CPT

## 2024-02-04 PROCEDURE — 85027 COMPLETE CBC AUTOMATED: CPT

## 2024-02-04 PROCEDURE — 51702 INSERT TEMP BLADDER CATH: CPT

## 2024-02-04 PROCEDURE — 1200000000 HC SEMI PRIVATE

## 2024-02-04 PROCEDURE — 6370000000 HC RX 637 (ALT 250 FOR IP): Performed by: INTERNAL MEDICINE

## 2024-02-04 PROCEDURE — 94760 N-INVAS EAR/PLS OXIMETRY 1: CPT

## 2024-02-04 PROCEDURE — 6360000002 HC RX W HCPCS: Performed by: NURSE PRACTITIONER

## 2024-02-04 RX ADMIN — HEPARIN SODIUM 5000 UNITS: 5000 INJECTION INTRAVENOUS; SUBCUTANEOUS at 21:43

## 2024-02-04 RX ADMIN — SODIUM CHLORIDE, PRESERVATIVE FREE 10 ML: 5 INJECTION INTRAVENOUS at 21:47

## 2024-02-04 RX ADMIN — HEPARIN SODIUM 5000 UNITS: 5000 INJECTION INTRAVENOUS; SUBCUTANEOUS at 06:39

## 2024-02-04 RX ADMIN — TAMSULOSIN HYDROCHLORIDE 0.8 MG: 0.4 CAPSULE ORAL at 21:43

## 2024-02-04 RX ADMIN — METOPROLOL SUCCINATE 25 MG: 25 TABLET, EXTENDED RELEASE ORAL at 08:15

## 2024-02-04 RX ADMIN — ATORVASTATIN CALCIUM 40 MG: 40 TABLET, FILM COATED ORAL at 08:15

## 2024-02-04 RX ADMIN — IRON SUCROSE 100 MG: 20 INJECTION, SOLUTION INTRAVENOUS at 15:10

## 2024-02-04 RX ADMIN — SODIUM CHLORIDE, PRESERVATIVE FREE 10 ML: 5 INJECTION INTRAVENOUS at 08:14

## 2024-02-04 RX ADMIN — HEPARIN SODIUM 5000 UNITS: 5000 INJECTION INTRAVENOUS; SUBCUTANEOUS at 15:10

## 2024-02-04 ASSESSMENT — PAIN SCALES - GENERAL: PAINLEVEL_OUTOF10: 0

## 2024-02-04 NOTE — PLAN OF CARE
Problem: Discharge Planning  Goal: Discharge to home or other facility with appropriate resources  Outcome: Completed     Problem: Pain  Goal: Verbalizes/displays adequate comfort level or baseline comfort level  Outcome: Completed     Problem: Safety - Adult  Goal: Free from fall injury  Outcome: Completed     Problem: ABCDS Injury Assessment  Goal: Absence of physical injury  Outcome: Completed     Problem: Respiratory - Adult  Goal: Achieves optimal ventilation and oxygenation  Outcome: Completed     Problem: Genitourinary - Adult  Goal: Absence of urinary retention  Outcome: Completed  Goal: Urinary catheter remains patent  Outcome: Completed     Problem: Metabolic/Fluid and Electrolytes - Adult  Goal: Electrolytes maintained within normal limits  Outcome: Completed  Goal: Hemodynamic stability and optimal renal function maintained  Outcome: Completed  Goal: Glucose maintained within prescribed range  Outcome: Completed     Problem: Nutrition Deficit:  Goal: Optimize nutritional status  Outcome: Completed

## 2024-02-04 NOTE — PLAN OF CARE
Problem: Discharge Planning  Goal: Discharge to home or other facility with appropriate resources  2/3/2024 2009 by Stephania Nieto, RN  Outcome: Completed     Problem: Pain  Goal: Verbalizes/displays adequate comfort level or baseline comfort level  2/3/2024 2009 by Stephania Nieto RN  Outcome: Completed     Problem: Safety - Adult  Goal: Free from fall injury  Recent Flowsheet Documentation  Taken 2/4/2024 0956 by Renee Echevarria RN  Free From Fall Injury: Instruct family/caregiver on patient safety     Problem: Safety - Adult  Goal: Free from fall injury  Recent Flowsheet Documentation  Taken 2/4/2024 0956 by Renee Echevarria RN  Free From Fall Injury: Instruct family/caregiver on patient safety  2/3/2024 2009 by Stephania Nieto, RN  Outcome: Completed     Problem: ABCDS Injury Assessment  Goal: Absence of physical injury  Recent Flowsheet Documentation  Taken 2/4/2024 0956 by Renee Echevarria RN  Absence of Physical Injury: Implement safety measures based on patient assessment

## 2024-02-05 VITALS
HEART RATE: 78 BPM | RESPIRATION RATE: 17 BRPM | TEMPERATURE: 98.5 F | SYSTOLIC BLOOD PRESSURE: 144 MMHG | DIASTOLIC BLOOD PRESSURE: 79 MMHG | HEIGHT: 69 IN | BODY MASS INDEX: 40.39 KG/M2 | WEIGHT: 272.71 LBS | OXYGEN SATURATION: 94 %

## 2024-02-05 LAB
ALBUMIN SERPL-MCNC: 2.9 G/DL (ref 3.4–5)
ANION GAP SERPL CALCULATED.3IONS-SCNC: 8 MMOL/L (ref 3–16)
BUN SERPL-MCNC: 27 MG/DL (ref 7–20)
CALCIUM SERPL-MCNC: 8 MG/DL (ref 8.3–10.6)
CHLORIDE SERPL-SCNC: 111 MMOL/L (ref 99–110)
CO2 SERPL-SCNC: 18 MMOL/L (ref 21–32)
CREAT SERPL-MCNC: 2.3 MG/DL (ref 0.8–1.3)
DEPRECATED RDW RBC AUTO: 20 % (ref 12.4–15.4)
GFR SERPLBLD CREATININE-BSD FMLA CKD-EPI: 28 ML/MIN/{1.73_M2}
GLUCOSE BLD-MCNC: 159 MG/DL (ref 70–99)
GLUCOSE BLD-MCNC: 186 MG/DL (ref 70–99)
GLUCOSE SERPL-MCNC: 175 MG/DL (ref 70–99)
HCT VFR BLD AUTO: 27.1 % (ref 40.5–52.5)
HGB BLD-MCNC: 8.6 G/DL (ref 13.5–17.5)
MCH RBC QN AUTO: 21.7 PG (ref 26–34)
MCHC RBC AUTO-ENTMCNC: 31.6 G/DL (ref 31–36)
MCV RBC AUTO: 68.7 FL (ref 80–100)
PATH INTERP BLD-IMP: NO
PERFORMED ON: ABNORMAL
PERFORMED ON: ABNORMAL
PHOSPHATE SERPL-MCNC: 2.9 MG/DL (ref 2.5–4.9)
PLATELET # BLD AUTO: 98 K/UL (ref 135–450)
PMV BLD AUTO: 8.1 FL (ref 5–10.5)
POTASSIUM SERPL-SCNC: 4.5 MMOL/L (ref 3.5–5.1)
RBC # BLD AUTO: 3.95 M/UL (ref 4.2–5.9)
SODIUM SERPL-SCNC: 137 MMOL/L (ref 136–145)
WBC # BLD AUTO: 3.1 K/UL (ref 4–11)

## 2024-02-05 PROCEDURE — 36415 COLL VENOUS BLD VENIPUNCTURE: CPT

## 2024-02-05 PROCEDURE — 85027 COMPLETE CBC AUTOMATED: CPT

## 2024-02-05 PROCEDURE — 80069 RENAL FUNCTION PANEL: CPT

## 2024-02-05 PROCEDURE — 2580000003 HC RX 258: Performed by: INTERNAL MEDICINE

## 2024-02-05 PROCEDURE — 6370000000 HC RX 637 (ALT 250 FOR IP): Performed by: INTERNAL MEDICINE

## 2024-02-05 PROCEDURE — 6360000002 HC RX W HCPCS: Performed by: NURSE PRACTITIONER

## 2024-02-05 PROCEDURE — 94760 N-INVAS EAR/PLS OXIMETRY 1: CPT

## 2024-02-05 PROCEDURE — 6360000002 HC RX W HCPCS: Performed by: INTERNAL MEDICINE

## 2024-02-05 PROCEDURE — 6370000000 HC RX 637 (ALT 250 FOR IP): Performed by: HOSPITALIST

## 2024-02-05 RX ORDER — METOPROLOL SUCCINATE 50 MG/1
50 TABLET, EXTENDED RELEASE ORAL DAILY
Status: DISCONTINUED | OUTPATIENT
Start: 2024-02-05 | End: 2024-02-05 | Stop reason: HOSPADM

## 2024-02-05 RX ADMIN — HEPARIN SODIUM 5000 UNITS: 5000 INJECTION INTRAVENOUS; SUBCUTANEOUS at 06:49

## 2024-02-05 RX ADMIN — METOPROLOL SUCCINATE 50 MG: 50 TABLET, EXTENDED RELEASE ORAL at 13:02

## 2024-02-05 RX ADMIN — ATORVASTATIN CALCIUM 40 MG: 40 TABLET, FILM COATED ORAL at 08:52

## 2024-02-05 RX ADMIN — IRON SUCROSE 100 MG: 20 INJECTION, SOLUTION INTRAVENOUS at 13:02

## 2024-02-05 RX ADMIN — METOPROLOL SUCCINATE 25 MG: 25 TABLET, EXTENDED RELEASE ORAL at 08:52

## 2024-02-05 RX ADMIN — SODIUM CHLORIDE, PRESERVATIVE FREE 10 ML: 5 INJECTION INTRAVENOUS at 08:52

## 2024-02-05 RX ADMIN — HEPARIN SODIUM 5000 UNITS: 5000 INJECTION INTRAVENOUS; SUBCUTANEOUS at 13:02

## 2024-02-05 NOTE — PLAN OF CARE
Problem: Skin/Tissue Integrity  Goal: Absence of new skin breakdown  Description: 1.  Monitor for areas of redness and/or skin breakdown  2.  Assess vascular access sites hourly  3.  Every 4-6 hours minimum:  Change oxygen saturation probe site  4.  Every 4-6 hours:  If on nasal continuous positive airway pressure, respiratory therapy assess nares and determine need for appliance change or resting period.  2/5/2024 1442 by Chanelle Tellez RN  Outcome: Completed  2/5/2024 1017 by Chanelle Tellez RN  Outcome: Progressing  2/5/2024 0257 by Clarissa Cassidy RN  Outcome: Progressing  2/5/2024 0256 by Clarissa Cassidy RN  Outcome: Progressing     Problem: ABCDS Injury Assessment  Goal: Absence of physical injury  2/5/2024 1442 by Chanelle Tellez RN  Outcome: Completed  2/5/2024 1017 by Chanelle Tellez RN  Outcome: Progressing  2/5/2024 0257 by Clarissa Cassidy RN  Outcome: Progressing  2/5/2024 0256 by Clarissa Cassidy RN  Outcome: Progressing     Problem: Metabolic/Fluid and Electrolytes - Adult  Goal: Electrolytes maintained within normal limits  2/5/2024 1442 by Chanelle Tellez RN  Outcome: Completed  2/5/2024 1017 by Chanelle Tellez RN  Outcome: Progressing  2/5/2024 0257 by Clarissa Cassidy RN  Outcome: Progressing  Goal: Hemodynamic stability and optimal renal function maintained  2/5/2024 1442 by Chanelle Tellez RN  Outcome: Completed  2/5/2024 1017 by Chanelle Tellez RN  Outcome: Progressing  2/5/2024 0257 by Clarissa Cassidy RN  Outcome: Progressing  Goal: Glucose maintained within prescribed range  2/5/2024 1442 by Chanelle Tellez RN  Outcome: Completed  2/5/2024 1017 by Chanelle Tellez RN  Outcome: Progressing  2/5/2024 0257 by Clarissa Cassidy RN  Outcome: Progressing     Problem: Genitourinary - Adult  Goal: Absence of urinary retention  2/5/2024 1442 by Chanelle Tellez RN  Outcome: Completed  2/5/2024 1017 by Hodapp, Chanelle, RN  Outcome: Progressing  2/5/2024 0257 by Clarissa Cassidy, RN  Outcome: Progressing  Goal: Urinary

## 2024-02-05 NOTE — CONSULTS
Urology re-consulted regarding patient's recent diagnosis of prostate cancer. Patient underwent prostate biopsy w/ Dr. Cerda 1/22/24.     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 This was discussed with patient on 1/25/24 w/ Dr. Cerda in the office. It was decided that plan with likely be for surveillance given age and comorbidities, and less aggressive disease.    He has appointment to follow up in the office on 2/7/24 which will likely need to be rescheduled due to inpatient status. Our office can reach out to him to schedule.    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 143g prostate  ANITA improving since felipe catheter placement. 5.1 on admission, to 2.3 today  Felipe placed for >1L UOP  Flomax was doubled  Recommend keeping felipe catheter for 1 week (2/9/24) given extent of his ANITA from retention. He can follow up in office for evaluation of voiding trial and continued discussion regarding prostate cancer.   He may need to have GE procedure on prostate if he continues to have retention.     Ally Tapia, APRN - CNP

## 2024-02-05 NOTE — PROGRESS NOTES
Case Management Discharge Note          Date / Time of Note: 2/5/2024 4:11 PM                  Patient Name: Carter Altman   YOB: 1945  Diagnosis: Prostate cancer (HCC) [C61]  ANITA (acute kidney injury) (HCC) [N17.9]  Acute urinary retention [R33.8]  Acute kidney injury superimposed on CKD (HCC) [N17.9, N18.9]   Date / Time: 2/1/2024  1:25 PM    Financial:  Payor: [facility] MEDICARE / Plan: YURI MEDIBLUE ESSENTIAL/PLUS / Product Type: *No Product type* /      Pharmacy:    [facility] [id number] [facility] [address] [facility] [phone] - F [phone]  [address] [place] [address]  Phone: [phone] Fax: [phone]    [facility] [address] [phone] - F [phone]  [address] [place] [address]  Phone: [phone] Fax: [phone]      Assistance purchasing medications?: Potential Assistance Purchasing Medications: No  Assistance provided by Case Management: None at this time    DISCHARGE Disposition: Home- No Services Needed      Transportation:  Transportation PLAN for discharge: family   Mode of Transport: Private Car    Time of Transport: 1630    IMM Completed:   Yes, Case management has presented and reviewed IMM letter #2.   IMM Letter given to Patient/Family/Significant other/Guardian/POA/by:: Provided to patient by JORDY Cano. Education provided to patient, patient reported no questions and verbalized understanding. Patient aware of 4 hours allotted time to determine if they choose to pursue Medicare appeal process.   IMM Letter date given:: 02/05/24  IMM Letter time given:: 6248.   Patient and/or family/POA verbalized understanding of their medicare rights and appeal process if needed. Patient and/or family/POA has signed, initialed and placed the date and time on IMM letter #2 on the the appropriate lines. Copy of letter offered and they are aware that the original copy of 
    V2.0    [facility] Progress Note      Name:  Carter Altman /Age/Sex: 1945  (78 y.o. male)   MRN & CSN:  [id number] & [id number] Encounter Date/Time: 2/3/2024 10:24 AM EST   Location:  [id number] PCP: Denisha Infante     Attending:No att. providers found       Hospital Day: 3    Assessment and Recommendations   Carter Altman is a 78 y.o. male with pmh of diabetes HTN  who presents with ANITA (acute kidney injury) ([facility])      Today have much improved creatinine down from 3.6-2.7, diuresing well, pancytopenia noted, iron studies with low iron at 28 TIBC 151     Hospital Problems             Last Modified POA    * (Principal) ANITA (acute kidney injury) ([facility]) 2024 Yes    Hypertension 2024 Yes    A-fib ([facility]) 2024 Yes    CHF (congestive heart failure) ([facility]) 2024 Yes    Diabetes mellitus ([facility]) 2024 Yes    Obesity 2024 Yes     Plan:   IV hydration , diabetes HTN management   Monitor labs  Add IV Venofer  Nephrology consult comanagement   PT OT   Diet ADULT DIET; Regular; 4 carb choices (60 gm/meal); Low Potassium (Less than 3000 mg/day)  ADULT ORAL NUTRITION SUPPLEMENT; Breakfast, Dinner; Renal Oral Supplement   DVT Prophylaxis [] Lovenox, []  Heparin, [] SCDs, [] Ambulation,  [] Eliquis, [] Xarelto  [] Coumadin   Code Status Full Code   Disposition From: home   Expected Disposition: home   Estimated Date of Discharge: 2024  Patient requires continued admission due to renal failure    Surrogate Decision Maker/ POA  self     Personally reviewed Lab Studies and Imaging     Discussed management of the case with NA who recommended NA    EKG interpreted personally and results     Imaging that was interpreted personally includes NA and results reviewed in epic     Drugs that require monitoring for toxicity include NA and the method of monitoring was NA        Subjective:     Chief Complaint: retention of urine; improved significantly after Bardales inserted; 1.5 L charted overnight    Carter PAYAN 
    V2.0    [facility] Progress Note      Name:  Carter Altman /Age/Sex: 1945  (78 y.o. male)   MRN & CSN:  [id number] & [id number] Encounter Date/Time: 2024 10:24 AM EST   Location:  [id number] PCP: Denisha Infante     Attending:Steve Baca MD       Hospital Day: 2    Assessment and Recommendations   Carter Altman is a 78 y.o. male with pmh of diabetes HTN  who presents with ANITA (acute kidney injury) ([facility])    Hospital Problems             Last Modified POA    * (Principal) ANITA (acute kidney injury) ([facility]) 2024 Yes    Hypertension 2024 Yes    A-fib ([facility]) 2024 Yes    CHF (congestive heart failure) ([facility]) 2024 Yes    Diabetes mellitus ([facility]) 2024 Yes    Obesity 2024 Yes       Plan:   IV hydration , diabetes HTN management   Nephrology consult comanagement   PT OT   Diet ADULT DIET; Regular; 4 carb choices (60 gm/meal)  ADULT ORAL NUTRITION SUPPLEMENT; Breakfast, Dinner; Low Calorie/High Protein Oral Supplement   DVT Prophylaxis [] Lovenox, []  Heparin, [] SCDs, [] Ambulation,  [] Eliquis, [] Xarelto  [] Coumadin   Code Status Full Code   Disposition From: home   Expected Disposition: home   Estimated Date of Discharge: 2024  Patient requires continued admission due to renal failure    Surrogate Decision Maker/ POA  self     Personally reviewed Lab Studies and Imaging     Discussed management of the case with NA who recommended NA    EKG interpreted personally and results     Imaging that was interpreted personally includes NA and results reviewed in epic     Drugs that require monitoring for toxicity include NA and the method of monitoring was NA        Subjective:     Chief Complaint: retention of urine     Carter Altman is a 78 y.o. male who presents with unable to void       Review of Systems:      Pertinent positives and negatives discussed in HPI    Objective:     Intake/Output Summary (Last 24 hours) at 2024 1024  Last data filed at 2024 0938  Gross per 24 
  Nephrology Consult Note   [email or web address]      Reason for consultation: ANITA on CKD 4 -- Cr baseline ~ 2.0 mg/dL. Follows with Dr. St in office.     History of Present Illness: Carter Altman is a 77 yo male with a PMHx of CKD 4, proteinuria, h/o hyperkalemia, HFrEF, DM2, HTN, BPH, afib, hairy cell leukemia, DM, h/o hep C, splenomegaly. Patient presented to  ED with complaints of urinary retention and abnormal lab values. He had a scheduled appt with Dr. St yesterday on 2/1/2024 where Cr was noted to be close to 7.0 mg/dL from last known Cr of 2.44 mg/dL on 10/24/2023. Dr. St stopped PTA lisinopril, torsemide, and farxiga and directed patient to ED. Cr was 5.1 mg/dL upon ED lab draw. PTA, patient complained of urinary hesitancy, incontinence, and lower abdominal pressure that has been ongoing since his prostate biopsy a couple weeks ago. States he had an appt with urology a couple days ago (PVR WNL) and was placed on flomax. Endorses poor appetite PTA but still drinking fluids. A felipe catheter was placed upon arrival to ED with 1.3 L of UOP. He was given a 500 mL NS bolus and started on NS @ 125 mL/hr. Today, Cr is down to 3.6 mg/dL.     We have been consulted for ANITA on CKD management.     Subjective:      Patient seen and examined. Labs and chart reviewed.     HPI:  Breathing comfortably.  No CP.  Renal function better.  ROS:  In bed.  No fever.  PMFSH:  medications reviewed.    Physical Exam:    /64   Pulse 77   Temp 98.1 °F (36.7 °C) (Oral)   Resp 18   Ht 1.753 m (5' 9.02\")   Wt 123.4 kg (272 lb 0.8 oz)   SpO2 97%   BMI 40.16 kg/m²       24HR INTAKE/OUTPUT:    Intake/Output Summary (Last 24 hours) at 2/4/2024 1659  Last data filed at 2/4/2024 1250  Gross per 24 hour   Intake 210 ml   Output 2000 ml   Net -1790 ml         Patient Vitals for the past 96 hrs (Last 3 readings):   Weight   02/04/24 0457 123.4 kg (272 lb 0.8 oz)   02/03/24 0613 122.6 kg (270 lb 4.5 oz)   02/02/24 0628 121.8 
  Nephrology Progress Note   [facility]      Reason for consultation: ANITA on CKD 4 -- Cr baseline ~ 2.0 mg/dL. Follows with Dr. St in office.     History of Present Illness: Carter Altman is a 79 yo male with a PMHx of CKD 4, proteinuria, h/o hyperkalemia, HFrEF, DM2, HTN, BPH, afib, hairy cell leukemia, DM, h/o hep C, splenomegaly. Patient presented to  ED with complaints of urinary retention and abnormal lab values. He had a scheduled appt with Dr. St yesterday on 2/1/2024 where Cr was noted to be close to 7.0 mg/dL from last known Cr of 2.44 mg/dL on 10/24/2023. Dr. St stopped PTA lisinopril, torsemide, and farxiga and directed patient to ED. Cr was 5.1 mg/dL upon ED lab draw. PTA, patient complained of urinary hesitancy, incontinence, and lower abdominal pressure that has been ongoing since his prostate biopsy a couple weeks ago. States he had an appt with urology a couple days ago (PVR WNL) and was placed on flomax. Endorses poor appetite PTA but still drinking fluids. A felipe catheter was placed upon arrival to ED with 1.3 L of UOP. He was given a 500 mL NS bolus and started on NS @ 125 mL/hr. Today, Cr is down to 3.6 mg/dL.     We have been consulted for ANITA on CKD management.     Subjective:      Patient seen and examined. Labs and chart reviewed. Resting comfortably in bed. Felipe catheter in place and draining well. Cr is trending down to 2.3 mg/dL today.     Patient Review of Systems: Denies SOB, N/V/D. Feeling better.    Physical Exam:    BP (!) 144/79   Pulse 78   Temp 98.5 °F (36.9 °C) (Oral)   Resp 17   Ht 1.753 m (5' 9.02\")   Wt 123.7 kg (272 lb 11.3 oz)   SpO2 94%   BMI 40.25 kg/m²       24HR INTAKE/OUTPUT:    Intake/Output Summary (Last 24 hours) at 2/5/2024 1251  Last data filed at 2/5/2024 0900  Gross per 24 hour   Intake 1020 ml   Output 1650 ml   Net -630 ml         Patient Vitals for the past 96 hrs (Last 3 readings):   Weight   02/05/24 0507 123.7 kg (272 lb 11.3 oz) 
 Pt AOX4 - pt denied pain, n/v, sob, diarrhea - Pt denied any further needs at this time - bed in lowest and locked position with call light and bed side table within reach - non skid socks and bed/chair exit alarm on    
 Pt AOX4 - pt denied pain, n/v, sob, diarrhea - Pt denied any further needs at this time - bed in lowest and locked position with call light and bed side table within reach - non skid socks bed/chair exit alarm on   
4 Eyes Skin Assessment     NAME:  Carter Altman  YOB: 1945  MEDICAL RECORD NUMBER:  [id number]    The patient is being assessed for  Admission    I agree that at least one RN has performed a thorough Head to Toe Skin Assessment on the patient. ALL assessment sites listed below have been assessed.      Areas assessed by both nurses:    Head, Face, Ears, Shoulders, Back, Chest, Arms, Elbows, Hands, Sacrum. Buttock, Coccyx, Ischium, Legs. Feet and Heels, and Under Medical Devices         Does the Patient have a Wound? No noted wound(s)       Barry Prevention initiated by RN: No  Wound Care Orders initiated by RN: No    Pressure Injury (Stage 3,4, Unstageable, DTI, NWPT, and Complex wounds) if present, place Wound referral order by RN under : No    New Ostomies, if present place, Ostomy referral order under : No     Nurse 1 eSignature: Electronically signed by Wander Clinton RN on 2/1/24 at 9:53 PM EST    **SHARE this note so that the co-signing nurse can place an eSignature**    Nurse 2 eSignature: Electronically signed by Mayra Lara RN on 2/1/24 at 10:03 PM EST   
Comprehensive Nutrition Assessment    Type and Reason for Visit:  Initial, Positive Nutrition Screen (weight loss, decreased appetite)    Nutrition Recommendations/Plan:   Carb control, low potassium diet  Nepro berry bid, monitor tolerance  Will monitor nutritional adequacy, nutrition-related labs, weights, BMs, and clinical progress       Malnutrition Assessment:  Malnutrition Status:  At risk for malnutrition (Comment) (02/02/24 6509)    Context:  Acute Illness     Findings of the 6 clinical characteristics of malnutrition:  Energy Intake:  75% or less of estimated energy requirements for 7 or more days  Weight Loss:  No significant weight loss     Body Fat Loss:  No significant body fat loss     Muscle Mass Loss:  No significant muscle mass loss    Fluid Accumulation:  Unable to assess     Strength:  Not Performed    Nutrition Assessment:    Nutrition risk triggered for weight loss and decreased appetite.  Difficult to assess weight loss due to fluctuations Patient reported appetite decreased for the past week after having prostate biopsy.  K elevated this am, MD had modified diet this am.  PO intake greater than 75% meals.  RD will change supplement to Nepro to lower potassium content.  Since appetite improving and weight loss does not appear significant, patient likely will not need nutrition supplements after discharge.  RD provided list of potassium content of food at time of visit.  Patient appreciated the handout as a review, received information at MD office but it has been awhile.  No questions at this time.    Nutrition Related Findings:    K 5.8, BUN 42, Creat 3.6 on 2/2; no BM note yet this admission Wound Type: None       Current Nutrition Intake & Therapies:    Average Meal Intake: %  Average Supplements Intake: 51-75%, %  ADULT DIET; Regular; 4 carb choices (60 gm/meal); Low Potassium (Less than 3000 mg/day)  ADULT ORAL NUTRITION SUPPLEMENT; Breakfast, Dinner; Renal Oral 
Occupational Therapy  Facility/Department: [address] MED SURG  Occupational Therapy Initial Assessment    Name: Carter Altman  : 1945  MRN: [id number]  Date of Service: 2024    Discharge Recommendations:  Home with assist PRN     Carter Altman scored a 21/24 on the AM-PAC ADL Inpatient form.  At this time, no further OT is recommended upon discharge due to pt nearing baseline function.  Recommend patient returns to prior setting with prior services.         Patient Diagnosis(es): The primary encounter diagnosis was Acute urinary retention. Diagnoses of Acute kidney injury superimposed on CKD (HCC) and Prostate cancer (HCC) were also pertinent to this visit.  Past Medical History:  has a past medical history of A-fib (HCC), Anemia, Arthritis, Cancer (HCC), CHF (congestive heart failure) (HCC), Colon polyps, Diabetes mellitus (HCC), Hepatitis C, Hypertension, Obesity, and Splenomegaly.  Past Surgical History:  has a past surgical history that includes bone marrow biopsy; Colonoscopy; liver biopsy; and Total knee arthroplasty (Left, 2017).    Treatment Diagnosis: Decreased: ADLs, functional transfers/mobility      Assessment   Performance deficits / Impairments: Decreased functional mobility ;Decreased ADL status;Decreased high-level IADLs  Assessment: Pt is a 77 yo M who presented with urinary retention after a recent prostate biopsy. Prior to admit, pt lives alone in an apt where he is typically independent w/ use of SPC. He is functioning just below baseline at this time, completing functional transfers and mobility SBA using SPC, toileting and standing grooming SBA. Will continue to follow while hospitalized. Anticipate pt will be safe to return home w/ assist PRN when medically stable.  Treatment Diagnosis: Decreased: ADLs, functional transfers/mobility  Prognosis: Good  Decision Making: Low Complexity  REQUIRES OT FOLLOW-UP: Yes  Activity Tolerance  Activity Tolerance: Patient Tolerated 
PATIENT tolerated care well. Patient had his eyes closed for most of the night. He denies any pain.  
Patient admitted to room 4271, AO X4. Patient denies pain, oriented to room, call light placed within reach.   
Patient, states I feel better now than when I was transferred to the room from ED earlier, and I slept well'. Patient denies any pain. Patient voided about 1650 ml.  
Physical Therapy  Facility/Department: [address] MED SURG  Physical Therapy Initial Assessment     Name: Carter Altman  : 1945  MRN: [id number]  Date of Service: 2024    Discharge Recommendations:  Home independently          Patient Diagnosis(es): The primary encounter diagnosis was Acute urinary retention. Diagnoses of Acute kidney injury superimposed on CKD (HCC) and Prostate cancer (HCC) were also pertinent to this visit.  Past Medical History:  has a past medical history of A-fib (HCC), Anemia, Arthritis, Cancer (HCC), CHF (congestive heart failure) (HCC), Colon polyps, Diabetes mellitus (HCC), Hepatitis C, Hypertension, Obesity, and Splenomegaly.  Past Surgical History:  has a past surgical history that includes bone marrow biopsy; Colonoscopy; liver biopsy; and Total knee arthroplasty (Left, 2017).    Assessment   Body Structures, Functions, Activity Limitations Requiring Skilled Therapeutic Intervention: Decreased functional mobility ;Decreased ADL status;Decreased strength;Decreased safe awareness;Decreased endurance;Decreased balance  Assessment: Patient is a 78 y.o. male who states that since having a prostate biopsy a couple weeks ago per Dr. Cerda he has had urgency, incontinence, dribbling, and hematuria. Prior to admission, pt living alone in apt setting, independent with ADLs and ambulation with SPC. Pt currently functioning near baseline. Will continue to follow [facility] setting for maintenance purposes, but anticipate will be able to return home independently.  Treatment Diagnosis: impaired mobility  Therapy Prognosis: Good  Decision Making: Medium Complexity  History: see above  Exam: see below  Clinical Presentation: evolving  Requires PT Follow-Up: Yes  Activity Tolerance  Activity Tolerance: Patient tolerated treatment well     Plan   Physical Therapy Plan  General Plan: 2-3 times per week  Current Treatment Recommendations: Strengthening, Balance training, Functional mobility 
Pt alert and oriented x4. Pt denies any pain, nausea, vomiting, and SOB. Pt sitting up in the chair. Chair alarm active. Call light and item need in reach. Electronically signed by Renee Echevarria RN on 2/4/2024 at 6:13 PM    
Pt does not want ; claims to only use when needed. Electronically signed by Stefan Grady RCP on 2/3/2024 at 8:10 AM    
Pt educated on discharge instructions and follow-up appointments. Bardales in place, changed to leg bag. Pt states no further questions at this time. Pt IV removed with no complications. Pt transported to home by family.    Electronically signed by Chanelle Tellez RN on 2/5/2024 at 4:35 PM     
Pt's  twice this morning, 14 beats of PATs. HR 85 currently, and pt states he feels okay. Dr. Sanches notified via secure message. No new orders at this time.     Electronically signed by Chanelle Tellez RN on 2/5/2024 at 10:11 AM   
  02/01/24 1325 124 kg (273 lb 5.9 oz)         General: Alert, Awake, NAD, Obese  HEENT: Normocephalic, atraumatic  Neck: No Thyromegaly, Trachea is midline  Eyes: EOMI, TIP  Chest: clear to auscultation, no intercostal retractions  CVS: RRR, no murmur, no rub  Abdomen: soft, non tender  Extremities: no edema, no cyanosis.  Skin: normal texture, normal skin turgor, no rash  Neurological: CN intact, no focal motor neurological deficit  Psych: normal affect; AAO x 3  : felipe in place      LAB DATA:    CBC:   Lab Results   Component Value Date/Time    WBC 2.9 02/03/2024 06:11 AM    RBC 3.87 02/03/2024 06:11 AM    RBC 4.93 06/21/2017 12:53 PM    HGB 8.4 02/03/2024 06:11 AM    HCT 26.7 02/03/2024 06:11 AM    MCV 68.9 02/03/2024 06:11 AM    MCH 21.8 02/03/2024 06:11 AM    MCHC 31.7 02/03/2024 06:11 AM    RDW 20.6 02/03/2024 06:11 AM     02/03/2024 06:11 AM    MPV 8.1 02/03/2024 06:11 AM     BMP:    Lab Results   Component Value Date/Time     02/03/2024 06:11 AM    K 4.8 02/03/2024 06:11 AM    K 5.8 02/02/2024 06:00 AM     02/03/2024 06:11 AM    CO2 17 02/03/2024 06:11 AM    BUN 36 02/03/2024 06:11 AM    CREATININE 2.7 02/03/2024 06:11 AM    CALCIUM 7.7 02/03/2024 06:11 AM    GFRAA 55 06/14/2021 12:42 PM    GFRAA >60 06/14/2010 01:13 PM    LABGLOM 23 02/03/2024 06:11 AM    GLUCOSE 148 02/03/2024 06:11 AM    GLUCOSE 370 02/22/2017 01:59 PM     Ionized Calcium:  No results found for: \"IONCA\"  Magnesium:    Lab Results   Component Value Date/Time    MG 1.90 02/13/2023 10:32 AM     Phosphorus:    Lab Results   Component Value Date/Time    PHOS 2.7 02/03/2024 06:11 AM     U/A:    Lab Results   Component Value Date/Time    COLORU Yellow 02/01/2024 02:43 PM    PHUR 5.0 02/01/2024 02:43 PM    LABCAST 0-1 Hyaline 07/19/2017 11:15 AM    WBCUA 4 02/01/2024 02:43 PM    RBCUA 5 02/01/2024 02:43 PM    MUCUS 2+ 07/19/2017 11:15 AM    BACTERIA None Seen 02/01/2024 02:43 PM    CLARITYU Clear 02/01/2024 02:43 PM    
rate. Regular rhythm. No murmur or rub. No edema.   GI: Non-tender. Non-distended. No hernia.   Skin: Warm, dry, normal texture and turgor.   Lymph: No cervical LAD. No supraclavicular LAD.   M/S: / Ext. No cyanosis. No clubbing. No joint deformity.    Neuro: CN 2-12 are intact,  no neurologic deficits noted.    PT/INR: No results for input(s): \"PROTIME\", \"INR\" in the last 72 hours.  APTT: No results for input(s): \"APTT\" in the last 72 hours.    CBC:   Recent Labs     02/01/24  1416 02/02/24  0600 02/03/24  0611 02/04/24  0600   WBC 5.6 3.5* 2.9* 3.1*   HGB 10.6* 9.5* 8.4* 8.4*   HCT 33.4* 29.5* 26.7* 26.2*   MCV 69.3* 68.7* 68.9* 68.8*   * 111* 104* 94*       BMP:   Recent Labs     02/01/24  1416 02/02/24  0600 02/03/24  0611 02/04/24  0600    138 137 138   K 5.1 5.8* 4.8 4.5    111* 111* 111*   CO2 16* 19* 17* 19*   PHOS  --  3.2 2.7 2.6   BUN 48* 42* 36* 31*   CREATININE 5.1* 3.6* 2.7* 2.4*       LIVER PROFILE:   Recent Labs     02/02/24  0600   ALKPHOS 38*   AST 7*   ALT <5*   BILITOT 0.3     No results for input(s): \"AMYLASE\" in the last 72 hours.  No results for input(s): \"LIPASE\" in the last 72 hours.    UA:  Recent Labs     02/01/24  1443   WBCUA 4   RBCUA 5*       TROPONIN: No results for input(s): \"CKTOTAL\", \"TROPONINI\" in the last 72 hours.    Lab Results   Component Value Date/Time    URRFLXCULT Not Indicated 02/01/2024 02:43 PM       No results for input(s): \"TSHREFLEX\" in the last 72 hours.    No components found for: \"KBV0180\"  POC GLUCOSE:    Recent Labs     02/03/24  1213 02/03/24  1537 02/03/24  1719 02/03/24  1933 02/04/24  0813   POCGLU 163* 183* 168* 188* 134*     Recent Labs     02/01/24  1416   LABA1C 7.4      Lab Results   Component Value Date/Time    LABA1C 7.4 02/01/2024 02:16 PM         ASSESSMENT AND PLAN    Acute kidney injury,   creatinine  baseline near 1.9 or 2.0  Cr  5.1- 3.6- 2.7- 2.4   Dc IV fluids  Nephrology consult is appreciated       Obstructive uropathy, 
1.011 02/01/2024 02:43 PM    LEUKOCYTESUR TRACE 02/01/2024 02:43 PM    UROBILINOGEN 0.2 02/01/2024 02:43 PM    BILIRUBINUR Negative 02/01/2024 02:43 PM    BLOODU MODERATE 02/01/2024 02:43 PM    GLUCOSEU Negative 02/01/2024 02:43 PM         IMPRESSION/RECOMMENDATIONS:      ANITA on CKD 4: Cr baseline ~ 2.0 mg/dL. Follows with Dr. St in office. 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 ANITA etiology 2/2 urinary retention. Known proteinuria.   - Cr 5.1>3.6 mg/dL. Trending down s/p felipe insertion and IVF.   - No urinary studies warranted at this time since Cr is trending down.    - Continue to hold lisinopril, furosemide, and farxiga   - Avoid hypotension. No NSAIDs. Avoid nephrotoxic agents when possible.   - Continue IVF   - Strict I/Os   - Daily RFTs    Hyperkalemia -- has a hx of intermittently high K+   - K+ 5.8 mg/dL   - Low K+ diet   - Lokelma x1 today    Bladder Outlet Obstruction   - Felipe catheter in place   - Urology consulted    CHF   - TTE (5/3/2021): LVEF 40 %. Grade 1 diastolic dysfx.   - Closely monitor volume status    HTN   - Controlled   - On metoprolol    CKD-BMD   - PO4 ordered    Anemia    - Hgb 9.5 mg/dL   - Iron studies ordered    Will discuss with nephrology attending physician, Dr. Grey.  See attestation for additional recommendations.    JACKI Chong - CNP

## 2024-02-05 NOTE — ACP (ADVANCE CARE PLANNING)
Advance Care Planning     Advance Care Planning Activator (Inpatient)  Conversation Note      Date of ACP Conversation: 2/5/2024     Conversation Conducted with: Patient with Decision Making Capacity    ACP Activator: JORDY Cano    Health Care Decision Maker:     Current Designated Health Care Decision Maker:     Primary Decision Maker: AgapitoOzzie - [facility] - [phone]  Today we discussed Healthcare Decision Makers. The patient is considering options.    Care Preferences    Ventilation:  \"If you were in your present state of health and suddenly became very ill and were unable to breathe on your own, what would your preference be about the use of a ventilator (breathing machine) if it were available to you?\"      Would the patient desire the use of ventilator (breathing machine)?: n/a    \"If your health worsens and it becomes clear that your chance of recovery is unlikely, what would your preference be about the use of a ventilator (breathing machine) if it were available to you?\"     Would the patient desire the use of ventilator (breathing machine)?: n/a       Resuscitation  \"CPR works best to restart the heart when there is a sudden event, like a heart attack, in someone who is otherwise healthy. Unfortunately, CPR does not typically restart the heart for people who have serious health conditions or who are very sick.\"    \"In the event your heart stopped as a result of an underlying serious health condition, would you want attempts to be made to restart your heart (answer \"yes\" for attempt to resuscitate) or would you prefer a natural death (answer \"no\" for do not attempt to resuscitate)?\" n/a       [] Yes   [x] No   Educated Patient / Decision Maker regarding differences between Advance Directives and portable DNR orders.    Length of ACP Conversation in minutes:  3    Conversation Outcomes:  DECLINED ACP conversation    Follow-up plan:    [] Schedule follow-up conversation to continue

## 2024-02-05 NOTE — PLAN OF CARE
Problem: Skin/Tissue Integrity  Goal: Absence of new skin breakdown  Description: 1.  Monitor for areas of redness and/or skin breakdown  2.  Assess vascular access sites hourly  3.  Every 4-6 hours minimum:  Change oxygen saturation probe site  4.  Every 4-6 hours:  If on nasal continuous positive airway pressure, respiratory therapy assess nares and determine need for appliance change or resting period.  2/5/2024 1017 by Chanelle Tellez RN  Outcome: Progressing  2/5/2024 0257 by Clarissa Cassidy RN  Outcome: Progressing  2/5/2024 0256 by Clarissa Cassidy RN  Outcome: Progressing     Problem: ABCDS Injury Assessment  Goal: Absence of physical injury  2/5/2024 1017 by Chanelle Tellez RN  Outcome: Progressing  2/5/2024 0257 by Clarissa Cassidy RN  Outcome: Progressing  2/5/2024 0256 by Clarissa Cassidy RN  Outcome: Progressing     Problem: Metabolic/Fluid and Electrolytes - Adult  Goal: Electrolytes maintained within normal limits  2/5/2024 1017 by Chanelle Tellez RN  Outcome: Progressing  2/5/2024 0257 by Clarissa Cassidy RN  Outcome: Progressing  Goal: Hemodynamic stability and optimal renal function maintained  2/5/2024 1017 by Chanelle Tellez RN  Outcome: Progressing  2/5/2024 0257 by Clarissa Cassidy RN  Outcome: Progressing  Goal: Glucose maintained within prescribed range  2/5/2024 1017 by Chanelle Tellez RN  Outcome: Progressing  2/5/2024 0257 by Clarissa Cassidy RN  Outcome: Progressing     Problem: Genitourinary - Adult  Goal: Absence of urinary retention  2/5/2024 1017 by Chanelle Tellez RN  Outcome: Progressing  2/5/2024 0257 by Clarissa Cassidy RN  Outcome: Progressing  Goal: Urinary catheter remains patent  2/5/2024 1017 by Chanelle Tellez RN  Outcome: Progressing  2/5/2024 0257 by Clarissa Cassidy RN  Outcome: Progressing

## 2024-02-05 NOTE — PLAN OF CARE
Problem: Skin/Tissue Integrity  Goal: Absence of new skin breakdown  Description: 1.  Monitor for areas of redness and/or skin breakdown  2.  Assess vascular access sites hourly  3.  Every 4-6 hours minimum:  Change oxygen saturation probe site  4.  Every 4-6 hours:  If on nasal continuous positive airway pressure, respiratory therapy assess nares and determine need for appliance change or resting period.  2/5/2024 0257 by Clarissa Cassidy RN  Outcome: Progressing  2/5/2024 0256 by Clarissa Cassidy RN  Outcome: Progressing     Problem: ABCDS Injury Assessment  Goal: Absence of physical injury  2/5/2024 0257 by Clarissa Cassidy RN  Outcome: Progressing  2/5/2024 0256 by Clarissa Cassidy RN  Outcome: Progressing     Problem: Metabolic/Fluid and Electrolytes - Adult  Goal: Electrolytes maintained within normal limits  Outcome: Progressing  Goal: Hemodynamic stability and optimal renal function maintained  Outcome: Progressing  Goal: Glucose maintained within prescribed range  Outcome: Progressing     Problem: Genitourinary - Adult  Goal: Absence of urinary retention  Outcome: Progressing  Goal: Urinary catheter remains patent  Outcome: Progressing

## 2024-02-05 NOTE — CARE COORDINATION
housing: none   Potential Assistance needed at discharge: (P) N/A            Potential DME:    Patient expects to discharge to: (P) Apartment  Plan for transportation at discharge: (P) Family    Financial    Payor: [employer] MEDICARE / Plan: YRUI MEDIBLUE ESSENTIAL/PLUS / Product Type: *No Product type* /     Does insurance require precert for SNF: No    Potential assistance Purchasing Medications: (P) No  Meds-to-Beds request: Yes      [facility] [id number] - [facility] [address] - P [phone] - F [phone]  [address] [address]  Phone: [phone] Fax: [phone]    [facility] - [address] - P [phone] - F [phone]  [address] [address]  Phone: [phone] Fax: [phone]      Notes:    Factors facilitating achievement of predicted outcomes: Family support    Barriers to discharge: Medication managment    Additional Case Management Notes: Patient states he will return home alone with family support.   ACP addressed.     The Plan for Transition of Care is related to the following treatment goals of Prostate cancer (HCC) [C61]  ANITA (acute kidney injury) (HCC) [N17.9]  Acute urinary retention [R33.8]  Acute kidney injury superimposed on CKD (HCC) [N17.9, N18.9]        JORDY Cano  Case Management Department  Ph: [phone]  Electronically signed by JORDY Cano on 2/5/2024 at 4:10 PM

## 2024-02-05 NOTE — DISCHARGE INSTRUCTIONS
Learning About Indwelling Urinary Catheter Care to Prevent Infection  Overview     A urinary catheter is a flexible tube that's used to drain urine from your bladder when you can't urinate on your own. The catheter allows urine to drain from the bladder into a bag.  Two main types of drainage bags may be used with a urinary catheter.  A bedside (or overnight) bag is a large bag that you can hang on the side of your bed or on a chair. You can use it overnight or anytime you will be sitting or lying down for a long time.  A leg bag is a small bag that you can use during the day. It is usually attached to your thigh or calf and hidden under your clothes.  Having a urinary catheter increases your risk of getting a urinary tract infection. Germs may get on the catheter and cause an infection in your bladder or kidneys. The longer you have a catheter, the more likely it is that you will get an infection. You can help prevent this problem with good hygiene and careful handling of your catheter and drainage bags.  How can you help prevent infection?  Take care to stay clean  Always wash your hands well before and after you handle your catheter.  Clean the skin around the catheter daily using soap and water. Dry with a clean towel afterward. You can shower with your catheter and drainage bag in place unless your doctor told you not to.  When you clean around the catheter, check the surrounding skin for signs of infection. Look for things like pus and irritated, swollen, red, or tender skin around the catheter.  Be careful with your drainage bag  Always keep the drainage bag below the level of your bladder. This will help keep urine from flowing back into your bladder.  Check often to see that urine is flowing through the catheter into the drainage bag.  Empty the drainage bag when it is half full. This will keep it from overflowing or backing up.  When you empty the drainage bag, do not let the tubing or drain spout

## 2024-02-07 NOTE — DISCHARGE SUMMARY
history significant for benign prostatic hypertrophy.  Patient was recently diagnosed with elevated PSA patient's biopsy was done which is consistent with prostate cancer.  Patient presented to the emergency room with urinary retention for which Bardales's catheter was placed.  Patient was also diagnosed with acute renal failure which is believed to be due to bladder outlet obstruction that was relieved with Bardales's catheter.  Patient's renal function improved.  Urology was consulted and they recommended to discharge patient with Bardales catheter and follow-up as an outpatient in urology office for voiding trial.                             Discharge Condition:  stable      Discharged to:  Home      Activity:   as tolerated:     Follow Up:  Follow-up with PCP in 1-2 weeks       Labs: For convenience and continuity at follow-up the following most recent labs are provided:      CBC:   Lab Results   Component Value Date/Time    WBC 3.1 02/05/2024 05:48 AM    HGB 8.6 02/05/2024 05:48 AM    HCT 27.1 02/05/2024 05:48 AM    PLT 98 02/05/2024 05:48 AM       RENAL:   Lab Results   Component Value Date/Time     02/05/2024 05:48 AM    K 4.5 02/05/2024 05:48 AM    K 5.8 02/02/2024 06:00 AM     02/05/2024 05:48 AM    CO2 18 02/05/2024 05:48 AM    BUN 27 02/05/2024 05:48 AM    CREATININE 2.3 02/05/2024 05:48 AM           Discharge Medications:      Medication List        CONTINUE taking these medications      atorvastatin 40 MG tablet  Commonly known as: LIPITOR     Breo Ellipta 200-25 MCG/ACT Aepb inhaler  Generic drug: fluticasone furoate-vilanterol     metoprolol succinate 100 MG extended release tablet  Commonly known as: TOPROL XL  TAKE ONE TABLET BY MOUTH DAILY     nitroGLYCERIN 0.4 MG SL tablet  Commonly known as: NITROSTAT  up to max of 3 total doses. If no relief after 1 dose, call 911.     Proventil  (90 Base) MCG/ACT inhaler  Generic drug: albuterol sulfate HFA     tamsulosin 0.4 MG capsule  Commonly

## 2024-03-01 ENCOUNTER — HOSPITAL ENCOUNTER (OUTPATIENT)
Dept: INTERVENTIONAL RADIOLOGY/VASCULAR | Age: 79
Discharge: HOME | End: 2024-03-01

## 2024-03-01 NOTE — CONSULTS
true   Transportation Needs: No Transportation Needs (2/1/2024)    PRAPARE - Transportation     Lack of Transportation (Medical): No     Lack of Transportation (Non-Medical): No   Physical Activity: Not on file   Stress: Not on file   Social Connections: Not on file   Intimate Partner Violence: Not on file   Housing Stability: Low Risk  (2/1/2024)    Housing Stability Vital Sign     Unable to Pay for Housing in the Last Year: No     Number of Places Lived in the Last Year: 1     Unstable Housing in the Last Year: No      No family history on file.  Allergies   Allergen Reactions    Nabumetone Itching    Oxycodone Rash     Pt has no problem with hydrocodone     Current Outpatient Medications on File Prior to Encounter   Medication Sig Dispense Refill    tamsulosin (FLOMAX) 0.4 MG capsule Take 1 capsule by mouth nightly      Dulaglutide (TRULICITY) 0.75 MG/0.5ML SOPN Inject 0.75 mg into the skin every 7 days      atorvastatin (LIPITOR) 40 MG tablet Take 1 tablet by mouth daily Take 40 mg by mouth daily      albuterol sulfate HFA (PROVENTIL HFA) 108 (90 Base) MCG/ACT inhaler Inhale 2 puffs into the lungs every 6 hours as needed for Wheezing 18 g 3    nitroGLYCERIN (NITROSTAT) 0.4 MG SL tablet up to max of 3 total doses. If no relief after 1 dose, call 911. 25 tablet 3    metoprolol succinate (TOPROL XL) 100 MG extended release tablet TAKE ONE TABLET BY MOUTH DAILY 60 tablet 2    Fluticasone furoate-vilanterol (BREO ELLIPTA) 200-25 MCG/INH AEPB inhaler Inhale 1 puff into the lungs daily       No current facility-administered medications on file prior to encounter.         IPSS Questionnaire     Over the past month…    Incomplete Emptying- How often have you had a sensation of not emptying your bladder completely after you finish urinating?  2 (less than half the time)   Frequency- How often have you had to urinate again less than two hours after you finished urinating? 1 (less than one time in five)    Intermittency-How

## 2024-03-04 DIAGNOSIS — R33.9 URINARY RETENTION: Primary | ICD-10-CM

## 2024-04-05 ENCOUNTER — HOSPITAL ENCOUNTER (OUTPATIENT)
Dept: MRI IMAGING | Age: 79
Discharge: HOME | End: 2024-04-05
Payer: MEDICARE

## 2024-04-05 ENCOUNTER — HOSPITAL ENCOUNTER (OUTPATIENT)
Dept: CT IMAGING | Age: 79
Discharge: HOME | End: 2024-04-05
Payer: MEDICARE

## 2024-04-05 DIAGNOSIS — R33.9 URINARY RETENTION: ICD-10-CM

## 2024-04-05 LAB
PERFORMED ON: ABNORMAL
POC CREATININE: 2.3 MG/DL (ref 0.8–1.3)
POC SAMPLE TYPE: ABNORMAL

## 2024-04-05 PROCEDURE — A9576 INJ PROHANCE MULTIPACK: HCPCS

## 2024-04-05 PROCEDURE — 6360000004 HC RX CONTRAST MEDICATION

## 2024-04-05 PROCEDURE — 74174 CTA ABD&PLVS W/CONTRAST: CPT

## 2024-04-05 PROCEDURE — 72197 MRI PELVIS W/O & W/DYE: CPT

## 2024-04-05 PROCEDURE — 82565 ASSAY OF CREATININE: CPT

## 2024-04-05 RX ORDER — IODIXANOL 270 MG/ML
75 INJECTION, SOLUTION INTRAVASCULAR
Status: COMPLETED | OUTPATIENT
Start: 2024-04-05 | End: 2024-04-05

## 2024-04-05 RX ORDER — IODIXANOL 320 MG/ML
75 INJECTION, SOLUTION INTRAVASCULAR
Status: DISCONTINUED | OUTPATIENT
Start: 2024-04-05 | End: 2024-04-05

## 2024-04-05 RX ADMIN — IODIXANOL 75 ML: 270 INJECTION, SOLUTION INTRAVASCULAR at 11:26

## 2024-04-05 RX ADMIN — GADOTERIDOL 20 ML: 279.3 INJECTION, SOLUTION INTRAVENOUS at 12:20

## 2024-04-12 NOTE — PROGRESS NOTES
Called patient about procedure.Told to be here at 0630 for procedure at 0800. NPO after midnight, but can take morning medication with sips of water, patient stated they are not on blood thinners. To have a responsible adult be with patient take them home and stay with them afterwards, if they do not get admitted to hosptial. And if available bring current list of medications. No other questions or concerns.

## 2024-04-15 ENCOUNTER — HOSPITAL ENCOUNTER (OUTPATIENT)
Dept: INTERVENTIONAL RADIOLOGY/VASCULAR | Age: 79
Discharge: HOME | End: 2024-04-15
Payer: MEDICARE

## 2024-04-15 VITALS
SYSTOLIC BLOOD PRESSURE: 142 MMHG | HEART RATE: 78 BPM | HEIGHT: 70 IN | DIASTOLIC BLOOD PRESSURE: 70 MMHG | BODY MASS INDEX: 37.37 KG/M2 | OXYGEN SATURATION: 96 % | RESPIRATION RATE: 26 BRPM | WEIGHT: 261 LBS | TEMPERATURE: 97.7 F

## 2024-04-15 DIAGNOSIS — N40.1 ENLARGED PROSTATE WITH URINARY RETENTION: ICD-10-CM

## 2024-04-15 DIAGNOSIS — R33.8 ENLARGED PROSTATE WITH URINARY RETENTION: ICD-10-CM

## 2024-04-15 LAB
ALBUMIN SERPL-MCNC: 4 G/DL (ref 3.4–5)
ALBUMIN/GLOB SERPL: 1.2 {RATIO} (ref 1.1–2.2)
ALP SERPL-CCNC: 59 U/L (ref 40–129)
ALT SERPL-CCNC: <5 U/L (ref 10–40)
ANION GAP SERPL CALCULATED.3IONS-SCNC: 11 MMOL/L (ref 3–16)
AST SERPL-CCNC: 10 U/L (ref 15–37)
BILIRUB SERPL-MCNC: 0.4 MG/DL (ref 0–1)
BUN SERPL-MCNC: 26 MG/DL (ref 7–20)
CALCIUM SERPL-MCNC: 9.2 MG/DL (ref 8.3–10.6)
CHLORIDE SERPL-SCNC: 108 MMOL/L (ref 99–110)
CO2 SERPL-SCNC: 20 MMOL/L (ref 21–32)
CREAT SERPL-MCNC: 2.2 MG/DL (ref 0.8–1.3)
DEPRECATED RDW RBC AUTO: 20.8 % (ref 12.4–15.4)
GFR SERPLBLD CREATININE-BSD FMLA CKD-EPI: 30 ML/MIN/{1.73_M2}
GLUCOSE SERPL-MCNC: 137 MG/DL (ref 70–99)
HCT VFR BLD AUTO: 35.8 % (ref 40.5–52.5)
HGB BLD-MCNC: 11.4 G/DL (ref 13.5–17.5)
INR PPP: 1.26 (ref 0.85–1.15)
MCH RBC QN AUTO: 23.6 PG (ref 26–34)
MCHC RBC AUTO-ENTMCNC: 31.8 G/DL (ref 31–36)
MCV RBC AUTO: 74.2 FL (ref 80–100)
PLATELET # BLD AUTO: 100 K/UL (ref 135–450)
PMV BLD AUTO: 7.9 FL (ref 5–10.5)
POTASSIUM SERPL-SCNC: 5.5 MMOL/L (ref 3.5–5.1)
PROT SERPL-MCNC: 7.4 G/DL (ref 6.4–8.2)
PROTHROMBIN TIME: 16 SEC (ref 11.9–14.9)
RBC # BLD AUTO: 4.82 M/UL (ref 4.2–5.9)
SODIUM SERPL-SCNC: 139 MMOL/L (ref 136–145)
WBC # BLD AUTO: 5 K/UL (ref 4–11)

## 2024-04-15 PROCEDURE — C1889 IMPLANT/INSERT DEVICE, NOC: HCPCS

## 2024-04-15 PROCEDURE — C1887 CATHETER, GUIDING: HCPCS

## 2024-04-15 PROCEDURE — 99153 MOD SED SAME PHYS/QHP EA: CPT

## 2024-04-15 PROCEDURE — 36248 INS CATH ABD/L-EXT ART ADDL: CPT

## 2024-04-15 PROCEDURE — 75736 ARTERY X-RAYS PELVIS: CPT

## 2024-04-15 PROCEDURE — 2500000003 HC RX 250 WO HCPCS

## 2024-04-15 PROCEDURE — 75774 ARTERY X-RAY EACH VESSEL: CPT

## 2024-04-15 PROCEDURE — 85027 COMPLETE CBC AUTOMATED: CPT

## 2024-04-15 PROCEDURE — 6370000000 HC RX 637 (ALT 250 FOR IP): Performed by: STUDENT IN AN ORGANIZED HEALTH CARE EDUCATION/TRAINING PROGRAM

## 2024-04-15 PROCEDURE — 80053 COMPREHEN METABOLIC PANEL: CPT

## 2024-04-15 PROCEDURE — 75710 ARTERY X-RAYS ARM/LEG: CPT

## 2024-04-15 PROCEDURE — C1894 INTRO/SHEATH, NON-LASER: HCPCS

## 2024-04-15 PROCEDURE — 36247 INS CATH ABD/L-EXT ART 3RD: CPT

## 2024-04-15 PROCEDURE — 85610 PROTHROMBIN TIME: CPT

## 2024-04-15 PROCEDURE — 37243 VASC EMBOLIZE/OCCLUDE ORGAN: CPT

## 2024-04-15 PROCEDURE — 6360000002 HC RX W HCPCS

## 2024-04-15 PROCEDURE — C1769 GUIDE WIRE: HCPCS

## 2024-04-15 PROCEDURE — 76377 3D RENDER W/INTRP POSTPROCES: CPT

## 2024-04-15 PROCEDURE — 99152 MOD SED SAME PHYS/QHP 5/>YRS: CPT

## 2024-04-15 RX ORDER — OMEPRAZOLE 20 MG/1
20 CAPSULE, DELAYED RELEASE ORAL DAILY PRN
Qty: 6 CAPSULE | Refills: 0 | Status: SHIPPED | OUTPATIENT
Start: 2024-04-15 | End: 2024-04-21

## 2024-04-15 RX ORDER — LIDOCAINE AND PRILOCAINE 25; 25 MG/G; MG/G
CREAM TOPICAL ONCE
Status: COMPLETED | OUTPATIENT
Start: 2024-04-15 | End: 2024-04-15

## 2024-04-15 RX ORDER — CIPROFLOXACIN 500 MG/1
500 TABLET, FILM COATED ORAL 2 TIMES DAILY
Qty: 12 TABLET | Refills: 0 | Status: SHIPPED | OUTPATIENT
Start: 2024-04-15 | End: 2024-04-21

## 2024-04-15 RX ADMIN — LIDOCAINE AND PRILOCAINE: 25; 25 CREAM TOPICAL at 08:01

## 2024-04-15 RX ADMIN — NITROGLYCERIN 0.5 INCH: 20 OINTMENT TOPICAL at 08:02

## 2024-04-15 NOTE — PROGRESS NOTES
Cath Lab Pre Procedure Flowsheet    Plan of Care:     Hemodynamics and cardiac rhythm will remain stable.   Comfort level will be maintained.   Respiratory function will remain adequate.   Pt/family will verbalize understanding of the procedure.   Procedure will be tolerated without complications.   Patient will recover from procedure without complications.   ID armband on patient and identification verified.   Informed consent obtained.   Non invasive blood pressure cuff applied, monitoring initiated.   EKG pads and pulse oximeter applied, monitoring initiated.   Instructions given. Patient and / or family verbalize understanding.   H&P will be documented by physician in [facility].     Pre-procedure:    NPO Status: Pt has been NPO since midnight. .    Contrast / IV Dye Allergy:     Pregnancy Test: N/A.    Prep Sites: Wrist(s)  Groin(s)    Shane's Test:    Pulses: susannah dp and pt 1+ susannah fem and wrist 2+    Anticoagulants: None.     Antiplatelets: None.     Chief Complaint:      Diabetic: yes trulicity treatment  Pre EKG Rhythm: nsr    Pre SBP: 135/72    IV access: right wrist    Pre-procedure blood work collected by: dee    NIH Scale:

## 2024-04-15 NOTE — H&P
Interventional Radiology Post Procedure    Date: 4/15/2024    Physician: Rafael Blount MD    Pre-op Diagnosis: BPH requiring indwelling catheter    Post-op Diagnosis: same    Variation from Planned Procedure: None       Findings: successful bilateral prostate artery embolization    Patient condition: Stable    Estimated Blood Loss: 3 cc    Specimens:  none    Plan:  - 2 hour recovery for radial band removal  - leave Bardales catheter in place for now  - voiding trial in 1 week with Urology  - follow up call in 3 months or sooner if questions/concerns     Signed,  Dutch Blount MD  10:46 AM  4/15/2024

## 2024-04-15 NOTE — FLOWSHEET NOTE
0467- discharge     Discharge instructions reviewed with patient and wife , patient ambulated and iv d/c'd , patient discharged via wheelchair out the main entrance

## 2024-04-15 NOTE — DISCHARGE INSTRUCTIONS
[facility]  Cardiovascular Special Procedures   Prostate artery embolization  Patient Discharge Instructions      Day 1 (Procedure Day):  Rest for the remainder of the day.  Avoid excessive use of the affected arm.  Do not drive a car.  Do not be alone.  Leave dressing intact.    Day 2:  You may drive a car, unless otherwise directed by physician.  Remove dressing.  You may bathe/shower, but wash gently around the puncture site and pat dry.  Place new band-aid on site daily until skin heals.      Things to Avoid:  You may not do any strenuous exercises for one week. (ex: golfing, bowling, tennis, vacuum, snow removal, jogging, and aerobic exercises).  No hot tubs, baths, or pools for 3-5 days.  Do not lift anything over 3 pounds for 3 days, with affected arm.  No lotions, powders, or ointments near site for 5 days.    Things to watch for:  You may have a small lump or a bruise.  This is common and will go away.  If bleeding occurs from the site, or a hematoma (lump) begins to increase in size, immediately apply pressure directly over the site and call 911 to return to the hospital.  Report any coolness or numbness in the arm or hand immediately.  Report any excessive pain of the arm or hand immediately.  If mild discomfort occurs at the puncture site you may place an ice pack on the site at 15 minute intervals..   Watch for signs and symptoms of an infection at the arm site (fever, local pain, redness, warmth or discharge from the puncture site), call your physician immediately if any develop.    Other:  No work/school for 72 hours if you received a stent; otherwise you may go back to work the following day (as long as your job does not interfere with the lifting restrictions).      Any Questions please call us at [phone] (between 7am- 5pm, Mon-Fri).  After hours you should contact your physician.          [facility]  Cardiovascular Special Procedures  General Discharge Instructions    PROCEDURE:

## 2024-04-16 ENCOUNTER — TELEPHONE (OUTPATIENT)
Dept: GENERAL RADIOLOGY | Age: 79
End: 2024-04-16

## 2024-04-16 NOTE — TELEPHONE ENCOUNTER
Called patient for follow up post PAE on 4/15. Patient states he is doing \"very well\" overall, with no complaints of abdominal pain/cramping. Patient tolerating PO diet without N/V. Patient was unable to get medications filled yesterday, but NP and patient called [facility] pharmacy and patient confirmed that he will  and start cipro today. Patient remains with indwelling felipe catheter, scheduled for voiding trial on 4/24 with Dr Cerda. Patient with no complaints of bleeding or pain at L wrist access site. Will follow up with patient later this week to confirm he is still doing okay post procedure.

## 2024-04-19 ENCOUNTER — TELEPHONE (OUTPATIENT)
Dept: GENERAL RADIOLOGY | Age: 79
End: 2024-04-19

## 2024-04-24 ENCOUNTER — HOSPITAL ENCOUNTER (EMERGENCY)
Age: 79
Discharge: HOME | End: 2024-04-24
Attending: EMERGENCY MEDICINE
Payer: MEDICARE

## 2024-04-24 VITALS
TEMPERATURE: 97.9 F | DIASTOLIC BLOOD PRESSURE: 83 MMHG | BODY MASS INDEX: 37.26 KG/M2 | RESPIRATION RATE: 16 BRPM | WEIGHT: 259.7 LBS | OXYGEN SATURATION: 96 % | HEART RATE: 88 BPM | SYSTOLIC BLOOD PRESSURE: 145 MMHG

## 2024-04-24 DIAGNOSIS — Z46.6 ENCOUNTER FOR FOLEY CATHETER REPLACEMENT: ICD-10-CM

## 2024-04-24 DIAGNOSIS — R33.9 URINARY RETENTION: Primary | ICD-10-CM

## 2024-04-24 LAB
ANION GAP SERPL CALCULATED.3IONS-SCNC: 10 MMOL/L (ref 3–16)
BACTERIA URNS QL MICRO: ABNORMAL /HPF
BILIRUB UR QL STRIP.AUTO: NEGATIVE
BUN SERPL-MCNC: 21 MG/DL (ref 7–20)
CALCIUM SERPL-MCNC: 8.7 MG/DL (ref 8.3–10.6)
CHLORIDE SERPL-SCNC: 105 MMOL/L (ref 99–110)
CLARITY UR: ABNORMAL
CO2 SERPL-SCNC: 20 MMOL/L (ref 21–32)
COLOR UR: YELLOW
CREAT SERPL-MCNC: 2 MG/DL (ref 0.8–1.3)
EPI CELLS #/AREA URNS AUTO: 0 /HPF (ref 0–5)
GFR SERPLBLD CREATININE-BSD FMLA CKD-EPI: 34 ML/MIN/{1.73_M2}
GLUCOSE SERPL-MCNC: 144 MG/DL (ref 70–99)
GLUCOSE UR STRIP.AUTO-MCNC: 100 MG/DL
HGB UR QL STRIP.AUTO: ABNORMAL
HYALINE CASTS #/AREA URNS AUTO: 2 /LPF (ref 0–8)
KETONES UR STRIP.AUTO-MCNC: NEGATIVE MG/DL
LEUKOCYTE ESTERASE UR QL STRIP.AUTO: NEGATIVE
NITRITE UR QL STRIP.AUTO: NEGATIVE
PH UR STRIP.AUTO: 6 [PH] (ref 5–8)
POTASSIUM SERPL-SCNC: 5.5 MMOL/L (ref 3.5–5.1)
PROT UR STRIP.AUTO-MCNC: 100 MG/DL
RBC CLUMPS #/AREA URNS AUTO: 21 /HPF (ref 0–4)
SODIUM SERPL-SCNC: 135 MMOL/L (ref 136–145)
SP GR UR STRIP.AUTO: 1.01 (ref 1–1.03)
UA COMPLETE W REFLEX CULTURE PNL UR: ABNORMAL
UA DIPSTICK W REFLEX MICRO PNL UR: YES
URN SPEC COLLECT METH UR: ABNORMAL
UROBILINOGEN UR STRIP-ACNC: 0.2 E.U./DL
WBC #/AREA URNS AUTO: 2 /HPF (ref 0–5)

## 2024-04-24 PROCEDURE — 80048 BASIC METABOLIC PNL TOTAL CA: CPT

## 2024-04-24 PROCEDURE — 6370000000 HC RX 637 (ALT 250 FOR IP): Performed by: EMERGENCY MEDICINE

## 2024-04-24 PROCEDURE — 51798 US URINE CAPACITY MEASURE: CPT

## 2024-04-24 PROCEDURE — 51702 INSERT TEMP BLADDER CATH: CPT

## 2024-04-24 PROCEDURE — 81001 URINALYSIS AUTO W/SCOPE: CPT

## 2024-04-24 PROCEDURE — 99283 EMERGENCY DEPT VISIT LOW MDM: CPT

## 2024-04-24 RX ORDER — LIDOCAINE HYDROCHLORIDE 20 MG/ML
JELLY TOPICAL PRN
Status: DISCONTINUED | OUTPATIENT
Start: 2024-04-24 | End: 2024-04-24 | Stop reason: HOSPADM

## 2024-04-24 RX ADMIN — LIDOCAINE HYDROCHLORIDE: 20 JELLY TOPICAL at 19:02

## 2024-04-24 ASSESSMENT — PAIN - FUNCTIONAL ASSESSMENT: PAIN_FUNCTIONAL_ASSESSMENT: NONE - DENIES PAIN

## 2024-04-24 NOTE — ED PROVIDER NOTES
EMERGENCY DEPARTMENT ENCOUNTER     [facility] EMERGENCY DEPARTMENT     Pt Name: Carter Altman   MRN: [id number]   Birthdate 1945   Date of evaluation: 4/24/2024   Provider: Esteban Jameson MD   PCP: Betsy Mauricio PA   Note Started: 6:45 PM EDT 4/24/24     CHIEF COMPLAINT     Chief Complaint   Patient presents with    Urinary Retention     Recent procedure for prostate.  Pt had catheter removal earlier today.  Pt states he is unable to urinate        HISTORY OF PRESENT ILLNESS:  History from : Patient   Limitations to history : None     Carter Altman is a 78 y.o. male who presents to emergency room with complaints of urinary retention.  Patient states that he has a history of prostate cancer and follows with Dr. Cerda as his urologist.  States he was seen by Dr. Cerda in the office today and had his Bardales catheter removed.  Patient states that since having the Bardales catheter removed he still been unable to urinate.  States he does have the urge to urinate and has some lower abdominal cramping in the midline.  Patient denies any back pain.  Denies any fevers or chills.  Denies any nausea or vomiting.  No testicular pain or swelling.  Denies any hematuria.  Normal bowel movements.  States he was instructed to return to the emergency room for any urinary retention after the catheter was removed today.    Nursing Notes were all reviewed and agreed with or any disagreements were addressed in the HPI.     ROS: Positives and Pertinent negatives as per HPI.    PAST MEDICAL HISTORY     Past medical history:  has a past medical history of A-fib (HCC), Anemia, Arthritis, Cancer (HCC) (2005), CHF (congestive heart failure) (HCC), Colon polyps (08/2010), Diabetes mellitus (HCC), Hepatitis C, Hypertension, Obesity, and Splenomegaly (2010).    Past surgical history:  has a past surgical history that includes bone marrow biopsy; Colonoscopy; liver biopsy; and Total knee arthroplasty (Left,

## 2024-04-24 NOTE — DISCHARGE INSTRUCTIONS
Call today or tomorrow to follow up with Betsy Mauricio PA  in 3-4 days.    Return to the Emergency Department for worsening of pain, inability to urinate, blood in your urine, excessive nausea or vomiting, fever > 101.5, any other care or concern.

## 2024-05-16 RX ORDER — NITROGLYCERIN 0.4 MG/1
TABLET SUBLINGUAL
Qty: 25 TABLET | Refills: 3 | Status: SHIPPED | OUTPATIENT
Start: 2024-05-16

## 2024-07-15 ENCOUNTER — TELEPHONE (OUTPATIENT)
Dept: GENERAL RADIOLOGY | Age: 79
End: 2024-07-15

## 2024-07-15 NOTE — TELEPHONE ENCOUNTER
07/15/24     Procedure Date: 4/15    IPSS Questionnaire     Over the past month…    Incomplete Emptying- How often have you had a sensation of not emptying your bladder completely after you finish urinating?  0 (not at all)   Frequency- How often have you had to urinate again less than two hours after you finished urinating? 1 (less than one time in five)    Intermittency-How often have you found you stopped and started again several times when you urinated?  0 (not at all)       Urgency- How difficult have you found it to postpone urination?  3 (about half the time)       Weak Stream- How often have you had a weak urinary stream?  2 (less than half the time)       Straining- How often have you had to push or strain to begin urination?  0 (not at all)       Sleeping- How many times did you most typically get up to urinate from the time you went to bed until the time you got up in the morning?  3 (about half the time)       Total International Prostate Symptom Score:9 moderate symptoms (8-19)       Quality of Life  If you were to spend the rest of your life with your urinary condition just the way it is now, how would you feel about that?     2 (mostly satisfied)

## 2024-11-22 NOTE — PROGRESS NOTES
Non-anxious  Psychiatric: Normal mood and affect  Skin: Warm and dry,  No rash seen          Lab Results   Component Value Date    WBC 5.0 04/15/2024    HGB 11.4 (L) 04/15/2024    HCT 35.8 (L) 04/15/2024     (L) 04/15/2024    CHOL 62 2021    TRIG 44 2021    HDL 18 (L) 2021    ALT <5 (L) 04/15/2024    AST 10 (L) 04/15/2024     (L) 2024    K 5.5 (H) 2024     2024    CREATININE 2.0 (H) 2024    BUN 21 (H) 2024    CO2 20 (L) 2024    TSH 2.42 2021    PSA 3.50 2010    INR 1.26 (H) 04/15/2024    LABA1C 7.4 2024              [id number]  Sinus tachycardia with PVCs and nonspecific T wave inversions    ECHO: 21  Normal LV size with LVEF of 40%. Global dysfunction. Grade I diastolic dysfunction with elevated L H filling pressures.  Mild mitral regurgitation is present.  The left atrium is moderately dilated.  Trivial aortic regurgitation is present.  The right ventricle is dilated. Right ventricular systolic function is mildly reduced.  Mild tricuspid regurgitation.      Coronary angiogram  & Intervention: 2018  1.  There is branch vessel disease in this codominant circulation.  The arteries are large.  2.  The left main is free of disease.  3.  LAD large vessel, D1: 80-90% ostial lesion. D2: subtotal and is the culprit vessel.  4.  The left circumflex artery is free of disease.  It is a codominant vessel.  5.  The codominant right also has no major obstructive disease.     Successful PCI and stenting of subtotally occluded diagonal branch.  Lesion reduced from 100% to 0%.  2.5 x 23-mm JOHNSON stent was used.        ASSESSMENT AND PLAN:      CAD / STEMI 18  High lateral infarct secondary to occlusion of the D2 branch.  This was ballooned and stented using 2.5×23 mm JOHNSON stent.  D1 also has 80% ostial lesion. Other major vessels are free of disease; EF 35%.  This is improved to 40% now    Hyperlipidemia  LDL 90, 
ADMIT

## 2024-11-25 ENCOUNTER — OFFICE VISIT (OUTPATIENT)
Dept: CARDIOLOGY CLINIC | Age: 79
End: 2024-11-25
Payer: MEDICARE

## 2024-11-25 VITALS
HEIGHT: 70 IN | HEART RATE: 108 BPM | RESPIRATION RATE: 18 BRPM | DIASTOLIC BLOOD PRESSURE: 80 MMHG | BODY MASS INDEX: 37.22 KG/M2 | OXYGEN SATURATION: 96 % | SYSTOLIC BLOOD PRESSURE: 138 MMHG | WEIGHT: 260 LBS

## 2024-11-25 DIAGNOSIS — R00.0 TACHYCARDIA: Primary | ICD-10-CM

## 2024-11-25 PROCEDURE — 1123F ACP DISCUSS/DSCN MKR DOCD: CPT | Performed by: INTERNAL MEDICINE

## 2024-11-25 PROCEDURE — 3075F SYST BP GE 130 - 139MM HG: CPT | Performed by: INTERNAL MEDICINE

## 2024-11-25 PROCEDURE — 3079F DIAST BP 80-89 MM HG: CPT | Performed by: INTERNAL MEDICINE

## 2024-11-25 PROCEDURE — 1159F MED LIST DOCD IN RCRD: CPT | Performed by: INTERNAL MEDICINE

## 2024-11-25 PROCEDURE — 93000 ELECTROCARDIOGRAM COMPLETE: CPT | Performed by: INTERNAL MEDICINE

## 2024-11-25 PROCEDURE — 99214 OFFICE O/P EST MOD 30 MIN: CPT | Performed by: INTERNAL MEDICINE

## 2024-11-25 RX ORDER — ACETAMINOPHEN 325 MG/1
2 TABLET ORAL
COMMUNITY

## 2024-11-25 RX ORDER — CALCITRIOL 0.25 UG/1
0.25 CAPSULE, LIQUID FILLED ORAL
COMMUNITY
Start: 2024-04-25 | End: 2024-11-25

## 2024-11-25 RX ORDER — LISINOPRIL 10 MG/1
5 TABLET ORAL DAILY
COMMUNITY
Start: 2024-02-29 | End: 2024-11-25

## 2024-11-25 RX ORDER — DAPAGLIFLOZIN 5 MG/1
5 TABLET, FILM COATED ORAL DAILY
COMMUNITY
Start: 2024-04-25

## 2025-06-06 NOTE — PROGRESS NOTES
d/t renal failure  -SGLT2i: On Farxiga 5 mg daily     DM type II  A1C 7.4 (2/1/24)  On Trulicity  Managed by PCP    CKD IV  Creatinine 2.33 (9/3/24)  Managed by nephrology            Return to office for follow up in 1 year        Thank you very much for allowing me to participate in the care of your patient. Please do not hesitate to contact me if you have any questions.      Sincerely,        JESSE Huerta M.D  [facility]  [address], [address]   [place] [address]  Ph: [phone]  Fax: [phone]    This note was scribed in the presence of Dr. JESSE Huerta MD by Radhika Sandoval RN     Physician Attestation:  The scribes documentation has been prepared under my direction and personally reviewed by me in its entirety. I confirm that the note above accurately reflects all work, treatment, procedures, and medical decision making performed by me.    06/10/25

## 2025-06-10 ENCOUNTER — OFFICE VISIT (OUTPATIENT)
Dept: CARDIOLOGY CLINIC | Age: 80
End: 2025-06-10
Payer: MEDICARE

## 2025-06-10 VITALS
HEIGHT: 70 IN | OXYGEN SATURATION: 94 % | WEIGHT: 253 LBS | BODY MASS INDEX: 36.22 KG/M2 | SYSTOLIC BLOOD PRESSURE: 132 MMHG | DIASTOLIC BLOOD PRESSURE: 78 MMHG | HEART RATE: 100 BPM

## 2025-06-10 DIAGNOSIS — E11.69 TYPE 2 DIABETES MELLITUS WITH OBESITY (HCC): ICD-10-CM

## 2025-06-10 DIAGNOSIS — E66.9 TYPE 2 DIABETES MELLITUS WITH OBESITY (HCC): ICD-10-CM

## 2025-06-10 DIAGNOSIS — E78.2 MIXED HYPERLIPIDEMIA: ICD-10-CM

## 2025-06-10 DIAGNOSIS — Z95.5 CORONARY ARTERY DISEASE STATUS POST CORONARY STENT INSERTION: Primary | ICD-10-CM

## 2025-06-10 DIAGNOSIS — I25.5 ISCHEMIC CARDIOMYOPATHY: ICD-10-CM

## 2025-06-10 DIAGNOSIS — N18.4 CKD (CHRONIC KIDNEY DISEASE), STAGE IV (HCC): ICD-10-CM

## 2025-06-10 DIAGNOSIS — I25.10 CORONARY ARTERY DISEASE STATUS POST CORONARY STENT INSERTION: Primary | ICD-10-CM

## 2025-06-10 PROCEDURE — 3078F DIAST BP <80 MM HG: CPT | Performed by: INTERNAL MEDICINE

## 2025-06-10 PROCEDURE — 99214 OFFICE O/P EST MOD 30 MIN: CPT | Performed by: INTERNAL MEDICINE

## 2025-06-10 PROCEDURE — 3075F SYST BP GE 130 - 139MM HG: CPT | Performed by: INTERNAL MEDICINE

## 2025-06-10 PROCEDURE — 93000 ELECTROCARDIOGRAM COMPLETE: CPT | Performed by: INTERNAL MEDICINE

## 2025-06-10 PROCEDURE — G2211 COMPLEX E/M VISIT ADD ON: HCPCS | Performed by: INTERNAL MEDICINE

## 2025-06-10 PROCEDURE — 1159F MED LIST DOCD IN RCRD: CPT | Performed by: INTERNAL MEDICINE

## 2025-06-10 PROCEDURE — 1123F ACP DISCUSS/DSCN MKR DOCD: CPT | Performed by: INTERNAL MEDICINE

## 2025-06-10 RX ORDER — BLOOD-GLUCOSE METER
EACH MISCELLANEOUS
COMMUNITY
Start: 2025-05-01

## 2025-06-10 RX ORDER — METOPROLOL SUCCINATE 100 MG/1
TABLET, EXTENDED RELEASE ORAL
Qty: 60 TABLET | Refills: 2 | Status: SHIPPED | OUTPATIENT
Start: 2025-06-10

## 2025-06-10 RX ORDER — TORSEMIDE 20 MG/1
20 TABLET ORAL DAILY
COMMUNITY
Start: 2025-05-01

## 2025-06-10 RX ORDER — SODIUM BICARBONATE 650 MG/1
650 TABLET ORAL DAILY
COMMUNITY
Start: 2025-04-10 | End: 2026-04-10

## 2025-06-10 RX ORDER — ASPIRIN 81 MG/1
81 TABLET ORAL DAILY
Qty: 30 TABLET | Refills: 0 | Status: SHIPPED | OUTPATIENT
Start: 2025-06-10

## 2025-06-10 RX ORDER — LISINOPRIL 5 MG/1
5 TABLET ORAL DAILY
COMMUNITY
Start: 2025-04-30